# Patient Record
Sex: MALE | Race: WHITE | Employment: OTHER | URBAN - METROPOLITAN AREA
[De-identification: names, ages, dates, MRNs, and addresses within clinical notes are randomized per-mention and may not be internally consistent; named-entity substitution may affect disease eponyms.]

---

## 2021-02-25 ENCOUNTER — TELEPHONE (OUTPATIENT)
Dept: CARDIOLOGY CLINIC | Age: 61
End: 2021-02-25

## 2021-02-25 DIAGNOSIS — I73.9 PAD (PERIPHERAL ARTERY DISEASE) (HCC): Primary | ICD-10-CM

## 2021-02-25 NOTE — TELEPHONE ENCOUNTER
Peripheral angiogram scheduled for 3/8/2021 at 8:00  Arrive at 6:30      The morning of your procedure you will park in the hospital parking lot and report directly to the cath lab to check in.     Pre-Procedure Instructions   1. You will need to fast for at least 8 hours prior to procedure. No caffeine the morning of.   2. Hold your diuretic, lasix the morning of procedure. 3. Hold all diabetic medications including, Metfomin. If you take Lantus/Levemir only take ½ your normal dose the evening before. All other medications can be taken in the morning with sips of water. 4. You will need to take 325 mg aspirin the morning of. If you are currently taking 81 mg please take 4 tablets that morning. 5. Do not use any lotions, creams or perfume the morning of procedure. 6. Pre-procedure lab work will need to be completed 5-7 days prior to procedure. 7. Please have a responsible adult to drive you home after procedure. We advise you have someone to stay with you for 24 hours following procedure for precautionary measures. Depending on procedure you may require an overnight stay. 8. Cath lab will provide you with all post procedure instructions. If you have any questions regarding the procedure itself or medications, please call [phone] and ask to speak with a nurse. Called and spoke to [facility] and she is agreeable to date/time. Went over instructions with Jocelin. Will fax lab orders to [facility].     Published on [employer] and e-mail to Sherry Wallace.

## 2021-03-01 ENCOUNTER — HOSPITAL ENCOUNTER (INPATIENT)
Age: 61
LOS: 10 days | Discharge: NURSING, REHAB OR LONG-TERM CARE | DRG: 474 | End: 2021-03-11
Attending: EMERGENCY MEDICINE
Payer: MEDICARE

## 2021-03-01 ENCOUNTER — APPOINTMENT (OUTPATIENT)
Dept: GENERAL RADIOLOGY | Age: 61
DRG: 474 | End: 2021-03-01
Payer: MEDICARE

## 2021-03-01 DIAGNOSIS — Z86.39 HX OF DIABETIC NEUROPATHY: ICD-10-CM

## 2021-03-01 DIAGNOSIS — T81.31XA DEHISCENCE OF OPERATIVE WOUND, INITIAL ENCOUNTER: Primary | ICD-10-CM

## 2021-03-01 DIAGNOSIS — Z89.432 STATUS POST TRANSMETATARSAL AMPUTATION OF FOOT, LEFT (HCC): ICD-10-CM

## 2021-03-01 DIAGNOSIS — I70.229 CRITICAL LOWER LIMB ISCHEMIA (HCC): ICD-10-CM

## 2021-03-01 PROBLEM — L08.9 WOUND INFECTION: Status: ACTIVE | Noted: 2021-03-01

## 2021-03-01 PROBLEM — T14.8XXA WOUND INFECTION: Status: ACTIVE | Noted: 2021-03-01

## 2021-03-01 LAB
ANION GAP SERPL CALCULATED.3IONS-SCNC: 8 MMOL/L (ref 3–16)
BASOPHILS ABSOLUTE: 0.1 K/UL (ref 0–0.2)
BASOPHILS RELATIVE PERCENT: 0.6 %
BUN BLDV-MCNC: 15 MG/DL (ref 7–20)
C-REACTIVE PROTEIN: 38.6 MG/L (ref 0–5.1)
CALCIUM SERPL-MCNC: 9.1 MG/DL (ref 8.3–10.6)
CHLORIDE BLD-SCNC: 96 MMOL/L (ref 99–110)
CO2: 30 MMOL/L (ref 21–32)
CREAT SERPL-MCNC: 1.1 MG/DL (ref 0.8–1.3)
EOSINOPHILS ABSOLUTE: 0.5 K/UL (ref 0–0.6)
EOSINOPHILS RELATIVE PERCENT: 5.8 %
GFR AFRICAN AMERICAN: >60
GFR NON-AFRICAN AMERICAN: >60
GLUCOSE BLD-MCNC: 236 MG/DL (ref 70–99)
GLUCOSE BLD-MCNC: 307 MG/DL (ref 70–99)
GLUCOSE BLD-MCNC: 358 MG/DL (ref 70–99)
HCT VFR BLD CALC: 38.2 % (ref 40.5–52.5)
HEMOGLOBIN: 12.5 G/DL (ref 13.5–17.5)
LACTIC ACID, SEPSIS: 1.6 MMOL/L (ref 0.4–1.9)
LACTIC ACID, SEPSIS: 1.8 MMOL/L (ref 0.4–1.9)
LYMPHOCYTES ABSOLUTE: 1.5 K/UL (ref 1–5.1)
LYMPHOCYTES RELATIVE PERCENT: 19.1 %
MCH RBC QN AUTO: 27.7 PG (ref 26–34)
MCHC RBC AUTO-ENTMCNC: 32.7 G/DL (ref 31–36)
MCV RBC AUTO: 84.8 FL (ref 80–100)
MONOCYTES ABSOLUTE: 0.7 K/UL (ref 0–1.3)
MONOCYTES RELATIVE PERCENT: 8.3 %
NEUTROPHILS ABSOLUTE: 5.2 K/UL (ref 1.7–7.7)
NEUTROPHILS RELATIVE PERCENT: 66.2 %
PDW BLD-RTO: 14.9 % (ref 12.4–15.4)
PERFORMED ON: ABNORMAL
PERFORMED ON: ABNORMAL
PLATELET # BLD: 261 K/UL (ref 135–450)
PMV BLD AUTO: 7.3 FL (ref 5–10.5)
POTASSIUM REFLEX MAGNESIUM: 4.4 MMOL/L (ref 3.5–5.1)
RBC # BLD: 4.5 M/UL (ref 4.2–5.9)
SEDIMENTATION RATE, ERYTHROCYTE: 99 MM/HR (ref 0–20)
SODIUM BLD-SCNC: 134 MMOL/L (ref 136–145)
WBC # BLD: 7.8 K/UL (ref 4–11)

## 2021-03-01 PROCEDURE — 83605 ASSAY OF LACTIC ACID: CPT

## 2021-03-01 PROCEDURE — 83036 HEMOGLOBIN GLYCOSYLATED A1C: CPT

## 2021-03-01 PROCEDURE — 84134 ASSAY OF PREALBUMIN: CPT

## 2021-03-01 PROCEDURE — 2580000003 HC RX 258: Performed by: INTERNAL MEDICINE

## 2021-03-01 PROCEDURE — 1200000000 HC SEMI PRIVATE

## 2021-03-01 PROCEDURE — 99222 1ST HOSP IP/OBS MODERATE 55: CPT | Performed by: INTERNAL MEDICINE

## 2021-03-01 PROCEDURE — 73620 X-RAY EXAM OF FOOT: CPT

## 2021-03-01 PROCEDURE — 99284 EMERGENCY DEPT VISIT MOD MDM: CPT

## 2021-03-01 PROCEDURE — 85652 RBC SED RATE AUTOMATED: CPT

## 2021-03-01 PROCEDURE — 85025 COMPLETE CBC W/AUTO DIFF WBC: CPT

## 2021-03-01 PROCEDURE — 6370000000 HC RX 637 (ALT 250 FOR IP): Performed by: STUDENT IN AN ORGANIZED HEALTH CARE EDUCATION/TRAINING PROGRAM

## 2021-03-01 PROCEDURE — 80048 BASIC METABOLIC PNL TOTAL CA: CPT

## 2021-03-01 PROCEDURE — 2060000000 HC ICU INTERMEDIATE R&B

## 2021-03-01 PROCEDURE — 86140 C-REACTIVE PROTEIN: CPT

## 2021-03-01 PROCEDURE — 6370000000 HC RX 637 (ALT 250 FOR IP): Performed by: INTERNAL MEDICINE

## 2021-03-01 RX ORDER — ONDANSETRON 2 MG/ML
4 INJECTION INTRAMUSCULAR; INTRAVENOUS EVERY 6 HOURS PRN
Status: DISCONTINUED | OUTPATIENT
Start: 2021-03-01 | End: 2021-03-11 | Stop reason: HOSPADM

## 2021-03-01 RX ORDER — PROMETHAZINE HYDROCHLORIDE 12.5 MG/1
12.5 TABLET ORAL EVERY 6 HOURS PRN
Status: DISCONTINUED | OUTPATIENT
Start: 2021-03-01 | End: 2021-03-11 | Stop reason: HOSPADM

## 2021-03-01 RX ORDER — INSULIN LISPRO 100 [IU]/ML
0-3 INJECTION, SOLUTION INTRAVENOUS; SUBCUTANEOUS NIGHTLY
Status: DISCONTINUED | OUTPATIENT
Start: 2021-03-01 | End: 2021-03-07

## 2021-03-01 RX ORDER — TAMSULOSIN HYDROCHLORIDE 0.4 MG/1
0.8 CAPSULE ORAL DAILY
Status: DISCONTINUED | OUTPATIENT
Start: 2021-03-02 | End: 2021-03-11 | Stop reason: HOSPADM

## 2021-03-01 RX ORDER — ALBUTEROL SULFATE 90 UG/1
2 AEROSOL, METERED RESPIRATORY (INHALATION) EVERY 6 HOURS PRN
COMMUNITY
End: 2021-03-01 | Stop reason: CLARIF

## 2021-03-01 RX ORDER — INSULIN LISPRO 100 [IU]/ML
40 INJECTION, SOLUTION INTRAVENOUS; SUBCUTANEOUS
Status: DISCONTINUED | OUTPATIENT
Start: 2021-03-01 | End: 2021-03-01

## 2021-03-01 RX ORDER — METOPROLOL SUCCINATE 25 MG/1
12.5 TABLET, EXTENDED RELEASE ORAL 2 TIMES DAILY
COMMUNITY

## 2021-03-01 RX ORDER — LOSARTAN POTASSIUM 25 MG/1
25 TABLET ORAL DAILY
COMMUNITY

## 2021-03-01 RX ORDER — MONTELUKAST SODIUM 4 MG/1
1 TABLET, CHEWABLE ORAL 2 TIMES DAILY
COMMUNITY

## 2021-03-01 RX ORDER — INSULIN LISPRO 100 [IU]/ML
40 INJECTION, SOLUTION INTRAVENOUS; SUBCUTANEOUS ONCE
Status: COMPLETED | OUTPATIENT
Start: 2021-03-01 | End: 2021-03-01

## 2021-03-01 RX ORDER — CILOSTAZOL 50 MG/1
100 TABLET ORAL 2 TIMES DAILY
Status: DISCONTINUED | OUTPATIENT
Start: 2021-03-01 | End: 2021-03-05

## 2021-03-01 RX ORDER — DULOXETIN HYDROCHLORIDE 30 MG/1
90 CAPSULE, DELAYED RELEASE ORAL DAILY
COMMUNITY

## 2021-03-01 RX ORDER — ASPIRIN 81 MG/1
81 TABLET ORAL DAILY
Status: DISCONTINUED | OUTPATIENT
Start: 2021-03-02 | End: 2021-03-11 | Stop reason: HOSPADM

## 2021-03-01 RX ORDER — NAPROXEN 500 MG/1
500 TABLET ORAL 2 TIMES DAILY WITH MEALS
Status: ON HOLD | COMMUNITY
End: 2021-03-08 | Stop reason: HOSPADM

## 2021-03-01 RX ORDER — PANTOPRAZOLE SODIUM 40 MG/1
40 TABLET, DELAYED RELEASE ORAL 2 TIMES DAILY
COMMUNITY

## 2021-03-01 RX ORDER — INSULIN LISPRO 100 [IU]/ML
0-6 INJECTION, SOLUTION INTRAVENOUS; SUBCUTANEOUS
Status: DISCONTINUED | OUTPATIENT
Start: 2021-03-02 | End: 2021-03-07

## 2021-03-01 RX ORDER — INSULIN GLARGINE 100 [IU]/ML
70 INJECTION, SOLUTION SUBCUTANEOUS 2 TIMES DAILY
COMMUNITY

## 2021-03-01 RX ORDER — SODIUM CHLORIDE 9 MG/ML
INJECTION, SOLUTION INTRAVENOUS CONTINUOUS
Status: DISCONTINUED | OUTPATIENT
Start: 2021-03-01 | End: 2021-03-02 | Stop reason: SDUPTHER

## 2021-03-01 RX ORDER — PRAMIPEXOLE DIHYDROCHLORIDE 0.25 MG/1
0.5 TABLET ORAL NIGHTLY
Status: DISCONTINUED | OUTPATIENT
Start: 2021-03-01 | End: 2021-03-11 | Stop reason: HOSPADM

## 2021-03-01 RX ORDER — SIMVASTATIN 40 MG
40 TABLET ORAL NIGHTLY
COMMUNITY

## 2021-03-01 RX ORDER — FUROSEMIDE 40 MG/1
40 TABLET ORAL 2 TIMES DAILY
COMMUNITY

## 2021-03-01 RX ORDER — SODIUM CHLORIDE 9 MG/ML
INJECTION, SOLUTION INTRAVENOUS CONTINUOUS
Status: DISCONTINUED | OUTPATIENT
Start: 2021-03-02 | End: 2021-03-01 | Stop reason: SDUPTHER

## 2021-03-01 RX ORDER — PRAMIPEXOLE DIHYDROCHLORIDE 0.5 MG/1
0.5 TABLET ORAL NIGHTLY
COMMUNITY

## 2021-03-01 RX ORDER — GABAPENTIN 300 MG/1
300 CAPSULE ORAL 3 TIMES DAILY
Status: DISCONTINUED | OUTPATIENT
Start: 2021-03-01 | End: 2021-03-11 | Stop reason: HOSPADM

## 2021-03-01 RX ORDER — POLYETHYLENE GLYCOL 3350 17 G/17G
17 POWDER, FOR SOLUTION ORAL DAILY PRN
Status: DISCONTINUED | OUTPATIENT
Start: 2021-03-01 | End: 2021-03-11 | Stop reason: HOSPADM

## 2021-03-01 RX ORDER — SODIUM CHLORIDE 0.9 % (FLUSH) 0.9 %
10 SYRINGE (ML) INJECTION PRN
Status: DISCONTINUED | OUTPATIENT
Start: 2021-03-01 | End: 2021-03-02 | Stop reason: SDUPTHER

## 2021-03-01 RX ORDER — GABAPENTIN 300 MG/1
300 CAPSULE ORAL 3 TIMES DAILY
COMMUNITY

## 2021-03-01 RX ORDER — PANTOPRAZOLE SODIUM 40 MG/1
40 TABLET, DELAYED RELEASE ORAL 2 TIMES DAILY
Status: DISCONTINUED | OUTPATIENT
Start: 2021-03-01 | End: 2021-03-11 | Stop reason: HOSPADM

## 2021-03-01 RX ORDER — SODIUM CHLORIDE 0.9 % (FLUSH) 0.9 %
10 SYRINGE (ML) INJECTION EVERY 12 HOURS SCHEDULED
Status: DISCONTINUED | OUTPATIENT
Start: 2021-03-01 | End: 2021-03-01 | Stop reason: SDUPTHER

## 2021-03-01 RX ORDER — ACETAMINOPHEN 650 MG/1
650 SUPPOSITORY RECTAL EVERY 6 HOURS PRN
Status: DISCONTINUED | OUTPATIENT
Start: 2021-03-01 | End: 2021-03-11 | Stop reason: HOSPADM

## 2021-03-01 RX ORDER — CHOLESTYRAMINE LIGHT 4 G/5.7G
4 POWDER, FOR SUSPENSION ORAL DAILY
Status: DISCONTINUED | OUTPATIENT
Start: 2021-03-02 | End: 2021-03-11 | Stop reason: HOSPADM

## 2021-03-01 RX ORDER — CILOSTAZOL 100 MG/1
100 TABLET ORAL 2 TIMES DAILY
COMMUNITY

## 2021-03-01 RX ORDER — LANOLIN ALCOHOL/MO/W.PET/CERES
3 CREAM (GRAM) TOPICAL DAILY
COMMUNITY

## 2021-03-01 RX ORDER — METOPROLOL SUCCINATE 25 MG/1
12.5 TABLET, EXTENDED RELEASE ORAL 2 TIMES DAILY
Status: DISCONTINUED | OUTPATIENT
Start: 2021-03-01 | End: 2021-03-11 | Stop reason: HOSPADM

## 2021-03-01 RX ORDER — ACETAMINOPHEN 325 MG/1
650 TABLET ORAL EVERY 6 HOURS PRN
Status: DISCONTINUED | OUTPATIENT
Start: 2021-03-01 | End: 2021-03-11 | Stop reason: HOSPADM

## 2021-03-01 RX ORDER — SODIUM CHLORIDE 0.9 % (FLUSH) 0.9 %
10 SYRINGE (ML) INJECTION EVERY 12 HOURS SCHEDULED
Status: DISCONTINUED | OUTPATIENT
Start: 2021-03-01 | End: 2021-03-11 | Stop reason: HOSPADM

## 2021-03-01 RX ORDER — TAMSULOSIN HYDROCHLORIDE 0.4 MG/1
0.8 CAPSULE ORAL DAILY
COMMUNITY

## 2021-03-01 RX ORDER — LOSARTAN POTASSIUM 25 MG/1
25 TABLET ORAL DAILY
Status: DISCONTINUED | OUTPATIENT
Start: 2021-03-02 | End: 2021-03-11 | Stop reason: HOSPADM

## 2021-03-01 RX ORDER — ASPIRIN 81 MG/1
81 TABLET ORAL DAILY
COMMUNITY

## 2021-03-01 RX ORDER — SODIUM CHLORIDE 0.9 % (FLUSH) 0.9 %
10 SYRINGE (ML) INJECTION PRN
Status: DISCONTINUED | OUTPATIENT
Start: 2021-03-01 | End: 2021-03-01 | Stop reason: SDUPTHER

## 2021-03-01 RX ORDER — POTASSIUM CHLORIDE 750 MG/1
10 CAPSULE, EXTENDED RELEASE ORAL DAILY
COMMUNITY

## 2021-03-01 RX ORDER — ATORVASTATIN CALCIUM 20 MG/1
20 TABLET, FILM COATED ORAL NIGHTLY
Status: DISCONTINUED | OUTPATIENT
Start: 2021-03-01 | End: 2021-03-11 | Stop reason: HOSPADM

## 2021-03-01 RX ORDER — INSULIN LISPRO 100 [IU]/ML
40 INJECTION, SOLUTION INTRAVENOUS; SUBCUTANEOUS
Status: DISCONTINUED | OUTPATIENT
Start: 2021-03-02 | End: 2021-03-06

## 2021-03-01 RX ADMIN — ATORVASTATIN CALCIUM 20 MG: 20 TABLET, FILM COATED ORAL at 21:34

## 2021-03-01 RX ADMIN — PRAMIPEXOLE DIHYDROCHLORIDE 0.5 MG: 0.25 TABLET ORAL at 21:34

## 2021-03-01 RX ADMIN — PANTOPRAZOLE SODIUM 40 MG: 40 TABLET, DELAYED RELEASE ORAL at 21:33

## 2021-03-01 RX ADMIN — INSULIN GLARGINE 70 UNITS: 100 INJECTION, SOLUTION SUBCUTANEOUS at 23:14

## 2021-03-01 RX ADMIN — INSULIN LISPRO 40 UNITS: 100 INJECTION, SOLUTION INTRAVENOUS; SUBCUTANEOUS at 20:56

## 2021-03-01 RX ADMIN — GABAPENTIN 300 MG: 300 CAPSULE ORAL at 21:34

## 2021-03-01 RX ADMIN — Medication 10 ML: at 21:33

## 2021-03-01 RX ADMIN — INSULIN LISPRO 1 UNITS: 100 INJECTION, SOLUTION INTRAVENOUS; SUBCUTANEOUS at 23:13

## 2021-03-01 RX ADMIN — METOPROLOL SUCCINATE 12.5 MG: 25 TABLET, EXTENDED RELEASE ORAL at 21:34

## 2021-03-01 RX ADMIN — SODIUM CHLORIDE: 9 INJECTION, SOLUTION INTRAVENOUS at 21:35

## 2021-03-01 RX ADMIN — CILOSTAZOL 100 MG: 50 TABLET ORAL at 21:34

## 2021-03-01 ASSESSMENT — ENCOUNTER SYMPTOMS
EYE PAIN: 0
ABDOMINAL DISTENTION: 0
SHORTNESS OF BREATH: 0
COUGH: 0
ABDOMINAL PAIN: 0
APNEA: 0

## 2021-03-01 NOTE — CONSULTS
Department of Podiatry Consult Note  Resident       Reason for Consult:  Recent amputation of toes, concern for wound dehiscence  Requesting Physician:  Damaris Conway MD    CHIEF COMPLAINT:  Wound dehiscence    HISTORY OF PRESENT ILLNESS:                The patient is a 63 y.o. male with significant past medical history of type 1 diabetes mellitus, history of below-knee amputation of right lower extremity, transmetatarsal amputation of left lower extremity, peripheral arterial disease. Podiatry was consulted for concern of wound dehiscence of left transmetatarsal amputation. Approximately 5 weeks ago he underwent transmetatarsal amputation by Dr. Jesse Zimmer at [facility]. Sutures were removed approximately 3 weeks ago, and since the wound has dehisced. Patient recently started seeing Dr. Phu Beltran who had concerns of left lower extremity chronic limb ischemia. Patient was originally scheduled for a diagnostic angiogram on 3/8, however due to worsening necrosis of his amputation stump, was advised to present to emergency department to be admitted for more urgent intervention. Patient states his dressing to his left lower extremities been changed with Betadine soaked gauze, dry sterile dressing. He denies any purulent drainage or malodor from the wound. Patient denies any pain to the left lower extremity, denies constitutional symptoms including but not limited to nausea, vomiting, fever, chills, shortness breath, chest pain. Past Medical History:    History reviewed. No pertinent past medical history. Past Surgical History:    History reviewed. No pertinent surgical history. Current Medications:    Current Facility-Administered Medications: insulin lispro (1 Unit Dial) 40 Units, 40 Units, Subcutaneous, Once  Allergies:   Patient has no known allergies. Social History:    TOBACCO:   reports that he has never smoked.  He has never used smokeless tobacco.  ETOH:   reports previous alcohol use.  DRUGS:   reports no history of drug use. Family History:   History reviewed. No pertinent family history. REVIEW OF SYSTEMS:    Review of Systems: A 12 point review of systems is unremarkable with the exception of the chief complaint. Patient specifically denies nausea, vomiting, fever, chills, shortness of breath, chest pain, abdominal pain, constipation, or difficulty urinating. PHYSICAL EXAM:      Vitals:    BP (!) 150/58   Pulse 94   Temp 97.5 °F (36.4 °C) (Oral)   Resp 20   SpO2 94%     LABS:   Recent Labs     03/01/21  1655   WBC 7.8   HGB 12.5*   HCT 38.2*        Recent Labs     03/01/21  1655   *   K 4.4   CL 96*   CO2 30   BUN 15   CREATININE 1.1     No results for input(s): PROT, INR, APTT in the last 72 hours. Focused left lower extremity exam:    VASCULAR: DP and PT pulses nonpalpable 0/4. Upon hand-held Doppler examination, DP pulse noted to be monophasic, PT pulse noted to be faintly monophasic. CFT is less than 3 seconds to the distal stump. Skin temperature is warm to cool from proximal to distal with no focal calor noted. No edema noted. No pain with calf compression. NEUROLOGIC: Gross and epicritic sensation is diminished. Protective sensation is diminished at all pedal sites. DERMATOLOGIC: Dehiscence of transmetatarsal amputation incision noted with necrotic tissue to the distal stump plantarly. Wound base is accommodation of granular and fibrotic tissue. Metatarsals 1, 2, 3 are visibly exposed. There is no purulent drainage expressed in the wound. No fluctuance or crepitus noted. Wound is not tunnel, track. There is no surrounding erythema. Dermatological changes noted to the left lower extremity consistent with venous stasis dermatitis. Diffuse xerosis noted. No other open wounds noted. Images from 3/1. MUSCULOSKELETAL: Muscle strength is 5/5 for all pedal groups tested. No pain with palpation of the foot or ankle.   History of BKA to RLE, TMA LLE. IMAGING:  Narrative   XR FOOT LEFT (2 VIEWS)       Indication: assess for underlying gas formation at recent amputation site        COMPARISON: None       Findings: AP and lateral views of the left foot were obtained. Transmetatarsal amputation of all digits is noted. There is overlying packing material at the surgical site.       Vascular calcification is noted. There is no comparison study to immediate postoperative radiographs or presurgical appearance of the foot. No discrete soft tissue gas.       If concerned for ongoing infection, MRI without and with contrast is recommend along with clinical correlation with lab parameters.            Impression   Impression: Limited study, as above. No comparisons are available. IMPRESSION/RECOMMENDATIONS:    1. Dehiscence of wound; LLE  2. Peripheral arterial disease  3. History of BKA; RLE  4. History of TMA; LLE  5. Type 1 DM    -Patient examined and evaluated at bedside   -Hypertensive, tachypneic, otherwise VSS, no leukocytosis noted (WBC 7.8)  -CRP 30.6, ESR 99  -Hemoglobin A1c, prealbumin ordered  -Imaging reviewed, impression noted above  -No wound culture obtained 2/2 no drainage noted.  -Wound dressing applied to left foot consisting of Betadine soaked gauze, dry still dressing, tetranet  -No acute signs of infection from podiatric standpoint  -Nonweightbearing to left lower extremity  -Recommend admission for further evaluation of peripheral arterial disease and possible debridement of wound.  -Diagnostic angiogram of left lower extremity tomorrow AM   -We will closely monitor, will discuss with Dr. Yue Hansen following angiogram tomorrow to determine healing potential.      Dispo: Dehiscence of TMA wound with peripheral arterial disease of left lower extremity. Will undergo diagnostic angiogram of left lower extremity tomorrow AM.  No acute signs of infection from podiatric standpoint.   We will closely monitor following angiogram to determine healing potential.    - The patient will be staffed with Dr. Daniele Angel, CLARITZA Patton DPM  Podiatric Resident, PGY-1  Pager #: [phone] or Johana Bauer

## 2021-03-01 NOTE — CONSULTS
[address]  1960    March 1, 2021      Reason for Referral: CLI    CC: left foot ulcer      Subjective:     History of Present Illness:    Barbara Herrera is a 63 y.o. patient with a PMH significant for CAD, CABG, PAD presented with complaints of left foot ulcer. Patient has open wound left foot. Past Medical History:   has no past medical history on file. Surgical History:   has no past surgical history on file. Social History:   reports that he has never smoked. He has never used smokeless tobacco. He reports previous alcohol use. He reports that he does not use drugs. Family History:  family history is not on file. Home Medications:  Were reviewed and are listed in nursing record and/or below  Prior to Admission medications    Not on File        CURRENT Medications:      insulin lispro (1 Unit Dial) 40 Units, Once        Allergies:  Patient has no known allergies. Review of Systems: SEE HPI   · Constitutional: no unanticipated weight loss. There's been no change in energy level, sleep pattern, or activity level. No fevers, chills. · Eyes: No visual changes or diplopia. No scleral icterus. · ENT: No Headaches, hearing loss or vertigo. No mouth sores or sore throat. · Cardiovascular: No Chest pain, tightness or discomfort.  No Shortness of breath. No Dyspnea on exertion, Orthopnea, Paroxysmal nocturnal dyspnea or breathlessness at rest.   No Palpitations.  No Syncope ('blackouts', 'faints', 'collapse') or dizziness. · Respiratory: No cough or wheezing, no sputum production. No hematemesis. · Gastrointestinal: No abdominal pain, appetite loss, blood in stools. No change in bowel or bladder habits. · Genitourinary: No dysuria, trouble voiding, or hematuria. · Musculoskeletal:  No gait disturbance, no joint complaints. · Integumentary: No rash or pruritis.   · Neurological: No headache, diplopia, change in muscle strength, numbness or tingling. · Psychiatric: No anxiety or depression. · Endocrine: No temperature intolerance. No excessive thirst, fluid intake, or urination. No tremor. · Hematologic/Lymphatic: No abnormal bruising or bleeding, blood clots or swollen lymph nodes. · Allergic/Immunologic: No nasal congestion or hives. Objective:     PHYSICAL EXAM:      Vitals:    03/01/21 1530   BP: (!) 150/58   Pulse:    Resp:    Temp:    SpO2: 94%            General Appearance:  Alert, cooperative, no distress, appears stated age. Head:  Normocephalic, without obvious abnormality, atraumatic. Eyes:  Pupils equal and round. No scleral icterus. Mouth: Moist mucosa, no pharyngeal erythema. Nose: Nares normal. No drainage or sinus tenderness. Neck: Supple, symmetrical, trachea midline. No adenopathy. No tenderness/mass/nodules. No carotid bruit or elevated JVD. Lungs:   Respiratory Effort: Normal   Auscultation: Clear to auscultation bilaterally, respirations unlabored. No wheeze, rales   Chest Wall:  No tenderness or deformity. Cardiovascular:    Pulses  Palpation: normal   Ascultation: Regular rate, S1/ S2 normal. No murmur, rub, or gallop. Left foot in dressing, right BKA   Abdomen and Gastrointestinal:   Soft, non-tender, bowel sounds active. Liver and Spleen  Masses   Musculoskeletal: No muscle wasting  Back  Gait   Extremities: Left foot in dressing, right BKA       Skin: Inspection and palpation performed, no rashes or lesions. Pysch: Normal mood and affect.  Alert and oriented to time place person   Neurologic: Normal gross motor and sensory exam.       Labs     All labs have been reviewed    Lab Results   Component Value Date    WBC 7.8 03/01/2021    RBC 4.50 03/01/2021    HGB 12.5 03/01/2021    HCT 38.2 03/01/2021    MCV 84.8 03/01/2021    RDW 14.9 03/01/2021     03/01/2021     Lab Results   Component Value Date     03/01/2021    K 4.4 03/01/2021    CL 96 03/01/2021    CO2 30 03/01/2021    BUN 15 03/01/2021    CREATININE 1.1 03/01/2021    GFRAA >60 03/01/2021    LABGLOM >60 03/01/2021    GLUCOSE 307 03/01/2021    CALCIUM 9.1 03/01/2021     No results found for: PTINR  No results found for: LABA1C  No results found for: CKTOTAL, CKMB, CKMBINDEX, TROPONINI    Cardiac, Vascular and Imaging Data all Personally Reviewed in Detail by Myself      All imaging labs reviewed. Assessment and Plan     -Critical Limb Ischemia, PAD  Plan diagnostic angiogram of left lower extremity angiogram tomorrow. I had a detail discussion with the patient and the family members regarding the risk and benefit of the procedures. I explained to them the details of the procedure. I explained to them that the procedure will be performed using moderate sedation and local anaesthesia using lidocaine. I explained any risk of bleeding, perforation of the vessel, stroke, myocardial infarction or death. The patient and the family members understood the risk and benefits and the details of procedure and would like to go ahead with the procedure. The patient gave informed consent. Thank you for allowing us to participate in the care of Nory Terrell. Please do not hesitate to contact me if you have any questions.     Briana Parrish MD, MPH    [facility], [facility] [address]  Ph: [phone]  Fax: [phone]    3/1/2021 6:18 PM

## 2021-03-01 NOTE — ED NOTES
Bed: A04-04  Expected date: 3/1/21  Expected time:   Means of arrival:   Comments:  [address] Checo Rice, RN  03/01/21 7109

## 2021-03-01 NOTE — PROGRESS NOTES
Critical limb ischemia  RC6   Previously scheduled for angiogram Monday 3/8 @ [facility]   NPO MN   Peripheral angiogram tomorrow @ 7:30 AM   Discussed with Dr. Anatoliy Kee     Full consult to follow     Alice Velasco MD [facility], Interventional Cardiology, and Peripheral Vascular [address] [facility]   (C): [phone]  (O): [phone]

## 2021-03-01 NOTE — ED PROVIDER NOTES
[facility] RESIDENT NOTE       Date of evaluation: 3/1/2021    Chief Complaint     Post-op Problem (partial foot amputation last Friday. Bone is exposed and wound is open)      History of Present Illness     Benson Ann is a 63 y.o. male with a PMH of who presents nondependent type 1 diabetes, right-sided lower extremity amputation, recent left-sided toe amputations, presents to the ED with concerns of postoperative complications. According to the patient, proximately 5 weeks ago he underwent amputation of all 5 digits on his left foot due to infection secondary to poorly controlled diabetes. Sutures were removed approximately 3 weeks ago. Since then, he has noticed wound dehiscence for the past 2 weeks. Occasional drainage is also noticed from the wound. Patient has pretty significant peripheral neuropathy and denies any pain sensation. He denies any surrounding redness or ascending redness of the foot. He denies any fever or chills. Review of Systems     Review of Systems   Constitutional: Negative for chills and fever. HENT: Negative for congestion and ear pain. Eyes: Negative for pain. Respiratory: Negative for apnea, cough and shortness of breath. Cardiovascular: Negative for chest pain and palpitations. Gastrointestinal: Negative for abdominal distention and abdominal pain. Endocrine: Negative for polydipsia and polyuria. Neurological: Negative for dizziness. Past Medical, Surgical, Family, and Social History     He has no past medical history on file. He has no past surgical history on file. His family history is not on file. He reports that he has never smoked. He has never used smokeless tobacco. He reports previous alcohol use. He reports that he does not use drugs. Medications     Previous Medications    No medications on file       Allergies     He has No Known Allergies.     Physical Exam     INITIAL VITALS: BP: (!) 158/61, Temp: 97.5 °F (36.4 °C), Pulse: 94, Resp: 20, SpO2: 100 %   Physical Exam  Cardiovascular:      Rate and Rhythm: Normal rate. Pulses: Normal pulses. Heart sounds: No murmur. Pulmonary:      Effort: Pulmonary effort is normal. No respiratory distress. Abdominal:      General: There is no distension. Palpations: Abdomen is soft. Tenderness: There is no abdominal tenderness. Musculoskeletal:      Comments: Left Foot: Wound dehiscence noted at left toe amputation site with bone visualized. Necrotic skin at the base of the foot. Underlying erythema. Reduced sensation. Right leg: Right-sided BKA. Skin:     General: Skin is warm. Capillary Refill: Capillary refill takes less than 2 seconds. Neurological:      Mental Status: He is alert and oriented to person, place, and time. DiagnosticResults       RADIOLOGY:  XR FOOT LEFT (2 VIEWS)   Final Result   Impression: Limited study, as above. No comparisons are available. LABS:   Results for orders placed or performed during the hospital encounter of 03/01/21   CBC Auto Differential   Result Value Ref Range    WBC 7.8 4.0 - 11.0 K/uL    RBC 4.50 4. 20 - 5.90 M/uL    Hemoglobin 12.5 (L) 13.5 - 17.5 g/dL    Hematocrit 38.2 (L) 40.5 - 52.5 %    MCV 84.8 80.0 - 100.0 fL    MCH 27.7 26.0 - 34.0 pg    MCHC 32.7 31.0 - 36.0 g/dL    RDW 14.9 12.4 - 15.4 %    Platelets 710 850 - 642 K/uL    MPV 7.3 5.0 - 10.5 fL    Neutrophils % 66.2 %    Lymphocytes % 19.1 %    Monocytes % 8.3 %    Eosinophils % 5.8 %    Basophils % 0.6 %    Neutrophils Absolute 5.2 1.7 - 7.7 K/uL    Lymphocytes Absolute 1.5 1.0 - 5.1 K/uL    Monocytes Absolute 0.7 0.0 - 1.3 K/uL    Eosinophils Absolute 0.5 0.0 - 0.6 K/uL    Basophils Absolute 0.1 0.0 - 0.2 K/uL   Basic Metabolic Panel w/ Reflex to MG   Result Value Ref Range    Sodium 134 (L) 136 - 145 mmol/L    Potassium reflex Magnesium 4.4 3.5 - 5.1 mmol/L    Chloride 96 (L) 99 - 110 mmol/L CO2 30 21 - 32 mmol/L    Anion Gap 8 3 - 16    Glucose 307 (H) 70 - 99 mg/dL    BUN 15 7 - 20 mg/dL    CREATININE 1.1 0.8 - 1.3 mg/dL    GFR Non-African American >60 >60    GFR African American >60 >60    Calcium 9.1 8.3 - 10.6 mg/dL   Lactate, Sepsis   Result Value Ref Range    Lactic Acid, Sepsis 1.8 0.4 - 1.9 mmol/L   CRP   Result Value Ref Range    CRP 38.6 (H) 0.0 - 5.1 mg/L       ED BEDSIDE ULTRASOUND:  None    RECENT VITALS:  BP: (!) 150/58, Temp: 97.5 °F (36.4 °C), Pulse: 94,Resp: 20, SpO2: 94 %     Procedures     None    ED Course     Nursing Notes, Past Medical Hx, Past Surgical Hx, Social Hx, Allergies, and Family Hx were reviewed. The patient was given the followingmedications:  Orders Placed This Encounter   Medications    insulin lispro (1 Unit Dial) 40 Units       CONSULTS:  IP CONSULT TO PODIATRY  IP CONSULT TO HOSPITALIST    MEDICAL DECISION MAKING / ASSESSMENT / Emerita Andres is a 63 y.o. male with a past medical history of insulin-dependent type 1 diabetes and previous i lower extremity amputations, who presents to the ED with concerns of wound dehiscence in the setting of a recent toe amputation of the left foot. Given that the exam revealed significant necrosis of surrounding tissue, as well as erythema, as well as notable visualized bone and dehiscence of the wound. Due to lack of significant discharge from the wound or a sending erythema, there is low concern for secondary infectious process at this time. As result, inpatient podiatry team was consulted due to likely need for surgical intervention at this time. In the meantime basic laboratory work including inflammatory markers was ordered. X-ray imaging was also obtained to assess for underlying gaseous formation concerning for possible necrotizing fasciitis. CBC revealed leukocytosis and H&H near baseline. Metabolic panel did not show any electrolyte abnormalities and serum creatinine was near baseline.   CRP and ESR was

## 2021-03-01 NOTE — ED PROVIDER NOTES
ED Attending Attestation Note     Date of evaluation: 3/1/2021    This patient was seen by the resident. I have seen and examined the patient, agree with the workup, evaluation, management and diagnosis. The care plan has been discussed. I was present for any procedures performed in the resident's  note and have made edits to the note where appropriate. My assessment reveals 63 y.o. male with history of type 1 diabetes, right BKA, left transmetatarsal amputation recently performed by podiatry presenting for wound issues. Has significant dehiscence of the left foot wound with exposed metatarsal.  No drainage, erythema, warmth, or other signs of active infection at this time. Discussed with podiatry who will revise it in the near future. Will admit to the hospitalist team for further care and management in the meantime.           Austin Meckel, MD  03/01/21 4164

## 2021-03-02 ENCOUNTER — HOSPITAL ENCOUNTER (OUTPATIENT)
Dept: INTERVENTIONAL RADIOLOGY/VASCULAR | Age: 61
Discharge: HOME | DRG: 474 | End: 2021-03-02
Payer: MEDICARE

## 2021-03-02 PROBLEM — S91.302A OPEN WOUND OF LEFT FOOT: Status: ACTIVE | Noted: 2021-03-02

## 2021-03-02 PROBLEM — T81.31XA WOUND DEHISCENCE, SURGICAL: Status: ACTIVE | Noted: 2021-03-02

## 2021-03-02 LAB
ANION GAP SERPL CALCULATED.3IONS-SCNC: 12 MMOL/L (ref 3–16)
BUN BLDV-MCNC: 19 MG/DL (ref 7–20)
CALCIUM SERPL-MCNC: 8.8 MG/DL (ref 8.3–10.6)
CHLORIDE BLD-SCNC: 99 MMOL/L (ref 99–110)
CO2: 27 MMOL/L (ref 21–32)
CREAT SERPL-MCNC: 1.1 MG/DL (ref 0.8–1.3)
ESTIMATED AVERAGE GLUCOSE: 214.5 MG/DL
GFR AFRICAN AMERICAN: >60
GFR NON-AFRICAN AMERICAN: >60
GLUCOSE BLD-MCNC: 192 MG/DL (ref 70–99)
GLUCOSE BLD-MCNC: 213 MG/DL (ref 70–99)
GLUCOSE BLD-MCNC: 214 MG/DL (ref 70–99)
GLUCOSE BLD-MCNC: 246 MG/DL (ref 70–99)
GLUCOSE BLD-MCNC: 302 MG/DL (ref 70–99)
HBA1C MFR BLD: 9.1 %
HCT VFR BLD CALC: 37.1 % (ref 40.5–52.5)
HEMOGLOBIN: 12.1 G/DL (ref 13.5–17.5)
MCH RBC QN AUTO: 27.8 PG (ref 26–34)
MCHC RBC AUTO-ENTMCNC: 32.6 G/DL (ref 31–36)
MCV RBC AUTO: 85.4 FL (ref 80–100)
PDW BLD-RTO: 15.2 % (ref 12.4–15.4)
PERFORMED ON: ABNORMAL
PLATELET # BLD: 248 K/UL (ref 135–450)
PMV BLD AUTO: 7.1 FL (ref 5–10.5)
POTASSIUM REFLEX MAGNESIUM: 3.9 MMOL/L (ref 3.5–5.1)
PREALBUMIN: 16.3 MG/DL (ref 20–40)
RBC # BLD: 4.34 M/UL (ref 4.2–5.9)
SODIUM BLD-SCNC: 138 MMOL/L (ref 136–145)
WBC # BLD: 7.4 K/UL (ref 4–11)

## 2021-03-02 PROCEDURE — 99153 MOD SED SAME PHYS/QHP EA: CPT

## 2021-03-02 PROCEDURE — 85027 COMPLETE CBC AUTOMATED: CPT

## 2021-03-02 PROCEDURE — 36247 INS CATH ABD/L-EXT ART 3RD: CPT

## 2021-03-02 PROCEDURE — 2709999900 HC NON-CHARGEABLE SUPPLY

## 2021-03-02 PROCEDURE — 2580000003 HC RX 258: Performed by: INTERNAL MEDICINE

## 2021-03-02 PROCEDURE — 80048 BASIC METABOLIC PNL TOTAL CA: CPT

## 2021-03-02 PROCEDURE — 36247 INS CATH ABD/L-EXT ART 3RD: CPT | Performed by: INTERNAL MEDICINE

## 2021-03-02 PROCEDURE — C1769 GUIDE WIRE: HCPCS

## 2021-03-02 PROCEDURE — 2060000000 HC ICU INTERMEDIATE R&B

## 2021-03-02 PROCEDURE — 2500000003 HC RX 250 WO HCPCS

## 2021-03-02 PROCEDURE — 2580000003 HC RX 258: Performed by: PODIATRIST

## 2021-03-02 PROCEDURE — 99152 MOD SED SAME PHYS/QHP 5/>YRS: CPT

## 2021-03-02 PROCEDURE — 75710 ARTERY X-RAYS ARM/LEG: CPT | Performed by: INTERNAL MEDICINE

## 2021-03-02 PROCEDURE — 99223 1ST HOSP IP/OBS HIGH 75: CPT | Performed by: INTERNAL MEDICINE

## 2021-03-02 PROCEDURE — 6370000000 HC RX 637 (ALT 250 FOR IP): Performed by: INTERNAL MEDICINE

## 2021-03-02 PROCEDURE — 6360000002 HC RX W HCPCS: Performed by: PODIATRIST

## 2021-03-02 PROCEDURE — 75774 ARTERY X-RAY EACH VESSEL: CPT

## 2021-03-02 PROCEDURE — 36415 COLL VENOUS BLD VENIPUNCTURE: CPT

## 2021-03-02 PROCEDURE — 75625 CONTRAST EXAM ABDOMINL AORTA: CPT | Performed by: INTERNAL MEDICINE

## 2021-03-02 PROCEDURE — 6360000004 HC RX CONTRAST MEDICATION: Performed by: INTERNAL MEDICINE

## 2021-03-02 PROCEDURE — C1894 INTRO/SHEATH, NON-LASER: HCPCS

## 2021-03-02 PROCEDURE — 1200000000 HC SEMI PRIVATE

## 2021-03-02 PROCEDURE — C1725 CATH, TRANSLUMIN NON-LASER: HCPCS

## 2021-03-02 PROCEDURE — 75710 ARTERY X-RAYS ARM/LEG: CPT

## 2021-03-02 PROCEDURE — 6360000002 HC RX W HCPCS: Performed by: INTERNAL MEDICINE

## 2021-03-02 RX ORDER — SODIUM CHLORIDE 0.9 % (FLUSH) 0.9 %
10 SYRINGE (ML) INJECTION EVERY 12 HOURS SCHEDULED
Status: DISCONTINUED | OUTPATIENT
Start: 2021-03-02 | End: 2021-03-11 | Stop reason: HOSPADM

## 2021-03-02 RX ORDER — SODIUM CHLORIDE 0.9 % (FLUSH) 0.9 %
10 SYRINGE (ML) INJECTION PRN
Status: DISCONTINUED | OUTPATIENT
Start: 2021-03-02 | End: 2021-03-11 | Stop reason: HOSPADM

## 2021-03-02 RX ORDER — ACETAMINOPHEN 325 MG/1
650 TABLET ORAL EVERY 4 HOURS PRN
Status: DISCONTINUED | OUTPATIENT
Start: 2021-03-02 | End: 2021-03-02 | Stop reason: SDUPTHER

## 2021-03-02 RX ORDER — ACETAMINOPHEN 325 MG/1
650 TABLET ORAL EVERY 4 HOURS PRN
Status: CANCELLED | OUTPATIENT
Start: 2021-03-02

## 2021-03-02 RX ORDER — MECOBALAMIN 5000 MCG
5 TABLET,DISINTEGRATING ORAL NIGHTLY
Status: DISCONTINUED | OUTPATIENT
Start: 2021-03-02 | End: 2021-03-11 | Stop reason: HOSPADM

## 2021-03-02 RX ORDER — SODIUM CHLORIDE 9 MG/ML
INJECTION, SOLUTION INTRAVENOUS CONTINUOUS
Status: DISCONTINUED | OUTPATIENT
Start: 2021-03-02 | End: 2021-03-04

## 2021-03-02 RX ORDER — SODIUM CHLORIDE 0.9 % (FLUSH) 0.9 %
10 SYRINGE (ML) INJECTION PRN
Status: CANCELLED | OUTPATIENT
Start: 2021-03-02

## 2021-03-02 RX ORDER — ONDANSETRON 2 MG/ML
4 INJECTION INTRAMUSCULAR; INTRAVENOUS EVERY 6 HOURS PRN
Status: DISCONTINUED | OUTPATIENT
Start: 2021-03-02 | End: 2021-03-02 | Stop reason: SDUPTHER

## 2021-03-02 RX ORDER — ONDANSETRON 2 MG/ML
4 INJECTION INTRAMUSCULAR; INTRAVENOUS EVERY 6 HOURS PRN
Status: CANCELLED | OUTPATIENT
Start: 2021-03-02

## 2021-03-02 RX ORDER — SODIUM CHLORIDE 0.9 % (FLUSH) 0.9 %
10 SYRINGE (ML) INJECTION EVERY 12 HOURS SCHEDULED
Status: CANCELLED | OUTPATIENT
Start: 2021-03-02

## 2021-03-02 RX ORDER — IODIXANOL 270 MG/ML
250 INJECTION, SOLUTION INTRAVASCULAR
Status: COMPLETED | OUTPATIENT
Start: 2021-03-02 | End: 2021-03-02

## 2021-03-02 RX ORDER — SODIUM CHLORIDE 9 MG/ML
INJECTION, SOLUTION INTRAVENOUS CONTINUOUS
Status: CANCELLED | OUTPATIENT
Start: 2021-03-02

## 2021-03-02 RX ADMIN — INSULIN LISPRO 2 UNITS: 100 INJECTION, SOLUTION INTRAVENOUS; SUBCUTANEOUS at 12:23

## 2021-03-02 RX ADMIN — Medication 10 ML: at 20:14

## 2021-03-02 RX ADMIN — Medication 5 MG: at 20:13

## 2021-03-02 RX ADMIN — ASPIRIN 81 MG: 81 TABLET, FILM COATED ORAL at 12:19

## 2021-03-02 RX ADMIN — SODIUM CHLORIDE: 9 INJECTION, SOLUTION INTRAVENOUS at 20:12

## 2021-03-02 RX ADMIN — ATORVASTATIN CALCIUM 20 MG: 20 TABLET, FILM COATED ORAL at 20:11

## 2021-03-02 RX ADMIN — GABAPENTIN 300 MG: 300 CAPSULE ORAL at 12:19

## 2021-03-02 RX ADMIN — ENOXAPARIN SODIUM 40 MG: 40 INJECTION SUBCUTANEOUS at 12:20

## 2021-03-02 RX ADMIN — METOPROLOL SUCCINATE 12.5 MG: 25 TABLET, EXTENDED RELEASE ORAL at 12:19

## 2021-03-02 RX ADMIN — GABAPENTIN 300 MG: 300 CAPSULE ORAL at 20:11

## 2021-03-02 RX ADMIN — LOSARTAN POTASSIUM 25 MG: 25 TABLET, FILM COATED ORAL at 12:19

## 2021-03-02 RX ADMIN — IODIXANOL 250 ML: 270 INJECTION, SOLUTION INTRAVASCULAR at 08:21

## 2021-03-02 RX ADMIN — Medication 10 ML: at 20:11

## 2021-03-02 RX ADMIN — CEFEPIME HYDROCHLORIDE 2000 MG: 2 INJECTION, POWDER, FOR SOLUTION INTRAVENOUS at 12:25

## 2021-03-02 RX ADMIN — INSULIN LISPRO 2 UNITS: 100 INJECTION, SOLUTION INTRAVENOUS; SUBCUTANEOUS at 17:59

## 2021-03-02 RX ADMIN — DULOXETINE HYDROCHLORIDE 90 MG: 60 CAPSULE, DELAYED RELEASE ORAL at 12:20

## 2021-03-02 RX ADMIN — TAMSULOSIN HYDROCHLORIDE 0.8 MG: 0.4 CAPSULE ORAL at 12:20

## 2021-03-02 RX ADMIN — CILOSTAZOL 100 MG: 50 TABLET ORAL at 12:20

## 2021-03-02 RX ADMIN — CILOSTAZOL 100 MG: 50 TABLET ORAL at 20:10

## 2021-03-02 RX ADMIN — METOPROLOL SUCCINATE 12.5 MG: 25 TABLET, EXTENDED RELEASE ORAL at 20:10

## 2021-03-02 RX ADMIN — CEFEPIME HYDROCHLORIDE 2000 MG: 2 INJECTION, POWDER, FOR SOLUTION INTRAVENOUS at 23:22

## 2021-03-02 RX ADMIN — INSULIN GLARGINE 70 UNITS: 100 INJECTION, SOLUTION SUBCUTANEOUS at 12:21

## 2021-03-02 RX ADMIN — PRAMIPEXOLE DIHYDROCHLORIDE 0.5 MG: 0.25 TABLET ORAL at 20:11

## 2021-03-02 RX ADMIN — PANTOPRAZOLE SODIUM 40 MG: 40 TABLET, DELAYED RELEASE ORAL at 20:11

## 2021-03-02 RX ADMIN — CHOLESTYRAMINE 4 G: 4 POWDER, FOR SUSPENSION ORAL at 12:20

## 2021-03-02 RX ADMIN — VANCOMYCIN HYDROCHLORIDE 1500 MG: 10 INJECTION, POWDER, LYOPHILIZED, FOR SOLUTION INTRAVENOUS at 13:05

## 2021-03-02 RX ADMIN — INSULIN LISPRO 40 UNITS: 100 INJECTION, SOLUTION INTRAVENOUS; SUBCUTANEOUS at 12:22

## 2021-03-02 RX ADMIN — INSULIN LISPRO 40 UNITS: 100 INJECTION, SOLUTION INTRAVENOUS; SUBCUTANEOUS at 18:02

## 2021-03-02 RX ADMIN — INSULIN LISPRO 2 UNITS: 100 INJECTION, SOLUTION INTRAVENOUS; SUBCUTANEOUS at 20:21

## 2021-03-02 RX ADMIN — INSULIN GLARGINE 70 UNITS: 100 INJECTION, SOLUTION SUBCUTANEOUS at 20:23

## 2021-03-02 RX ADMIN — SODIUM CHLORIDE: 9 INJECTION, SOLUTION INTRAVENOUS at 09:13

## 2021-03-02 ASSESSMENT — PAIN SCALES - GENERAL
PAINLEVEL_OUTOF10: 0

## 2021-03-02 NOTE — PROGRESS NOTES
Angiogram reviewed with my partner Dr. Emmanuel Toney. Plan for peripheral intervention tomorrow at 3PM of the AT and pedal arch.   NPO after breakfast     Norma Ellis MD [address] [facility], Interventional Cardiology, and Peripheral Vascular Disease   [facility]   (C): [phone]  (O): [phone]

## 2021-03-02 NOTE — PROGRESS NOTES
4 Eyes Admission Assessment     I agree as the admission nurse that 2 RN's have performed a thorough Head to Toe Skin Assessment on the patient. ALL assessment sites listed below have been assessed on admission. Areas assessed by both nurses:  [x]   Head, Face, and Ears   [x]   Shoulders, Back, and Chest  [x]   Arms, Elbows, and Hands   [x]   Coccyx, Sacrum, and Ischum  [x]   Legs, Feet, and Heels        Does the Patient have Skin Breakdown? No. Redness and flaky skin to LLE. No open wounds.          Brennen Prevention initiated:  N/A  Wound Care Orders initiated:  No      WOC nurse consulted for Pressure Injury (Stage 3,4, Unstageable, DTI, NWPT, and Complex wounds):  No      Nurse 1 eSignature: Electronically signed by Marissa Byrne RN on 3/2/21 at 5:30 AM EST    **SHARE this note so that the co-signing nurse is able to place an eSignature**    Nurse 2 eSignature: {Esignature:[id number]}

## 2021-03-02 NOTE — H&P
Hospital Medicine History & Physical      PCP: Rosemary Barboza DO    Date of Admission: 3/1/2021    Date of Service: Pt seen/examined on 3/1/2021 and Admitted to Inpatient with expected LOS greater than two midnights due to medical therapy. Chief Complaint: Wound dehiscence      History Of Present Illness:  69-year-old gentleman with a significant history of diabetes mellitus type 1, PAD, CAD, right BKA, left foot transmetatarsal amputation 5 weeks ago at  sutures were removed 3 weeks ago came into ER with a concern for wound dehiscence Patient states his dressing to his left lower extremities been changed with Betadine soaked gauze, dry sterile dressing. He denies any purulent drainage or malodor from the wound. Patient denies any pain to the left lower extremity, denies constitutional symptoms including but not limited to nausea, vomiting, fever, chills, shortness breath, chest pain. Past Medical History:      History reviewed. No pertinent past medical history. Diabetes mellitus type 1, hypertension, CAD    Past Surgical History:      History reviewed. No pertinent surgical history. S/p CABG, right BKA, left transmetatarsal amputation    Medications Prior to Admission:      Prior to Admission medications    Medication Sig Start Date End Date Taking? Authorizing Provider   cilostazol (PLETAL) 100 MG tablet Take 100 mg by mouth 2 times daily   Yes Historical Provider, MD   colestipol (COLESTID) 1 g tablet Take 1 g by mouth 2 times daily   Yes Historical Provider, MD   DULoxetine (CYMBALTA) 30 MG extended release capsule Take 90 mg by mouth daily   Yes Historical Provider, MD   furosemide (LASIX) 40 MG tablet Take 40 mg by mouth 2 times daily   Yes Historical Provider, MD   gabapentin (NEURONTIN) 300 MG capsule Take 300 mg by mouth 3 times daily.     Yes Historical Provider, MD   losartan (COZAAR) 25 MG tablet Take 25 mg by mouth daily   Yes Historical Provider, MD   metoprolol succinate (TOPROL XL) 25 MG extended release tablet Take 12.5 mg by mouth 2 times daily    Yes Historical Provider, MD   naproxen (NAPROSYN) 500 MG tablet Take 500 mg by mouth 2 times daily (with meals)   Yes Historical Provider, MD   pantoprazole (PROTONIX) 40 MG tablet Take 40 mg by mouth 2 times daily   Yes Historical Provider, MD   potassium chloride (MICRO-K) 10 MEQ extended release capsule Take 10 mEq by mouth daily   Yes Historical Provider, MD   pramipexole (MIRAPEX) 0.5 MG tablet Take 0.5 mg by mouth nightly    Yes Historical Provider, MD   simvastatin (ZOCOR) 40 MG tablet Take 40 mg by mouth nightly   Yes Historical Provider, MD   tamsulosin (FLOMAX) 0.4 MG capsule Take 0.8 mg by mouth daily   Yes Historical Provider, MD   aspirin 81 MG EC tablet Take 81 mg by mouth daily   Yes Historical Provider, MD   Misc Natural Products (GLUCOSAMINE CHOND CMP ADVANCED PO) Take 1 tablet by mouth daily   Yes Historical Provider, MD   insulin glargine (BASAGLAR KWIKPEN) 100 UNIT/ML injection pen Inject 70 Units into the skin 2 times daily    Yes Historical Provider, MD   insulin lispro (HUMALOG) 100 UNIT/ML injection vial Inject 40 Units into the skin 3 times daily (before meals) + sliding scale   Yes Historical Provider, MD       Allergies:  Patient has no known allergies. Social History:      The patient currently lives home    TOBACCO:   reports that he has never smoked. He has never used smokeless tobacco.  ETOH:   reports previous alcohol use. Family History:       Reviewed in detail and negative for DM, CAD, Cancer, CVA. History reviewed. No pertinent family history. REVIEW OF SYSTEMS:   Pertinent positives as noted in the HPI. All other systems reviewed and negative. PHYSICAL EXAM PERFORMED:    BP (!) 150/58   Pulse 94   Temp 97.5 °F (36.4 °C) (Oral)   Resp 20   SpO2 94%     General appearance:  No apparent distress, appears stated age and cooperative.   HEENT:  Normal cephalic, atraumatic without obvious deformity. Pupils equal, round, and reactive to light. Extra ocular muscles intact. Conjunctivae/corneas clear. Neck: Supple, with full range of motion. No jugular venous distention. Trachea midline. Respiratory:  Normal respiratory effort. Clear to auscultation, bilaterally without Rales/Wheezes/Rhonchi. Cardiovascular:  Regular rate and rhythm with normal S1/S2 without murmurs, rubs or gallops. Abdomen: Soft, non-tender, non-distended with normal bowel sounds. Musculoskeletal: Right AKA, stump covered with dressing, left foot transmetatarsal amputation covered with dressing no obvious drainage. Pictures from podiatry notes reviewed. Neurologic:  Neurovascularly intact without any focal sensory/motor deficits. Cranial nerves: II-XII intact, grossly non-focal.  Psychiatric:  Alert and oriented, thought content appropriate, normal insight  Capillary Refill: Brisk,< 3 seconds   Peripheral Pulses: +2 palpable, equal bilaterally       Labs:     Recent Labs     03/01/21  1655   WBC 7.8   HGB 12.5*   HCT 38.2*        Recent Labs     03/01/21  1655   *   K 4.4   CL 96*   CO2 30   BUN 15   CREATININE 1.1   CALCIUM 9.1     No results for input(s): AST, ALT, BILIDIR, BILITOT, ALKPHOS in the last 72 hours. No results for input(s): INR in the last 72 hours. No results for input(s): Hieu Lager in the last 72 hours. Urinalysis:    No results found for: Elisa Lute, BACTERIA, [address] [facility], [facility], [address], [address] [facility] [address]    Radiology:     CXR: I have reviewed the CXR with the following interpretation: N/A  EKG:  I have reviewed the EKG with the following interpretation: not available to review. XR FOOT LEFT (2 VIEWS)   Final Result   Impression: Limited study, as above. No comparisons are available. ASSESSMENT:    Active Hospital Problems    Diagnosis Date Noted    Wound infection [T14. 8XXA, L08.9] 03/01/2021       Assessment and plan  #Left lower extremity wound dehiscence  #Critical limb ischemia   S/p left transmetatarsal amputation 5 weeks sutures were removed  3 weeks ago. Per pathology report clean margins were obtained. Evaluator of podiatry no concern for infection. Will hold on antibiotic at this point. No leukocytosis. Discussed with Dr. Montserrat Hobson for diagnostic angiogram today. Start normal saline 50 cc/h in order to avoid MARIAH  Continue aspirin, Pletal    #Diabetes mellitus type 1  Resume Lantus 70 units twice daily along with Humalog 40 units 3 times daily with meals we will do additional insulin sliding scale. #CAD hx of CABG  Continue losartan, metoprolol hold Lasix continue statin    DVT Prophylaxis: lovenox  Diet: No diet orders on file Carb control. NPO after midnighte  Code Status: No Order Full Code    PT/OT Eval Status: after procedure    Dispo - inpatient. Pending clinical course. This chart was likely completed using voice recognition technology and may contain unintended grammatical , phraseology,and/or punctuation errors         Odette Duran MD    Thank you Adama Hardy DO for the opportunity to be involved in this patient's care. If you have any questions or concerns please feel free to contact me at [phone].

## 2021-03-02 NOTE — CARE COORDINATION
Case Management Assessment           Initial Evaluation                Date / Time of Evaluation: 3/2/2021 5:44 PM                 Assessment Completed by: Elidia Cutler    Patient Name: Azael Mc     YOB: 1960  Diagnosis: Wound infection [T14. 8XXA, L08.9]     Date / Time: 3/1/2021  3:05 PM    Patient Admission Status: Inpatient    If patient is discharged prior to next notation, then this note serves as note for discharge by case management. Current PCP: Raine Brennan DO  Clinic Patient: No    Chart Reviewed: Yes  Patient/ Family Interviewed: Yes    Initial assessment completed at bedside with: patient-phone    Hospitalization in the last 30 days: No    Emergency Contacts:  Extended Emergency Contact Information  Primary Emergency Contact: JAMIE Nguyen [address] Phone: [phone]  Relation: Other  Secondary Emergency Contact: annika crystal  Home Phone: [phone]  Relation: Child   needed? No    Advance Directives:   Code Status: Full Code    Financial  Payor: Debi Mike / Plan: Magalys Edward COMPLETE / Product Type: *No Product type* /     Pre-cert required for SNF: Yes    Pharmacy  No Pharmacies Listed    Potential assistance Purchasing Medications: Potential Assistance Purchasing Medications: Yes  Does Patient want to participate in local refill/ meds to beds program?: Yes    Meds To Beds General Rules:  1. Can ONLY be done Monday- Friday between 8:30am-5pm  2. Prescription(s) must be in pharmacy by 3pm to be filled same day  3. Copy of patient's insurance/ prescription drug card and patient face sheet must be sent along with the prescription(s)  4. Cost of Rx cannot be added to hospital bill. If financial assistance is needed, please contact unit  or ;  or  CANNOT provide pharmacy voucher for patients co-pays  5.  Patients can then  the prescription on their way out of the hospital at discharge, or pharmacy can deliver to the bedside if staff is available. (payment due at time of pick-up or delivery - cash, check, or card accepted)     Able to afford home medications/ co-pay costs: Yes    ADLS  Support Systems: Spouse/Significant Other    PT AM-PAC:   /24  OT AM-PAC:   /24    [place]: trailer -shares with roommate at this point  Steps: ramp    Plans to RETURN to current housing: Yes  Barriers to RETURNING to current housing: medical complications    Home Care Information  Currently ACTIVE with [address]: Yes  [address]: [facility]  Phone: [phone]  Fax: [phone]    Currently ACTIVE with [facility]: No-lives in [address]  [facility] [place]: Guilherme Mcnamara  Equipment: CPAP    Home Oxygen and Respiratory Equipment  Has HOME OXYGEN prior to admission: No  DISCHARGE PLAN:  Disposition: Home with [address]: [facility]     Transportation PLAN for discharge: friend     Factors facilitating achievement of predicted outcomes: Motivated, Cooperative and Pleasant    Barriers to discharge: Medical complications    Additional Case Management Notes:Patient is having difficulty living at home with the person he lives with. Person is mentally unstable a, but splits the bills 50/50 supposedly. Patient wants some ideas to relocate. Patient cannot live with his children. Patient cannot afford AL. Patient has not looked int any senior services and would be in [address]  The Plan for Transition of Care is related to the following treatment goals of Wound infection [T14. 8XXA, L08.9]    The Patient and/or patient representative Stephen and his family were provided with a choice of provider and agrees with the discharge plan Yes    Freedom of choice list was provided with basic dialogue that supports the patient's individualized plan of care/goals and shares the quality data associated with the providers.  Yes    Care Transition

## 2021-03-02 NOTE — PLAN OF CARE
Problem: Falls - Risk of:  Goal: Will remain free from falls  Description: Will remain free from falls  Outcome: Ongoing  Note: Pt at risk for falls. BKA on RLE and partial toe amputation on LLE. Pt NWB to LLE. Bed locked in lowest position with bed alarm on. Personal belongings and call light within reach. Will continue to monitor. Problem: Skin Integrity:  Goal: Will show no infection signs and symptoms  Description: Will show no infection signs and symptoms  Outcome: Ongoing  Note: Pt afebrile with a WBC WDL. No new signs/symptoms of infection at this time. Will continue to monitor. Problem: Skin Integrity:  Goal: Absence of new skin breakdown  Description: Absence of new skin breakdown  Outcome: Ongoing  Note: Skin assessment performed per chart. No new areas of skin breakdown at this time. Will continue to monitor.

## 2021-03-02 NOTE — PROGRESS NOTES
Hospitalist Progress Note      PCP: Stephen Ulloa DO    Date of Admission: 3/1/2021    Chief Complaint: Wound dehiscence    Hospital Course: 34-year-old gentleman with a significant history of diabetes mellitus type 1, PAD, CAD, right BKA, left foot transmetatarsal amputation 5 weeks ago at  sutures were removed 3 weeks ago came into ER with a concern for wound dehiscence     S/p peripheral diagnostic angiogram on 3/2 showed 99% stenosis of left anterior tibial artery with subtotal occlusion of the left distal dorsal pedis artery. Subjective: Patient was seen after the peripheral angiogram today. Wanted to sit up. Denies any pain in his legs. Denies chest pain, shortness of breath. No acute event reported overnight.       Medications:  Reviewed    Infusion Medications    sodium chloride 50 mL/hr at 03/02/21 0913     Scheduled Medications    sodium chloride flush  10 mL Intravenous 2 times per day    cefepime  2,000 mg Intravenous Q12H    vancomycin  1,500 mg Intravenous Q12H    aspirin  81 mg Oral Daily    cilostazol  100 mg Oral BID    cholestyramine light  4 g Oral Daily    DULoxetine  90 mg Oral Daily    gabapentin  300 mg Oral TID    insulin glargine  70 Units Subcutaneous BID    metoprolol succinate  12.5 mg Oral BID    losartan  25 mg Oral Daily    pantoprazole  40 mg Oral BID    pramipexole  0.5 mg Oral Nightly    atorvastatin  20 mg Oral Nightly    tamsulosin  0.8 mg Oral Daily    insulin lispro  0-6 Units Subcutaneous TID WC    insulin lispro  0-3 Units Subcutaneous Nightly    sodium chloride flush  10 mL Intravenous 2 times per day    enoxaparin  40 mg Subcutaneous Daily    insulin lispro (1 Unit Dial)  40 Units Subcutaneous TID AC     PRN Meds: sodium chloride flush, promethazine **OR** ondansetron, polyethylene glycol, acetaminophen **OR** acetaminophen      Intake/Output Summary (Last 24 hours) at 3/2/2021 1328  Last data filed at 3/2/2021 0646  Gross per 24 hour Intake 700.7 ml   Output 1100 ml   Net -399.3 ml       Physical Exam Performed:    BP (!) 162/64   Pulse 101   Temp 97.8 °F (36.6 °C) (Oral)   Resp 20   Ht 5' 6\" (1.676 m)   Wt 273 lb 9.5 oz (124.1 kg)   SpO2 96%   BMI 44.16 kg/m²     General appearance: No apparent distress, appears stated age and cooperative. HEENT: Pupils equal, round, and reactive to light. Conjunctivae/corneas clear. Neck: Supple, with full range of motion. No jugular venous distention. Trachea midline. Respiratory:  Normal respiratory effort. Clear to auscultation, bilaterally without Rales/Wheezes/Rhonchi. Cardiovascular: Regular rate and rhythm with normal S1/S2 without murmurs, rubs or gallops. Abdomen: Soft, non-tender, non-distended with normal bowel sounds. Musculoskeletal: Right AKA covered with dressing, left foot metatarsal amputation covered with dressing no obvious sign of discharge. Neurologic:  Neurovascularly intact without any focal sensory/motor deficits. Cranial nerves: II-XII intact, grossly non-focal.  Psychiatric: Alert and oriented, thought content appropriate, normal insight  Capillary Refill: Brisk,< 3 seconds   Peripheral Pulses: +2 palpable, equal bilaterally       Labs:   Recent Labs     03/01/21  1655 03/02/21  0637   WBC 7.8 7.4   HGB 12.5* 12.1*   HCT 38.2* 37.1*    248     Recent Labs     03/01/21  1655 03/02/21  0637   * 138   K 4.4 3.9   CL 96* 99   CO2 30 27   BUN 15 19   CREATININE 1.1 1.1   CALCIUM 9.1 8.8     No results for input(s): AST, ALT, BILIDIR, BILITOT, ALKPHOS in the last 72 hours. No results for input(s): INR in the last 72 hours. No results for input(s): Hieu Lager in the last 72 hours. Urinalysis:    No results found for: Georgianne Lute, BACTERIA, RBCUA, BLOODU, SPECGRAV, [address]    Radiology:  XR FOOT LEFT (2 VIEWS)   Final Result   Impression: Limited study, as above. No comparisons are available.       IR ANGIOGRAM EXTREMITY LEFT    (Results Pending) Assessment/Plan:    Active Hospital Problems    Diagnosis Date Noted    Wound infection [T14. 8XXA, L08.9] 03/01/2021     #Left lower extremity wound dehiscence  #Critical limb ischemia   S/p left transmetatarsal amputation 5 weeks sutures were removed  3 weeks ago. Per pathology report clean margins were obtained. Evaluator of podiatry no concern for infection. Will hold on antibiotic at this point. No leukocytosis. S/p peripheral diagnostic angiogram on 3/2 showed 99% stenosis of left anterior tibial artery with subtotal occlusion of the left distal dorsal pedis artery. .  normal saline 50 cc/h in order to avoid MARIAH cardio planning for peripheral intervention tomorrow at 3 PM.    Continue aspirin, Pletal     #Diabetes mellitus type 1  con Lantus 70 units twice daily along with Humalog 40 units 3 times daily with meals we will do additional insulin sliding scale.     #CAD hx of CABG  Continue losartan, metoprolol hold Lasix continue statin    DVT Prophylaxis: lovenox  Diet: DIET CARDIAC;  Code Status: Full Code    PT/OT Eval Status: once more stable    Dispo - inpatient likely d/c in 2-3 days.     This chart was likely completed using voice recognition technology and may contain unintended grammatical , phraseology,and/or punctuation errors      Priti Zaldivar MD

## 2021-03-02 NOTE — OP NOTE
Operative Note      Patient: Darlene Callaway  YOB: 1960  MRN: [id number]    Date of Procedure:     Pre-Op Diagnosis: * Critical Limb ischemia *    Post-Op Diagnosis: Same       Anesthesia: *ASA 2, Mallampati score 2*    Estimated Blood Loss (mL): less than 50     Complications: None      Detailed Description of Procedure:     Consent obtained. Right groin prepped in sterile fashion give lidocaine access right common femoral artery. Pigtail inserted and aorta perform abdominal aortogram.  I made certain left common iliac artery perform selective left lower extremity angiogram.  I am advanced to a Glidewire advantage to distal left superficial femoral artery perform further left lower extremity angiography. Next all catheter wires were removed manual pressure applied to achieve hemostasis no complication. Angiographic findings    #1 abdominal aorta is patent with patent mesenteric's and renal vessels. #2 left common femoral artery profunda femoris is patent. 3.  Left superficial femoral arteries patent with patent left popliteal artery. 4.  Left anterior tibial artery distal 99% stenosis with subtotal occlusion of the distal left dorsalis pedis artery. 5.  Left peroneal artery occludes distally. 6.  Left posterior tibial artery is occluded at the ostium. 7.  Plantar artery is occluded with incomplete plantar loop. Recommendation    #1 consider revascularization of distal left anterior tibial artery dorsalis pedis artery this will be planned for in the next few days.     Antonio Holley MD, MPH    Electronically signed by Antonio Holley MD on 3/2/2021 at 9:37 AM

## 2021-03-02 NOTE — PROGRESS NOTES
Patient received to room 6321 from the ED. Patient A&Ox 4 upon arrival.   Tele monitor applied, rate and rhythm verified with monitor reader. VSS. Patient oriented to room, staff, and call system. Educated on fall protocol and hourly rounding. Assessment as documented. Admission navigator complete. PIV in left hand. Bed locked in low position. Patient informed to utilize call light with any needs. No further needs at this time. Pt verbalized understanding. Call light within reach. Hourly rounding in place. Will continue to monitor.

## 2021-03-02 NOTE — CONSULTS
Clinical Pharmacy Consult Note    Admit date: 3/1/2021    Subjective/Objective:  63 yom with PMHx of DM1, PAD, CAD, R BKA, s/p L foot transmetatarsal amputation earlier this year presented to [facility] ED with concern for wound dehiscence. Angiogram performed 3/2 for critical limb ischemia, recommended to consider revascularization, now scheduled for 3/3. Pharmacy is consulted to dose Vancomycin per Dr. Nicole Redmond    Pertinent Medications:  Cefepime 2g IV q12h -- day # 1  Vancomycin, pharmacy to dose -- day # 1   1.5g IV q12h (3/2-current)    Recent Labs     03/01/21  1655 03/02/21  0637   * 138   K 4.4 3.9   CL 96* 99   CO2 30 27   BUN 15 19   CREATININE 1.1 1.1       Estimated Creatinine Clearance: 88 mL/min (based on SCr of 1.1 mg/dL). Recent Labs     03/01/21  1655 03/02/21  0637   WBC 7.8 7.4   HGB 12.5* 12.1*   HCT 38.2* 37.1*   MCV 84.8 85.4    248       Height:  5' 6\" (167.6 cm)  Weight: 273 lb 9.5 oz (124.1 kg)    Micro:  Blood (3/1): ordered    Assessment/Plan:  1. BL SSTI s/p amputations:  Vancomycin + cefepime - day #1  Vancomycin - pharmacy to dose  · Will start vancomycin 1.5g IV q12h. Provides ~12 mg/kg and is based on an elimination half-life of 9 hours. · Trough will be ordered when appropriate. Target is 10-15 mcg/mL if no concern for OM. · Renal function will be monitored closely and dosing will be adjusted as appropriate. Please call with any questions.   Ayla CesarD, BCPS  Wireless: [id number]  Main pharmacy: [id number]  3/2/2021 11:12 AM

## 2021-03-02 NOTE — FLOWSHEET NOTE
03/02/21 1008   Encounter Summary   Services provided to: Patient   Referral/Consult From:   (Delivered advance directives. )   Continue Visiting   (3/2/2021, SHILA. )   Complexity of Encounter Moderate   Length of Encounter 15 minutes   Routine   Type Initial   Assessment Approachable   Intervention Nurtured hope   Outcome Expressed gratitude   Advance Directives (For Healthcare)   Healthcare Directive   (Delivered HCPOA and Living Will paperwork today. )

## 2021-03-02 NOTE — CONSULTS
Clinical Pharmacy Progress Note  Medication History     Admit Date: 3/1/2021    Asked by Dr. Patel Fortune to clarify home medications. List of current medications patient is taking is complete. Home medication list in EPIC updated to reflect changes noted below. Source of information: patient, outpatient fill records    Nothing was on med rec initially. All medications have been updated and list is now complete. PS sent to Dr. Patel Fortune to order admission medications. Please call with questions.   Carmencita Bryant, PharmD, [address] [place] [address] Pharmacy: [phone]  [address] [place] [address]: [phone]  3/1/2021 7:07 PM

## 2021-03-02 NOTE — CONSULTS
Infectious Diseases Inpatient Consult Note    Medical Student note - reviewed and modified, see Attending addendum at bottom    Reason for Consult:   Outpatient antibiotics for wound dehiscence with exposed bone  Requesting Physician:   Rosalind Reza MD  Primary Care Physician:  Azul Cooper DO  History Obtained From:   Pt, EPIC    Admit Date: 3/1/2021  Hospital Day: 2    CHIEF COMPLAINT:       Chief Complaint   Patient presents with    Post-op Problem     partial foot amputation last Friday. Bone is exposed and wound is open       HISTORY OF PRESENT ILLNESS:      Benson Ann is a 63 y.o. male with a PMHx of T1DM, CABG, PAD, right-sided lower extremity amputation, recent left-sided 5 toe amputations, presents to the ED with concerns of postoperative complications.     On 7/5/99 he underwent transmetatarsal amputation by Dr. Melani Sanchez at [facility] on his left foot due to infection secondary to poorly controlled diabetes. Sutures were removed approximately 3 weeks ago. Since then, he has noticed wound dehiscence for the past 2 weeks. Occasional drainage is also noticed from the wound. Patient has pretty significant peripheral neuropathy and denies any pain sensation. He denies any surrounding redness or ascending redness of the foot. He denies any fever chills, n/v, SOB, or chest pain. Patient recently started seeing Dr. Fredy Juárez who had concerns of left lower extremity chronic limb ischemia. Patient was originally scheduled for a diagnostic angiogram on 3/8, however due to worsening necrosis of his amputation stump, was advised to present to emergency department to be admitted for more urgent intervention. Pt was started on vancomycin and cefepime. Patient went for peripheral angiogram this morning (3/2). Today patient was seen sitting upright in bed. No acute overnight events. Patient denies pain of lower extremity at this time. He states his left foot is \"tender\" when touched.  He denies any other acute complaints. Past Medical History:    Past Medical History:   Diagnosis Date    CAD (coronary artery disease)     [facility]erebral [facility] occlusion with [facility] infarction [facility] [address] [facility]     Diabetes mellitus ([facility] [address]        Past Surgical History:    Past Surgical History:   Procedure Laterality Date    CORONARY ARTERY BYPASS GRAFT      LEG AMPUTATION BELOW KNEE Right        Current Medications:     sodium chloride flush  10 mL Intravenous 2 times per day    cefepime  2,000 mg Intravenous Q12H    vancomycin  1,500 mg Intravenous Q12H    aspirin  81 mg Oral Daily    cilostazol  100 mg Oral BID    cholestyramine light  4 g Oral Daily    DULoxetine  90 mg Oral Daily    gabapentin  300 mg Oral TID    insulin glargine  70 Units Subcutaneous BID    metoprolol succinate  12.5 mg Oral BID    losartan  25 mg Oral Daily    pantoprazole  40 mg Oral BID    pramipexole  0.5 mg Oral Nightly    atorvastatin  20 mg Oral Nightly    tamsulosin  0.8 mg Oral Daily    insulin lispro  0-6 Units Subcutaneous TID WC    insulin lispro  0-3 Units Subcutaneous Nightly    sodium chloride flush  10 mL Intravenous 2 times per day    enoxaparin  40 mg Subcutaneous Daily    insulin lispro (1 Unit Dial)  40 Units Subcutaneous TID AC       Allergies:  Patient has no known allergies. Social History:    TOBACCO:    Never  ETOH:    Not currently  DRUGS:   Never  MARITAL STATUS:   Single  OCCUPATION:   Retired    Family History:   No immunodeficiency    REVIEW OF SYSTEMS:    No fever / chills / sweats. No weight loss. No visual change, eye pain, eye discharge. No oral lesion, sore throat, dysphagia. Denies cough / sputum. Denies chest pain, palpitations. Denies n / v / abd pain. No diarrhea. Denies dysuria or change in urinary function. Denies joint swelling or pain. No myalgia, arthralgia. Denies focal weakness, or other neurologic symptom; Confirms sensory changes that are chronic, no acute changes.    Denies new / worse depression, psychiatric symptoms    PHYSICAL EXAM:      Vitals:    BP (!) 170/69   Pulse 104   Temp 97.5 °F (36.4 °C) (Oral)   Resp 21   Ht 5' 6\" (1.676 m)   Wt 273 lb 9.5 oz (124.1 kg)   SpO2 97%   BMI 44.16 kg/m²     GENERAL: No apparent distress. HEENT: Membranes moist, no oral lesion, PERRL  NECK:  Supple, no lymphadenopathy  LUNGS: Clear b/l, no rales, no dullness  CARDIAC: RRR, no murmur appreciated  ABD:  + BS, soft / NT  EXT:  Right BKA well healed, left transmetatarsal amputations dehisced with exposed bone and purplish discoloration and skin findings of venous stasis. Dressed. NEURO: No focal neurologic findings  PSYCH: Orientation, sensorium, mood normal  LINES:  Peripheral iv    DATA:    Lab Results   Component Value Date    WBC 7.4 03/02/2021    HGB 12.1 (L) 03/02/2021    HCT 37.1 (L) 03/02/2021    MCV 85.4 03/02/2021     03/02/2021     Lab Results   Component Value Date    CREATININE 1.1 03/02/2021    BUN 19 03/02/2021     03/02/2021    K 3.9 03/02/2021    CL 99 03/02/2021    CO2 27 03/02/2021       Hepatic Function Panel: No results found for: ALKPHOS, ALT, AST, PROT, BILITOT, BILIDIR, IBILI, LABALBU    Micro:  NA      Imaging:   XR L Foot -   Vascular calcification is noted. There is no comparison study to immediate postoperative radiographs or presurgical appearance of the foot. No discrete soft tissue gas.       If concerned for ongoing infection, MRI without and with contrast is recommend. IR Angiogram L extremity -   1. abdominal aorta is patent with patent mesenteric's and renal vessels. 2. left common femoral artery profunda femoris is patent. 3.  Left superficial femoral arteries patent with patent left popliteal artery. 4.  Left anterior tibial artery distal 99% stenosis with subtotal occlusion of the distal left dorsalis pedis artery. 5.  Left peroneal artery occludes distally. 6.  Left posterior tibial artery is occluded at the ostium.   7.  Plantar artery is occluded with incomplete plantar loop. IMPRESSION:      Patient Active Problem List   Diagnosis    Wound infection    Critical lower limb ischemia       Pat Shah is a 63 y.o. male with a PMHx of T1DM, CABG, PAD, right-sided lower extremity amputation, recent left-sided 5 toe amputations, presents to the ED with concerns of postoperative complications. Left anterior tibial artery 99% stenosed; L peroneal and posterior tibial artery occluded; subtotal occlusion of the left distal dorsal pedis. No fever, no leukocytosis  CRP (38.6) and ESR (99) elevated  No drainage noted - no wound cx obtained  No acute signs of infection    Left foot post-surgical wound dehiscence w/o signs of infection. RECOMMENDATIONS:    See below  Plan for revascularization of distal left anterior tibial, dorsalis pedis tomorrow at 3pm per cardio    Discussed with MD Preston Choudhary, MS4      Addendum to Medical Student Consult note:  Pt seen,examined and evaluated. I have independently performed history, physical exam, lab and data review. I have determined assessment and plan as documented by student Barak Chau). 64 yo DM, neuropathy, PVD, CAD / CABG  Hx L LE PVD  Recent L TMA - 1/7/21     Pt had dehiscence of L TMA wound weeks / months ago (pt is vague historian)  No drainage, no pain. High BS    Admit 3/1 -   Seen by Podiatry, Cardiology  Reviewed images of stump wound.   Exposed MT 1/2/3 per Podiatry note  Started on vancomycin + cefepime  L LE cath today with L ant tib a stenosis, L peroneal / post tib a occlusion    IMP/  Mult co-morbidities including DM, PVD  L TMA dehiscence with exposed MT  PVD    REC/  Start doxycycline for gram positive coverage   D/c vanco / cefepime  Agree with plan for revascularization  Revision and closure TMA wound per Podiatry    Further antibiotics / antibiotics at discharge depends on whether TMA is revised (remove exposed MTs)  Discussed with pt  Hernán Genao Sonu Longoria MD

## 2021-03-02 NOTE — PROGRESS NOTES
Podiatric Surgery Daily Progress Note  Stephen Fish      Subjective :   Patient seen and examined this am at the bedside. Patient denies any acute overnight events. Patient denies N/V/F/C/SOB. Patient denies calf pain, thigh pain, chest pain. Review of Systems: A 12 point review of symptoms is unremarkable with the exception of the chief complaint. Patient specifically denies nausea, fever, vomiting, chills, shortness of breath, chest pain, abdominal pain, constipation or difficulty urinating. Objective     /60   Pulse 97   Temp 98 °F (36.7 °C) (Oral)   Resp 16   Ht 5' 6\" (1.676 m)   Wt 273 lb 9.5 oz (124.1 kg)   SpO2 98%   BMI 44.16 kg/m²     I/O:    Intake/Output Summary (Last 24 hours) at 3/2/2021 0618  Last data filed at 3/1/2021 2302  Gross per 24 hour   Intake 317.6 ml   Output 1100 ml   Net -782.4 ml              Wt Readings from Last 3 Encounters:   03/01/21 273 lb 9.5 oz (124.1 kg)       LABS:    Recent Labs     03/01/21  1655   WBC 7.8   HGB 12.5*   HCT 38.2*           Recent Labs     03/01/21  1655   *   K 4.4   CL 96*   CO2 30   BUN 15   CREATININE 1.1      No results for input(s): PROT, INR, APTT in the last 72 hours. LOWER EXTREMITY EXAMINATION    Dressing to the left lower extremity left clean, dry, intact at this time. No strikethrough drainage noted to the external layers of the dressing. Patient is able to flex/extend his foot at the ankle. No pain with calf compression of the left lower extremity. BKA to the right lower extremity. IMAGING:  Narrative   XR FOOT LEFT (2 VIEWS)       Indication: assess for underlying gas formation at recent amputation site        COMPARISON: None       Findings: AP and lateral views of the left foot were obtained. Transmetatarsal amputation of all digits is noted. There is overlying packing material at the surgical site.       Vascular calcification is noted.  There is no comparison study to immediate postoperative

## 2021-03-02 NOTE — PROGRESS NOTES
Physical Therapy/Occupational Therapy   Hold  Orders received and chart reviewed. Per RN pt just returned from angio of LE and is on hold x4 hrs. PT/OT will attempt to evaluate pt at later time vs later date. RN aware.      Kiya Fofana, PT, DPT   Aisha Ramirez, MOT, OTR/L

## 2021-03-03 ENCOUNTER — APPOINTMENT (OUTPATIENT)
Dept: GENERAL RADIOLOGY | Age: 61
DRG: 474 | End: 2021-03-03
Payer: MEDICARE

## 2021-03-03 ENCOUNTER — APPOINTMENT (OUTPATIENT)
Dept: INTERVENTIONAL RADIOLOGY/VASCULAR | Age: 61
DRG: 474 | End: 2021-03-03
Payer: MEDICARE

## 2021-03-03 LAB
ANION GAP SERPL CALCULATED.3IONS-SCNC: 5 MMOL/L (ref 3–16)
BUN BLDV-MCNC: 21 MG/DL (ref 7–20)
CALCIUM SERPL-MCNC: 8.8 MG/DL (ref 8.3–10.6)
CHLORIDE BLD-SCNC: 103 MMOL/L (ref 99–110)
CO2: 29 MMOL/L (ref 21–32)
CREAT SERPL-MCNC: 1.2 MG/DL (ref 0.8–1.3)
EKG ATRIAL RATE: 87 BPM
EKG DIAGNOSIS: NORMAL
EKG P AXIS: 50 DEGREES
EKG P-R INTERVAL: 118 MS
EKG Q-T INTERVAL: 362 MS
EKG QRS DURATION: 72 MS
EKG QTC CALCULATION (BAZETT): 435 MS
EKG R AXIS: -2 DEGREES
EKG T AXIS: 61 DEGREES
EKG VENTRICULAR RATE: 87 BPM
GFR AFRICAN AMERICAN: >60
GFR NON-AFRICAN AMERICAN: >60
GLUCOSE BLD-MCNC: 139 MG/DL (ref 70–99)
GLUCOSE BLD-MCNC: 158 MG/DL (ref 70–99)
GLUCOSE BLD-MCNC: 192 MG/DL (ref 70–99)
GLUCOSE BLD-MCNC: 204 MG/DL (ref 70–99)
HCT VFR BLD CALC: 35.1 % (ref 40.5–52.5)
HEMOGLOBIN: 11.5 G/DL (ref 13.5–17.5)
INR BLD: 1.01 (ref 0.86–1.14)
MCH RBC QN AUTO: 27.9 PG (ref 26–34)
MCHC RBC AUTO-ENTMCNC: 32.9 G/DL (ref 31–36)
MCV RBC AUTO: 84.7 FL (ref 80–100)
PDW BLD-RTO: 15.2 % (ref 12.4–15.4)
PERFORMED ON: ABNORMAL
PLATELET # BLD: 229 K/UL (ref 135–450)
PMV BLD AUTO: 7.3 FL (ref 5–10.5)
POC ACT LR: 257 SEC
POTASSIUM REFLEX MAGNESIUM: 3.8 MMOL/L (ref 3.5–5.1)
PROTHROMBIN TIME: 11.7 SEC (ref 10–13.2)
RBC # BLD: 4.14 M/UL (ref 4.2–5.9)
SARS-COV-2, NAAT: NOT DETECTED
SODIUM BLD-SCNC: 137 MMOL/L (ref 136–145)
WBC # BLD: 6.1 K/UL (ref 4–11)

## 2021-03-03 PROCEDURE — C1769 GUIDE WIRE: HCPCS

## 2021-03-03 PROCEDURE — 6360000004 HC RX CONTRAST MEDICATION: Performed by: INTERNAL MEDICINE

## 2021-03-03 PROCEDURE — 87077 CULTURE AEROBIC IDENTIFY: CPT

## 2021-03-03 PROCEDURE — B4101ZZ FLUOROSCOPY OF ABDOMINAL AORTA USING LOW OSMOLAR CONTRAST: ICD-10-PCS | Performed by: INTERNAL MEDICINE

## 2021-03-03 PROCEDURE — 87205 SMEAR GRAM STAIN: CPT

## 2021-03-03 PROCEDURE — 99152 MOD SED SAME PHYS/QHP 5/>YRS: CPT

## 2021-03-03 PROCEDURE — 047Q3ZZ DILATION OF LEFT ANTERIOR TIBIAL ARTERY, PERCUTANEOUS APPROACH: ICD-10-PCS | Performed by: INTERNAL MEDICINE

## 2021-03-03 PROCEDURE — 87070 CULTURE OTHR SPECIMN AEROBIC: CPT

## 2021-03-03 PROCEDURE — 75774 ARTERY X-RAY EACH VESSEL: CPT

## 2021-03-03 PROCEDURE — 97530 THERAPEUTIC ACTIVITIES: CPT

## 2021-03-03 PROCEDURE — 2580000003 HC RX 258: Performed by: PODIATRIST

## 2021-03-03 PROCEDURE — 85027 COMPLETE CBC AUTOMATED: CPT

## 2021-03-03 PROCEDURE — 94150 VITAL CAPACITY TEST: CPT

## 2021-03-03 PROCEDURE — 94664 DEMO&/EVAL PT USE INHALER: CPT

## 2021-03-03 PROCEDURE — C1725 CATH, TRANSLUMIN NON-LASER: HCPCS

## 2021-03-03 PROCEDURE — 93005 ELECTROCARDIOGRAM TRACING: CPT | Performed by: PODIATRIST

## 2021-03-03 PROCEDURE — 36415 COLL VENOUS BLD VENIPUNCTURE: CPT

## 2021-03-03 PROCEDURE — C1894 INTRO/SHEATH, NON-LASER: HCPCS

## 2021-03-03 PROCEDURE — 1200000000 HC SEMI PRIVATE

## 2021-03-03 PROCEDURE — 2580000003 HC RX 258: Performed by: INTERNAL MEDICINE

## 2021-03-03 PROCEDURE — 99232 SBSQ HOSP IP/OBS MODERATE 35: CPT | Performed by: INTERNAL MEDICINE

## 2021-03-03 PROCEDURE — B41G1ZZ FLUOROSCOPY OF LEFT LOWER EXTREMITY ARTERIES USING LOW OSMOLAR CONTRAST: ICD-10-PCS | Performed by: INTERNAL MEDICINE

## 2021-03-03 PROCEDURE — 75710 ARTERY X-RAYS ARM/LEG: CPT

## 2021-03-03 PROCEDURE — 37228 HC TIB PER TERRITORY PLASTY: CPT

## 2021-03-03 PROCEDURE — 71046 X-RAY EXAM CHEST 2 VIEWS: CPT

## 2021-03-03 PROCEDURE — 85610 PROTHROMBIN TIME: CPT

## 2021-03-03 PROCEDURE — 93010 ELECTROCARDIOGRAM REPORT: CPT | Performed by: INTERNAL MEDICINE

## 2021-03-03 PROCEDURE — 87184 SC STD DISK METHOD PER PLATE: CPT

## 2021-03-03 PROCEDURE — 97166 OT EVAL MOD COMPLEX 45 MIN: CPT

## 2021-03-03 PROCEDURE — 047W3ZZ DILATION OF LEFT FOOT ARTERY, PERCUTANEOUS APPROACH: ICD-10-PCS | Performed by: INTERNAL MEDICINE

## 2021-03-03 PROCEDURE — 37228 PR REVSC OPN/PRQ TIB/PERO W/ANGIOPLASTY UNI: CPT | Performed by: INTERNAL MEDICINE

## 2021-03-03 PROCEDURE — 99153 MOD SED SAME PHYS/QHP EA: CPT

## 2021-03-03 PROCEDURE — 2709999900 HC NON-CHARGEABLE SUPPLY

## 2021-03-03 PROCEDURE — 75710 ARTERY X-RAYS ARM/LEG: CPT | Performed by: INTERNAL MEDICINE

## 2021-03-03 PROCEDURE — 85347 COAGULATION TIME ACTIVATED: CPT

## 2021-03-03 PROCEDURE — 2060000000 HC ICU INTERMEDIATE R&B

## 2021-03-03 PROCEDURE — 37232 PR REVSC OPN/PRQ TIB/PERO W/ANGIOPLASTY UNI EA VSL: CPT | Performed by: INTERNAL MEDICINE

## 2021-03-03 PROCEDURE — C1887 CATHETER, GUIDING: HCPCS

## 2021-03-03 PROCEDURE — 6370000000 HC RX 637 (ALT 250 FOR IP): Performed by: INTERNAL MEDICINE

## 2021-03-03 PROCEDURE — 6360000002 HC RX W HCPCS: Performed by: PODIATRIST

## 2021-03-03 PROCEDURE — 97162 PT EVAL MOD COMPLEX 30 MIN: CPT

## 2021-03-03 PROCEDURE — 87186 SC STD MICRODIL/AGAR DIL: CPT

## 2021-03-03 PROCEDURE — 80048 BASIC METABOLIC PNL TOTAL CA: CPT

## 2021-03-03 PROCEDURE — 87635 SARS-COV-2 COVID-19 AMP PRB: CPT

## 2021-03-03 RX ORDER — IODIXANOL 320 MG/ML
200 INJECTION, SOLUTION INTRAVASCULAR
Status: COMPLETED | OUTPATIENT
Start: 2021-03-03 | End: 2021-03-03

## 2021-03-03 RX ORDER — IPRATROPIUM BROMIDE AND ALBUTEROL SULFATE 2.5; .5 MG/3ML; MG/3ML
1 SOLUTION RESPIRATORY (INHALATION) EVERY 4 HOURS PRN
Status: DISCONTINUED | OUTPATIENT
Start: 2021-03-03 | End: 2021-03-11 | Stop reason: HOSPADM

## 2021-03-03 RX ORDER — SODIUM CHLORIDE 0.9 % (FLUSH) 0.9 %
10 SYRINGE (ML) INJECTION EVERY 12 HOURS SCHEDULED
Status: DISCONTINUED | OUTPATIENT
Start: 2021-03-03 | End: 2021-03-03 | Stop reason: SDUPTHER

## 2021-03-03 RX ORDER — SODIUM CHLORIDE 9 MG/ML
INJECTION, SOLUTION INTRAVENOUS CONTINUOUS
Status: DISCONTINUED | OUTPATIENT
Start: 2021-03-03 | End: 2021-03-04

## 2021-03-03 RX ORDER — SODIUM CHLORIDE 0.9 % (FLUSH) 0.9 %
10 SYRINGE (ML) INJECTION PRN
Status: DISCONTINUED | OUTPATIENT
Start: 2021-03-03 | End: 2021-03-03 | Stop reason: SDUPTHER

## 2021-03-03 RX ORDER — DOXYCYCLINE 50 MG/1
100 CAPSULE ORAL EVERY 12 HOURS SCHEDULED
Status: DISCONTINUED | OUTPATIENT
Start: 2021-03-03 | End: 2021-03-05

## 2021-03-03 RX ORDER — IPRATROPIUM BROMIDE AND ALBUTEROL SULFATE 2.5; .5 MG/3ML; MG/3ML
1 SOLUTION RESPIRATORY (INHALATION)
Status: DISCONTINUED | OUTPATIENT
Start: 2021-03-03 | End: 2021-03-03

## 2021-03-03 RX ORDER — ACETAMINOPHEN 325 MG/1
650 TABLET ORAL EVERY 4 HOURS PRN
Status: DISCONTINUED | OUTPATIENT
Start: 2021-03-03 | End: 2021-03-11 | Stop reason: HOSPADM

## 2021-03-03 RX ADMIN — IODIXANOL 200 ML: 320 INJECTION, SOLUTION INTRAVASCULAR at 16:48

## 2021-03-03 RX ADMIN — Medication 5 MG: at 20:41

## 2021-03-03 RX ADMIN — METOPROLOL SUCCINATE 12.5 MG: 25 TABLET, EXTENDED RELEASE ORAL at 20:41

## 2021-03-03 RX ADMIN — INSULIN LISPRO 1 UNITS: 100 INJECTION, SOLUTION INTRAVENOUS; SUBCUTANEOUS at 20:40

## 2021-03-03 RX ADMIN — PRAMIPEXOLE DIHYDROCHLORIDE 0.5 MG: 0.25 TABLET ORAL at 20:41

## 2021-03-03 RX ADMIN — GABAPENTIN 300 MG: 300 CAPSULE ORAL at 10:16

## 2021-03-03 RX ADMIN — ATORVASTATIN CALCIUM 20 MG: 20 TABLET, FILM COATED ORAL at 20:39

## 2021-03-03 RX ADMIN — CILOSTAZOL 100 MG: 50 TABLET ORAL at 10:15

## 2021-03-03 RX ADMIN — CHOLESTYRAMINE 4 G: 4 POWDER, FOR SUSPENSION ORAL at 10:17

## 2021-03-03 RX ADMIN — LOSARTAN POTASSIUM 25 MG: 25 TABLET, FILM COATED ORAL at 10:16

## 2021-03-03 RX ADMIN — DULOXETINE HYDROCHLORIDE 90 MG: 60 CAPSULE, DELAYED RELEASE ORAL at 10:16

## 2021-03-03 RX ADMIN — SODIUM CHLORIDE: 9 INJECTION, SOLUTION INTRAVENOUS at 17:02

## 2021-03-03 RX ADMIN — DOXYCYCLINE 100 MG: 50 CAPSULE ORAL at 20:40

## 2021-03-03 RX ADMIN — ASPIRIN 81 MG: 81 TABLET, FILM COATED ORAL at 10:15

## 2021-03-03 RX ADMIN — Medication 10 ML: at 20:41

## 2021-03-03 RX ADMIN — INSULIN GLARGINE 70 UNITS: 100 INJECTION, SOLUTION SUBCUTANEOUS at 20:40

## 2021-03-03 RX ADMIN — GABAPENTIN 300 MG: 300 CAPSULE ORAL at 20:40

## 2021-03-03 RX ADMIN — CILOSTAZOL 100 MG: 50 TABLET ORAL at 20:39

## 2021-03-03 RX ADMIN — METOPROLOL SUCCINATE 12.5 MG: 25 TABLET, EXTENDED RELEASE ORAL at 10:16

## 2021-03-03 RX ADMIN — Medication 10 ML: at 09:13

## 2021-03-03 RX ADMIN — SODIUM CHLORIDE: 9 INJECTION, SOLUTION INTRAVENOUS at 20:48

## 2021-03-03 RX ADMIN — PANTOPRAZOLE SODIUM 40 MG: 40 TABLET, DELAYED RELEASE ORAL at 10:16

## 2021-03-03 RX ADMIN — INSULIN LISPRO 2 UNITS: 100 INJECTION, SOLUTION INTRAVENOUS; SUBCUTANEOUS at 17:19

## 2021-03-03 RX ADMIN — TAMSULOSIN HYDROCHLORIDE 0.8 MG: 0.4 CAPSULE ORAL at 10:15

## 2021-03-03 RX ADMIN — PANTOPRAZOLE SODIUM 40 MG: 40 TABLET, DELAYED RELEASE ORAL at 20:41

## 2021-03-03 RX ADMIN — VANCOMYCIN HYDROCHLORIDE 1500 MG: 10 INJECTION, POWDER, LYOPHILIZED, FOR SOLUTION INTRAVENOUS at 01:20

## 2021-03-03 ASSESSMENT — PAIN SCALES - GENERAL
PAINLEVEL_OUTOF10: 0
PAINLEVEL_OUTOF10: 0
PAINLEVEL_OUTOF10: 1

## 2021-03-03 NOTE — PROGRESS NOTES
Hospitalist Progress Note      PCP: Amelia Anaya DO    Date of Admission: 3/1/2021    Chief Complaint: Wound dehiscence    Hospital Course: 58-year-old gentleman with a significant history of diabetes mellitus type 1, PAD, CAD, right BKA, left foot transmetatarsal amputation 5 weeks ago at  sutures were removed 3 weeks ago came into ER with a concern for wound dehiscence     S/p peripheral diagnostic angiogram on 3/2 showed 99% stenosis of left anterior tibial artery with subtotal occlusion of the left distal dorsal pedis artery. Subjective: . Denies any pain in his legs. Denies chest pain, shortness of breath. No acute event reported overnight. Scheduled for peripheral intervention tomorrow at 3PM of the AT and pedal arch with cardiology today.       Medications:  Reviewed    Infusion Medications    sodium chloride 50 mL/hr at 03/02/21 2012     Scheduled Medications    ipratropium-albuterol  1 ampule Inhalation Q4H WA    sodium chloride flush  10 mL Intravenous 2 times per day    cefepime  2,000 mg Intravenous Q12H    vancomycin  1,500 mg Intravenous Q12H    melatonin  5 mg Oral Nightly    aspirin  81 mg Oral Daily    cilostazol  100 mg Oral BID    cholestyramine light  4 g Oral Daily    DULoxetine  90 mg Oral Daily    gabapentin  300 mg Oral TID    insulin glargine  70 Units Subcutaneous BID    metoprolol succinate  12.5 mg Oral BID    losartan  25 mg Oral Daily    pantoprazole  40 mg Oral BID    pramipexole  0.5 mg Oral Nightly    atorvastatin  20 mg Oral Nightly    tamsulosin  0.8 mg Oral Daily    insulin lispro  0-6 Units Subcutaneous TID WC    insulin lispro  0-3 Units Subcutaneous Nightly    sodium chloride flush  10 mL Intravenous 2 times per day    enoxaparin  40 mg Subcutaneous Daily    insulin lispro (1 Unit Dial)  40 Units Subcutaneous TID AC     PRN Meds: sodium chloride flush, promethazine **OR** ondansetron, polyethylene glycol, acetaminophen **OR** acetaminophen      Intake/Output Summary (Last 24 hours) at 3/3/2021 1248  Last data filed at 3/3/2021 9856  Gross per 24 hour   Intake 2092.61 ml   Output 450 ml   Net 1642.61 ml       Physical Exam Performed:    BP (!) 151/71   Pulse 101   Temp 98.2 °F (36.8 °C) (Oral)   Resp 18   Ht 5' 6\" (1.676 m)   Wt 273 lb 9.5 oz (124.1 kg)   SpO2 93%   BMI 44.16 kg/m²     General appearance: No apparent distress, sleeping  HEENT: Pupils equal, round, and reactive to light. Conjunctivae/corneas clear. Neck: Supple, with full range of motion. No jugular venous distention. Trachea midline. Respiratory:  Normal respiratory effort. Clear to auscultation, bilaterally without Rales/Wheezes/Rhonchi. Cardiovascular: Regular rate and rhythm with normal S1/S2 without murmurs, rubs or gallops. Abdomen: Soft, non-tender, non-distended with normal bowel sounds. Musculoskeletal: Right AKA covered with dressing, left foot metatarsal amputation covered with dressing no obvious sign of discharge. Neurologic:  Neurovascularly intact without any focal sensory/motor deficits. Cranial nerves: II-XII intact, grossly non-focal.  Psychiatric: Alert and oriented, thought content appropriate, normal insight  Capillary Refill: Brisk,< 3 seconds   Peripheral Pulses: +2 palpable, equal bilaterally       Labs:   Recent Labs     03/01/21  1655 03/02/21  0637 03/03/21  0642   WBC 7.8 7.4 6.1   HGB 12.5* 12.1* 11.5*   HCT 38.2* 37.1* 35.1*    248 229     Recent Labs     03/01/21  1655 03/02/21  0637 03/03/21  0642   * 138 137   K 4.4 3.9 3.8   CL 96* 99 103   CO2 30 27 29   BUN 15 19 21*   CREATININE 1.1 1.1 1.2   CALCIUM 9.1 8.8 8.8     No results for input(s): AST, ALT, BILIDIR, BILITOT, ALKPHOS in the last 72 hours. Recent Labs     03/03/21  0938   INR 1.01     No results for input(s): Breezy Oiler in the last 72 hours.     Urinalysis:    No results found for: eDe Andrade, [address], BACTERIA, RBCUA, BLOODU, [address], [place] [facility] [place] [facility]    Radiology:  XR CHEST (2 VW)   Final Result      Cardiomegaly. Sternotomy wires are noted. Clear lungs. XR FOOT LEFT (2 VIEWS)   Final Result   Impression: Limited study, as above. No comparisons are available. IR ANGIOGRAM EXTREMITY LEFT    (Results Pending)           Assessment/Plan:    Active Hospital Problems    Diagnosis Date Noted    Open wound of left foot [S91.302A] 03/02/2021    Wound dehiscence, surgical Kristy Hines 03/02/2021    Rupture of operation wound [T81.31XA]     Wound infection [T14. 8XXA, L08.9] 03/01/2021     #Pre Op clearance  RCRI 2, 10% risk of 30-days risk of death, MI or cardiac arrest. He can undergo foot wound closure surgery at acceptable risk with out any further work up  Discussed with patient due to hx of Sleep apnea he may be difficult to extubate or may need oxygen after procedure. He is advised to use incentive spirometer pre and post procedure. He verbalizes the risks. #Left lower extremity wound dehiscence  #Critical limb ischemia   S/p left transmetatarsal amputation 5 weeks sutures were removed  3 weeks ago. Per pathology report clean margins were obtained. ID on board recs [email or web address]. S/p peripheral diagnostic angiogram on 3/2 showed 99% stenosis of left anterior tibial artery with subtotal occlusion of the left distal dorsal pedis artery. .  normal saline 50 cc/h in order to avoid MARIAH cardio planning for peripheral intervention today at 3 PM.  Continue aspirin, Pletal     #Diabetes mellitus type 1  con Lantus 70 units twice daily along with Humalog 40 units 3 times daily with meals and additional insulin sliding scale.     #CAD hx of CABG  Continue losartan, metoprolol hold Lasix continue statin    DVT Prophylaxis: lovenox  Diet: DIET CARDIAC; Dietary Nutrition Supplements: Low Calorie High Protein Supplement  Code Status: Full Code    PT/OT Eval Status: once more stable    Dispo - inpatient likely d/c in 2-3 days.     This chart was

## 2021-03-03 NOTE — OP NOTE
[address]   Procedure Note  Patient Name: Gemini Godwin  YOB: 1960  Date of Operation: 03/03/21    Pre-operative Diagnosis:   1. Peripheral arterial disease with critical limb ischemia, RC6    Post-operative Diagnosis:   1. Same     Procedures Performed:  - Right femoral artery access under fluroscopic and ultrasound guidance  - Right iliofemoral arteriogram.  - Left lower extremity arteriogram  - Selective catheterization of left common femoral artery. - Percutaneous balloon angioplasty of left anterior tibial artery using a 2.0 mm x 100 mm balloon   - Percutaneous balloon angioplasty of left anterior tibial artery using a 3.0 mm x 150 mm balloon   - Percutaneous balloon angioplasty of left anterior plantar artery using a 1.5 mm x 40mm balloon   - Percutaneous balloon angioplasty of left anterior plantar artery using a 2.0 mm x 100 mm balloon   - Completion arteriogram.      Surgeon:  Lico North MD.    Assistant:  None     Anesthesia:  IV sedation and local anesthesia. Estimated Blood Loss:  10 mL. Indications for Procedure:  Gemini Godwin is a 63 y.o. male who was evaluated as an inpatient with chronic arterial insufficiency with ulceration of the left foot and was deemed an appropriate candidate for an angiogram and possible intervention. Informed consent was obtained after discussion of potential benefits, alternatives and risks of the procedure, including but not limited to bleeding, infection, worsening of arterial insufficiency, and kidney injury due to contrast administration. Details of Procedure: The patient was taken to the cardiac cath lab and placed in supine position. IV sedation anesthesia was administered by the anesthesia team. The operative area was clipped, prepared and draped in sterile fashion. A brief time out was performed per hospital protocol.     The right femoral artery was accessed under fluroscopic and ultrasound guidance with a micropuncture needle, wire, then sheath. A 4-Romanian sheath was inserted over a wire. An iliofemoral angiogram was performed. Using an omniflush catheter an an 0.035 advantage wire, the catheter was positioned up and over into the contralateral SFA. A selective left lower extremity angiogram was performed. Left Leg  Common Femoral:   Normal without disease   Profunda: patent without significant disease  Superficial femoral: mild disease. Popliteal: mild disease. Anterior Tibial: is  patent with severe disease  Posterior Tibial:  is 100% occluded   Peroneal: is  patent with moderate disease    A 7 Romanian sheath was inserted over the wire and up-and-over  into the left common femoral artery   and after 60030 units of intravenous heparin was administered and three minutes allowed to elapse. The left anterior tibial artery was recanalized using a 0.014 Fielder XT  wire and a 0.014 micro - catheter. - Percutaneous balloon angioplasty of left anterior tibial artery using a 2.0 mm x 100 mm balloon   - Percutaneous balloon angioplasty of left anterior tibial artery using a 3.0 mm x 150 mm balloon   - Percutaneous balloon angioplasty of left anterior plantar artery using a 1.5 mm x 40mm balloon   - Percutaneous balloon angioplasty of left anterior plantar artery using a 2.0 mm x 100 mm balloon     A completion arteriogram was performed. The sheath was withdrawn over a wire then exchanged for a short sheath. A Proglide closure device was used to close the arteriotomy. The patient tolerated the procedure well, was awakened and was taken to the post-operative care unit in stable condition.      Pre:      Post:       Impression/Plan:   S/p successful AT/anterior plantar artery angioplasty   Recommend asa 81 /plavix 75 mg /Xarelto 2.5mg po bid ; okay to start plavix/xarelto post op tomorrow   Laser doppler in 2 weeks to evaluate for wound healing   May need PT  revascularization in the very near future   Discussed with Dr. Ana De MD [facility], Interventional Cardiology, and Peripheral Vascular Disease   [facility]   (C): [phone]  Yesi Bird): [phone]

## 2021-03-03 NOTE — PROGRESS NOTES
ID Follow-up NOTE  Medical Student note - reviewed and modified, see Attending addendum at bottom    CC:   Dehiscence of TMA amputation site  Antibiotics: Doxycycline    Admit Date: 3/1/2021  Hospital Day: 3    Subjective:     Patient was seen this afternoon, after successful revascularization procedure of left lower extremity. Patient was lethargic, still under effects of anesthesia but was able to answer questions. Patient complaining of bilateral shoulder pain, stating \"it feels like they're being ripped off. \"        Objective:     Patient Vitals for the past 8 hrs:   BP Temp Temp src Pulse Resp SpO2   03/03/21 1218 (!) 151/71 98.2 °F (36.8 °C) Oral 101 18 93 %   03/03/21 0757 138/70 98 °F (36.7 °C) Oral 89 16 94 %     I/O last 3 completed shifts: In: 2092.6 [P.O.:720; I.V.:1372.6]  Out: 450 [Urine:450]  No intake/output data recorded. EXAM:  GENERAL: No apparent distress. HEENT: Membranes moist, no oral lesion  NECK:  Supple, no lymphadenopathy  LUNGS: Clear b/l, no rales, no dullness  CARDIAC: RRR, no murmur appreciated  ABD:  + BS, soft / NT  EXT:  Right BKA well healed. Left LE / stump with dressing (images in Epic with transmetatarsal amputation dehisced with exposed bone. Purplish discoloration with evidence of venous stasis up to approx.  6 inches below the patella.)   NEURO: No focal neurologic findings  PSYCH: Orientation, sensorium, mood normal  LINES:  Peripheral iv       Data Review:  Lab Results   Component Value Date    WBC 6.1 03/03/2021    HGB 11.5 (L) 03/03/2021    HCT 35.1 (L) 03/03/2021    MCV 84.7 03/03/2021     03/03/2021     Lab Results   Component Value Date    CREATININE 1.2 03/03/2021    BUN 21 (H) 03/03/2021     03/03/2021    K 3.8 03/03/2021     03/03/2021    CO2 29 03/03/2021       Hepatic Function Panel: No results found for: ALKPHOS, ALT, AST, PROT, BILITOT, BILIDIR, IBILI, LABALBU    MICRO:  3/3 COVID-19, rapid - not detected  3/3 Cx, Wound - collected IMAGING:  3/1 XR L Foot -   Vascular calcification is noted. There is no comparison study to immediate postoperative radiographs or presurgical appearance of the foot. No discrete soft tissue gas.       If concerned for ongoing infection, MRI without and with contrast is recommend.      3/2 IR Angiogram L extremity -   1. abdominal aorta is patent with patent mesenteric's and renal vessels. 2. left common femoral artery profunda femoris is patent. 3.  Left superficial femoral arteries patent with patent left popliteal artery. 4.  Left anterior tibial artery distal 99% stenosis with subtotal occlusion of the distal left dorsalis pedis artery. 5.  Left peroneal artery occludes distally. 6.  Left posterior tibial artery is occluded at the ostium. 7.  Plantar artery is occluded with incomplete plantar loop.     3/3 XR Chest - Cardiomegaly; Sternotomy wires noted; Clear lungs      Scheduled Meds:   ipratropium-albuterol  1 ampule Inhalation Q4H WA    doxycycline monohydrate  100 mg Oral 2 times per day    sodium chloride flush  10 mL Intravenous 2 times per day    melatonin  5 mg Oral Nightly    aspirin  81 mg Oral Daily    cilostazol  100 mg Oral BID    cholestyramine light  4 g Oral Daily    DULoxetine  90 mg Oral Daily    gabapentin  300 mg Oral TID    insulin glargine  70 Units Subcutaneous BID    metoprolol succinate  12.5 mg Oral BID    losartan  25 mg Oral Daily    pantoprazole  40 mg Oral BID    pramipexole  0.5 mg Oral Nightly    atorvastatin  20 mg Oral Nightly    tamsulosin  0.8 mg Oral Daily    insulin lispro  0-6 Units Subcutaneous TID WC    insulin lispro  0-3 Units Subcutaneous Nightly    sodium chloride flush  10 mL Intravenous 2 times per day    enoxaparin  40 mg Subcutaneous Daily    insulin lispro (1 Unit Dial)  40 Units Subcutaneous TID AC       Continuous Infusions:   sodium chloride 50 mL/hr at 03/02/21 2012       PRN Meds:  sodium chloride flush, promethazine **OR** ondansetron, polyethylene glycol, acetaminophen **OR** acetaminophen      Assessment:     Stephen Fish is a 63 y. o. male with a PMHx of T1DM, CABG, PAD, right-sided lower extremity amputation, recent left-sided TMA amputations of all 5 toes, presents to the ED with concerns of postoperative complications. ED with concerns of postoperative complications.     Left anterior tibial artery 99% stenosed; L peroneal and posterior tibial artery occluded; subtotal occlusion of the left distal dorsal pedis. 3/1 Glucose 358 on admission; No fever (97.5), no leukocytosis (7.8); CRP (38.6), ESR (99) elevated;  Prealbumin low (16.3)     3/2 Glucose (213); No fever, no leukocytosis (7.4)  Tachycardic with elevated RR lasting ~10 hours. No drainage noted - no wound cx obtained  No acute signs of infection    3/3 No fever (98.2); no leukocytosis (6.1); Glucose (139)    Left foot post-surgical wound dehiscence w/ no signs of infection. Plan:     Cont. doxycycline for gram positive coverage  S/p L LE revascularization with Dr. Sonia Pinzon tomorrow for TMA revision/closure pending peripheral outcome per podiatry    Discussed with MD Alessia Carrillohouse MS4    Addendum to Medical Student Progress note:  Pt seen,examined and evaluated. I have independently performed history, physical exam, lab and data review. I have determined assessment and plan as documented by student Jevon Tarango).    62 yo DM, neuropathy, PVD, CAD / CABG  Hx L LE PVD  Recent L TMA - 1/7/21      Pt had dehiscence of L TMA wound weeks / months ago (pt is vague historian)  No drainage, no pain. High BS     Admit 3/1 -   Seen by Podiatry, Cardiology  Reviewed images of stump wound.   Exposed MT 1/2/3 per Podiatry note  Started on vancomycin + cefepime  L LE cath 3/2 with L ant tib a stenosis, L peroneal / post tib a occlusion    Today 3/3, pt had L LE angio with L ant tib and plantar a balloon angioplasty     IMP/  Mult co-morbidities including DM, PVD  L TMA dehiscence with exposed MT  PVD     REC/  Cont doxycycline for gram positive coverage   Revision and closure TMA wound per Podiatry     Further antibiotics / antibiotics at discharge depends on whether TMA is revised (remove exposed MTs)    Discussed with pt  Lesvia Dewitt MD

## 2021-03-03 NOTE — PROGRESS NOTES
Podiatric Surgery Daily Progress Note  Stephen Fish      Subjective :   Patient seen and examined this am at the bedside. Patient denies any acute overnight events. Patient denies N/V/F/C/SOB. Patient denies calf pain, thigh pain, chest pain. Review of Systems: A 12 point review of symptoms is unremarkable with the exception of the chief complaint. Patient specifically denies nausea, fever, vomiting, chills, shortness of breath, chest pain, abdominal pain, constipation or difficulty urinating. Objective     BP (!) 141/65   Pulse 86   Temp 97.6 °F (36.4 °C) (Oral)   Resp 16   Ht 5' 6\" (1.676 m)   Wt 273 lb 9.5 oz (124.1 kg)   SpO2 97%   BMI 44.16 kg/m²     I/O:    Intake/Output Summary (Last 24 hours) at 3/3/2021 0720  Last data filed at 3/3/2021 4115  Gross per 24 hour   Intake 2092.61 ml   Output 450 ml   Net 1642.61 ml              Wt Readings from Last 3 Encounters:   03/01/21 273 lb 9.5 oz (124.1 kg)       LABS:    Recent Labs     03/01/21  1655 03/02/21  0637   WBC 7.8 7.4   HGB 12.5* 12.1*   HCT 38.2* 37.1*    248        Recent Labs     03/02/21  0637      K 3.9   CL 99   CO2 27   BUN 19   CREATININE 1.1      No results for input(s): PROT, INR, APTT in the last 72 hours. FOCUSED LEFT LOWER EXTREMITY EXAMINATION:    Dressing to left lower extremity clean, dry, intact. No strikethrough drainage noted to the internal layers of the dressing. Betadine discolored drainage noted to internal layers of dressing. VASCULAR: DP and PT pulses nonpalpable 0/4. Upon hand-held Doppler examination, DP pulse noted to be monophasic, PT pulse noted to be faintly monophasic. CFT is less than 3 seconds to the distal stump. Skin temperature is warm to cool from proximal to distal with no focal calor noted. No edema noted. No pain with calf compression.     NEUROLOGIC: Gross and epicritic sensation is diminished.  Protective sensation is diminished at all pedal sites.     DERMATOLOGIC: Dehiscence of transmetatarsal amputation incision noted with necrotic tissue to the distal stump plantarly. Wound base is a combination of granular and fibrotic tissue. Metatarsals 1, 2, 3 are visibly exposed. Liquefactive drainage noted to the medial aspect of the wound with mild malodor. No fluctuance or crepitus noted. Wound is not tunnel, track. There is no surrounding erythema. Dermatological changes noted to the left lower extremity consistent with venous stasis dermatitis. Diffuse xerosis noted. No other open wounds noted. MUSCULOSKELETAL: Muscle strength is 5/5 for all pedal groups tested. No pain with palpation of the foot or ankle. History of BKA to RLE, TMA LLE. IMAGING:  Narrative   XR FOOT LEFT (2 VIEWS)       Indication: assess for underlying gas formation at recent amputation site        COMPARISON: None       Findings: AP and lateral views of the left foot were obtained. Transmetatarsal amputation of all digits is noted. There is overlying packing material at the surgical site.       Vascular calcification is noted. There is no comparison study to immediate postoperative radiographs or presurgical appearance of the foot. No discrete soft tissue gas.       If concerned for ongoing infection, MRI without and with contrast is recommend along with clinical correlation with lab parameters.            Impression   Impression: Limited study, as above. No comparisons are available. ASSESSMENT/PLAN  1. Dehiscence of wound; LLE  2. Peripheral arterial disease  3. History of BKA; RLE  4. History of TMA; LLE  5.  Type 1 DM    -Patient examined and evaluated at bedside   -Hypertensive otherwise VSS, no leukocytosis noted (WBC 6.1)  -CRP 30.6, ESR 99  -HbA1c 9.1%, prolonged discussion with patient regarding the need for better glucose control to aid in healing.   -Prealbumin 16.3; nutritional dietary supplementation ordered  -Imaging reviewed, impression noted above  -Wound culture obtained; will f/u  -Dressing applied to left LE consisting of betadine soaked gauze, dry sterile dressing, tetranet  -ID following, continue antibiotics per their recommendations- currently on Doxycycline  -Nonweightbearing to left lower extremity  -Peripheral intervention with Dr. Amina Fulton today @ 1500. Will follow up with Dr. Amina Fulton following procedure.   -Plan for OR tomorrow at 1200 pending peripheral intervention outcome today with Dr. Aimna Fulton.   -Chest XR, EKG, COVID-19, PT/INR ordered for pre-op evaluation  -Will need surgical clearance from medicine team, much appreciated  -NPO after midnight. Will discuss with Dr. Amina Fulton anticoagulation management. Consent to be obtained    DISPO: Dehiscence of TMA wound with peripheral arterial disease of left lower extremity. Will undergo vascular intervention today, will follow up with Dr. Amina Fulton to determine would healing potential. Plan for revision of TMA, delayed primary closure, with possible JONATHAN, skin graft substitute and wound vac application. NPO at midnight. Consent to be obtained. Patient examined and evaluated at bedside with Dr. Catrachita Dumont DPM.    Nandini Snider DPM  Podiatric Resident, PGY-1  Pager #: [phone] or Perfect Serve     Patient was seen and evaluated at bedside. Agree with residents assessment and treatment plan.   Catrachita Dumont DPM

## 2021-03-03 NOTE — PROGRESS NOTES
Occupational Therapy   Occupational Therapy Initial Assessment, Tx, D/C  Date: 3/3/2021   Patient Name: Rico Antonio  MRN: [id number]     : 1960    Date of Service: 3/3/2021    Discharge Recommendations: Rico Antonio scored a 18/24 on the AM-PAC ADL Inpatient form. Current research shows that an AM-PAC score of 18 or greater is typically associated with a discharge to the patient's home setting. Based on the patient's AM-PAC score, and their current ADL deficits, it is recommended that the patient have 2-3 sessions per week of Occupational Therapy at d/c to increase the patient's independence. At this time, this patient demonstrates the endurance and safety to discharge home with (home vs OP services) and a follow up treatment frequency of 2-3x/wk. Please see assessment section for further patient specific details. If patient discharges prior to next session this note will serve as a discharge summary. Please see below for the latest assessment towards goals. OT Equipment Recommendations  Equipment Needed: Yes  Mobility Devices: ADL Assistive Devices  ADL Assistive Devices: Toilet Hygiene Aid    Assessment   Performance deficits / Impairments: Decreased functional mobility ; Decreased ADL status  Assessment: Pt functioning close to baseline status this date. Pt NWB LLE. RLE BKA. Pt uses w/c for mobility and has a RLE prosthesis at home. Pt reportining IND with mobility, transfers, ADL, assist with IADL from roomate. Pt able to complete lateral transfer this date from EOB to chair with SBA. Anticipate pt will have improved mobility with NWB status lifted and access to prosthesis. WFL ROM and strength in BUE. Pt currently at baseline and no further OT needs at this time. D/C acute care OT. Rec home with 24hour and HHOT.   Prognosis: Good  Decision Making: Medium Complexity  OT Education: Plan of Care;OT Role;Transfer Training  Patient Education: education on toileting aid for pericare pt concerns, verb understanding  REQUIRES OT FOLLOW UP: No  Safety Devices  Safety Devices in place: Yes  Type of devices: All fall risk precautions in place;Gait belt;Patient at risk for falls;Call light within reach; Chair alarm in place; Left in chair;Nurse notified           Patient Diagnosis(es): The primary encounter diagnosis was Dehiscence of operative wound, initial encounter. A diagnosis of Hx of diabetic neuropathy was also pertinent to this visit. has a past medical history of CAD (coronary artery disease), Cerebral artery occlusion with cerebral infarction ([facility] [address] and Diabetes mellitus ([facility] [address] has a past surgical history that includes Coronary artery bypass graft and Leg amputation below knee (Right). Restrictions  Position Activity Restriction  Other position/activity restrictions: up as tolerated, NWB LLE    Subjective   General  Chart Reviewed: Yes  Patient assessed for rehabilitation services?: Yes  Additional Pertinent Hx: 74-year-old gentleman with a significant history of diabetes mellitus type 1, PAD, CAD, right BKA, left foot transmetatarsal amputation 5 weeks ago at  sutures were removed 3 weeks ago came into ER with a concern for wound dehiscence  Referring Practitioner: Lyubov Navarrete MD  Diagnosis: wound infection  Patient Currently in Pain: Yes  Pain Assessment  Pain Level:  1(To L medial foot)  Vital Signs  Temp: 98.2 °F (36.8 °C)  Temp Source: Oral  Pulse: 101  Heart Rate Source: Monitor  Resp: 18  BP: (!) 151/71  BP Location: Left upper arm  Patient Position: Sitting  Patient Currently in Pain: Yes  Oxygen Therapy  SpO2: 93 %  Pulse Oximeter Device Mode: Intermittent  Pulse Oximeter Device Location: Finger  Social/Functional History  Social/Functional History  Lives With: Other (comment)(newly devorced, unable to stay with family, living in nursing home- left d/t poor care, left and currently staying with roomate)  Type of Home: Trailer  Home Layout: One level  Home Access: Ramped entrance  Bathroom Shower/Tub: Tub/Shower unit  Bathroom Toilet: Standard  Bathroom Equipment: Tub transfer bench  Home Equipment: Wheelchair-manual, Reacher  ADL Assistance: Independent  Homemaking Assistance: Needs assistance(groceries delivered, completes cooking, occassional cleaning, pt folds laundry but cannot get into laundry room)  Homemaking Responsibilities: Yes(share with roomate)  Ambulation Assistance: Independent(with w/c)  Transfer Assistance: Independent  Active : No  Patient's  Info: roommate  Additional Comments: reporting a fall 2-3 months ago, has RLE prosthetic leg       Objective        Orientation  Overall Orientation Status: Within Functional Limits     Balance  Sitting Balance: Supervision  Standing Balance  Comment: NWB LLE no standing this date, lateral transfer to chair, has RLE prosthesis not present in hospital  Functional Mobility  Functional - Mobility Device: No device  ADL  LE Dressing: (donned sleeve on RLE BKA)        Bed mobility  Supine to Sit: Modified independent  Scooting: Modified independent  Transfers  Sit Pivot Transfers: Stand by assistance  Transfer Comments: lateral transfer to chair from EOB, SBA     Cognition  Overall Cognitive Status: WFL                 LUE AROM (degrees)  LUE AROM : WFL  Left Hand AROM (degrees)  Left Hand AROM: WFL  RUE AROM (degrees)  RUE AROM : [facility]  Right Hand AROM (degrees)  Right Hand AROM: [facility]                     AM-PAC Score        AM-[facility] Inpatient Daily Activity Raw Score: 18 (03/03/21 1256)  AM-PAC Inpatient ADL T-Scale Score : 38.66 (03/03/21 1256)  ADL Inpatient CMS 0-100% Score: 46.65 (03/03/21 1256)  ADL Inpatient CMS G-Code Modifier : CK (03/03/21 1256)      Therapy Time   Individual Concurrent Group Co-treatment   Time In 1133         Time Out 1215         Minutes 42              Timed Code Treatment Minutes:   30    Total Treatment Minutes:  [address], [address], MOT, OTR/L

## 2021-03-03 NOTE — PROGRESS NOTES
Physical Therapy    Facility/Department: [address]  Initial Assessment/Treatment/Discharge Summary     Nadeem Stack  : 1960  MRN: [id number]    Date of Service: 3/3/2021    Discharge Recommendations:    Shea Goff scored a 14/24 on the AM-PAC short mobility form. Current research shows that an AM-PAC score of 18 or greater is typically associated with a discharge to the patient's home setting. Based on the patient's AM-PAC score and their current functional mobility deficits, it is recommended that the patient have 2-3 sessions per week of Physical Therapy at d/c to increase the patient's independence. At this time, this patient demonstrates the endurance and safety to discharge home with 24hr A and HHPT (home vs OP services) and a follow up treatment frequency of 2-3x/wk. Please see assessment section for further patient specific details. PT Equipment Recommendations  Equipment Needed: No    Assessment   Assessment: Pt currently mod I for bed mobility and SBA for lateral transfers. Pt with good ability to maintain NWB on LLE. Despite low AMPAC score pt is near his functional baseline. Pt planning to d/c home with 24hr A and verbs no safety concerns. Pt reports even better mobility with use of RLE prosthesis which he does not have in hospital. Pt with no further acute PT needs at this time. Will sign off from PT services. Prognosis: Good  Decision Making: Low Complexity  Patient Education: role of PT, use of call light and d/c planning - pt verb understanding  Barriers to Learning: none  REQUIRES PT FOLLOW UP: No  Activity Tolerance  Activity Tolerance: Patient Tolerated treatment well       Patient Diagnosis(es): The primary encounter diagnosis was Dehiscence of operative wound, initial encounter. A diagnosis of Hx of diabetic neuropathy was also pertinent to this visit.      has a past medical history of CAD (coronary artery disease), Cerebral artery occlusion with cerebral infarction [facility]), and Diabetes mellitus ([facility] [address] has a past surgical history that includes Coronary artery bypass graft and Leg amputation below knee (Right). 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 Family / Caregiver Present: No  Referring Practitioner: Alvino Payton MD  Diagnosis: wound infection  Follows Commands: Within Functional Limits  Subjective  Subjective: Pt found supine in bed upon arrival, reporting 1/10 pain in L foot and agreeable to therapy.   Pain Screening  Patient Currently in Pain: Yes          Orientation  Orientation  Overall Orientation Status: Within Normal Limits  Social/Functional History  Social/Functional History  Lives With: Other (comment)(newly devorced, unable to stay with family, living in nursing home- left d/t poor care, left and currently staying with roomate)  Type of Home: Trailer  Home Layout: One level  Home Access: Ramped entrance  Bathroom Shower/Tub: Tub/Shower unit  Bathroom Toilet: Standard  Bathroom Equipment: Tub transfer bench  Home Equipment: [address], Reacher  ADL Assistance: Independent  Homemaking Assistance: Needs assistance(groceries delivered, completes cooking, occassional cleaning, pt folds laundry but cannot get into laundry room)  Homemaking Responsibilities: Yes(share with roomate)  Ambulation Assistance: Independent(with w/c)  Transfer Assistance: Independent  Active : No  Patient's  Info: roommate  Additional Comments: reporting a fall 2-3 months ago, has RLE prosthetic leg  Cognition        Objective  AROM RLE (degrees)  RLE AROM: WFL  AROM LLE (degrees)  LLE AROM : WFL  LLE General AROM: R BKA, WFL hip and knee  Strength RLE  Comment: >3+/5 per pt's ability to perform functional mobility  Strength LLE  Comment: >3+/5 at hip and knee        Bed mobility  Supine to Sit: Modified independent  Scooting: Modified independent  Transfers  Bed to Chair: Stand by assistance(via lateral transfer - maintaining NWB on LLE)  Comment: Pt reports he normally stand pivots on RLE prosthetic but doesnt have prosthesis in hospital        Balance  Posture: Good  Sitting - Static: Good  Sitting - Dynamic: Good        Plan   Plan  Times per week: d/c acute PT  Safety Devices  Type of devices: Gait belt, Nurse notified, Call light within reach, Chair alarm in place, Left in chair      AM-PAC Score  AM-PAC Inpatient Mobility Raw Score : 14 (03/03/21 1321)  AM-PAC Inpatient T-Scale Score : 38.1 (03/03/21 1321)  Mobility Inpatient CMS 0-100% Score: 61.29 (03/03/21 1321)  Mobility Inpatient CMS G-Code Modifier : CL (03/03/21 1321)        Therapy Time   Individual Concurrent Group Co-treatment   Time In 1130         Time Out 1212         Minutes 42           Timed Code Treatment Minutes:  27    Total Treatment Minutes:  [id number] Mateus Quiñones PT, DPT

## 2021-03-03 NOTE — CARE COORDINATION
CM continuing to follow for safe discharge planning. SW attempted to meet with patient this afternoon to discuss psychosocial and long term plan issues per collaboration with RN CM. Patient off the floor to surgery at this time.      Ina Barakat, MSW/LSW    [phone]

## 2021-03-03 NOTE — PROGRESS NOTES
Pt arrives from cath lab alert but drowsy. Right femoral site has some small oozing noted. Femoral pulse present as well as popliteal pulse. /72, , temp 98.7 and oxygen is 92% on room air. Pt lying supine and understands his bedrest restrictions. Fall precautions in place, will continue to monitor.

## 2021-03-03 NOTE — PROGRESS NOTES
Clinical Pharmacy Consult Note    Admit date: 3/1/2021    Subjective/Objective:  Pt is a 63 yom with PMHx of DM1, PAD, CAD, R BKA, and L foot transmetatarsal amputation earlier this year who is admitted with left TMA wound dehiscence and critical limb ischemia. Interval update:  Planning for LLE revascularization procedure this afternoon. Podiatry then planning for LLE TMA revision tomorrow. Pharmacy is consulted to dose Vancomycin per Dr. Santhosh Pierce    Current antibiotics:  Cefepime 2g IV q12h -- day #2  Vancomycin, pharmacy to dose -- day #2   1.5g IV q12h (3/2-current)    Recent Labs     03/02/21  0637 03/03/21  0642    137   K 3.9 3.8   CL 99 103   CO2 27 29   BUN 19 21*   CREATININE 1.1 1.2       Estimated Creatinine Clearance: 80 mL/min (based on SCr of 1.2 mg/dL). Recent Labs     03/02/21  0637 03/03/21  0642   WBC 7.4 6.1   HGB 12.1* 11.5*   HCT 37.1* 35.1*   MCV 85.4 84.7    229       Height:  5' 6\" (167.6 cm)  Weight: 273 lb 9.5 oz (124.1 kg)    Micro:  Blood: ordered  Wound:  ordered    Assessment/Plan:  1)  Left TMA dehiscence:  Cefepime + Vancomycin - day #2  · Cefepime -  Current dose remains appropriate based on indication and current renal function. Will continue to monitor and adjust as needed per [facility] Renal Dose Adjustment Policy. · Vancomycin - Pharmacy to dose  · Continue 1.5g IV q12h. · Trough is ordered with dose due 3/4 01:00. Desired trough 10-15mcg/mL. · Clinical condition will be monitored closely, and levels will be ordered as clinically indicated.     Please call with questions--  Thanks--  Lorin Skiff, PharmD, BCPS, BCGP  [id number] ([facility])   3/3/2021 9:59 AM

## 2021-03-03 NOTE — PLAN OF CARE
Problem: Falls - Risk of:  Goal: Will remain free from falls  Description: Will remain free from falls  3/3/2021 0149 by Sujatha Carl RN  Outcome: Ongoing  Note: Fall precautions in place. Pt states that he sleeps in a recliner at home so he wanted to sleep up in his chair through the night. Chair locked in lowest position. Chair alarm in use. Nonskid footwear in use. Pt educated on call light system and instructed to use call light for any needs. Pt calls out appropriately. Call light and personal belongings within reach. Pt currently resting comfortably in chair. Will continue to monitor. Problem: Skin Integrity:  Goal: Absence of new skin breakdown  Description: Absence of new skin breakdown  3/3/2021 0149 by Sujatha Carl RN  Outcome: Ongoing  Note: Pt has no new evidence of skin breakdown. Pt able to reposition himself as needed. Skin assessment performed and documented. Problem: Skin Integrity:  Goal: Will show no infection signs and symptoms  Description: Will show no infection signs and symptoms  3/3/2021 0149 by Sujatha Carl RN  Outcome: Ongoing  Note: Pt has remained afebrile throughout shift. WBC within normal range. Pt receiving intermittent IV antibiotics. No new s/s of infection noted at this time. Will continue to monitor.

## 2021-03-03 NOTE — PROGRESS NOTES
Podiatric Surgery Note  Stephenjohn Fish    Discussed surgical intervention with patient at bedside. All potential risks, benefits, and complications were discussed with the patient prior to the scheduling of surgery. No guarantees or promises were made to what the outcomes will be. The patient wished to proceed with surgery, and informed written consent was obtained, signed, and placed on the patient's chart.     Discussed with Dr. Sami Gonzalez DPM.    rBuce Harman DPM  Podiatric Resident, PGY-1  Pager #: [phone] or Perfect Serve

## 2021-03-03 NOTE — PRE SEDATION
Sedation Pre-Procedure Note    Patient Name: Jahaira Leos   YOB: 1960  Room/Bed: 6321/6321-01  Medical Record Number: [id number]  Date: 3/3/2021   Time: 4:55 PM       Indication:  CLI    Consent: I have discussed with the patient and/or the patient representative the indication, alternatives, and the possible risks and/or complications of the planned procedure and the anesthesia methods. The patient and/or patient representative appear to understand and agree to proceed. Vital Signs:   Vitals:    03/03/21 1218   BP: (!) 151/71   Pulse: 101   Resp: 18   Temp: 98.2 °F (36.8 °C)   SpO2: 93%       Past Medical History:   has a past medical history of CAD (coronary artery disease), Cerebral artery occlusion with cerebral infarction ([facility] [address] and Diabetes mellitus ([facility] [address] Past Surgical History:   has a past surgical history that includes Coronary artery bypass graft and Leg amputation below knee (Right).     Medications:   Scheduled Meds:    ipratropium-albuterol  1 ampule Inhalation Q4H WA    doxycycline monohydrate  100 mg Oral 2 times per day    sodium chloride flush  10 mL Intravenous 2 times per day    melatonin  5 mg Oral Nightly    aspirin  81 mg Oral Daily    cilostazol  100 mg Oral BID    cholestyramine light  4 g Oral Daily    DULoxetine  90 mg Oral Daily    gabapentin  300 mg Oral TID    insulin glargine  70 Units Subcutaneous BID    metoprolol succinate  12.5 mg Oral BID    losartan  25 mg Oral Daily    pantoprazole  40 mg Oral BID    pramipexole  0.5 mg Oral Nightly    atorvastatin  20 mg Oral Nightly    tamsulosin  0.8 mg Oral Daily    insulin lispro  0-6 Units Subcutaneous TID WC    insulin lispro  0-3 Units Subcutaneous Nightly    sodium chloride flush  10 mL Intravenous 2 times per day    enoxaparin  40 mg Subcutaneous Daily    insulin lispro (1 Unit Dial)  40 Units Subcutaneous TID AC     Continuous Infusions:    sodium chloride 50 mL/hr at 03/02/21 2012     PRN Meds: sodium chloride flush, promethazine **OR** ondansetron, polyethylene glycol, acetaminophen **OR** acetaminophen  Home Meds:   Prior to Admission medications    Medication Sig Start Date End Date Taking? Authorizing Provider   cilostazol (PLETAL) 100 MG tablet Take 100 mg by mouth 2 times daily   Yes Historical Provider, MD   colestipol (COLESTID) 1 g tablet Take 1 g by mouth 2 times daily   Yes Historical Provider, MD   DULoxetine (CYMBALTA) 30 MG extended release capsule Take 90 mg by mouth daily   Yes Historical Provider, MD   furosemide (LASIX) 40 MG tablet Take 40 mg by mouth 2 times daily   Yes Historical Provider, MD   gabapentin (NEURONTIN) 300 MG capsule Take 300 mg by mouth 3 times daily.     Yes Historical Provider, MD   losartan (COZAAR) 25 MG tablet Take 25 mg by mouth daily   Yes Historical Provider, MD   metoprolol succinate (TOPROL XL) 25 MG extended release tablet Take 12.5 mg by mouth 2 times daily    Yes Historical Provider, MD   naproxen (NAPROSYN) 500 MG tablet Take 500 mg by mouth 2 times daily (with meals)   Yes Historical Provider, MD   pantoprazole (PROTONIX) 40 MG tablet Take 40 mg by mouth 2 times daily   Yes Historical Provider, MD   potassium chloride (MICRO-K) 10 MEQ extended release capsule Take 10 mEq by mouth daily   Yes Historical Provider, MD   pramipexole (MIRAPEX) 0.5 MG tablet Take 0.5 mg by mouth nightly    Yes Historical Provider, MD   simvastatin (ZOCOR) 40 MG tablet Take 40 mg by mouth nightly   Yes Historical Provider, MD   tamsulosin (FLOMAX) 0.4 MG capsule Take 0.8 mg by mouth daily   Yes Historical Provider, MD   aspirin 81 MG EC tablet Take 81 mg by mouth daily   Yes Historical Provider, MD   Misc Natural Products (GLUCOSAMINE CHOND CMP ADVANCED PO) Take 1 tablet by mouth daily   Yes Historical Provider, MD   insulin glargine (BASAGLAR KWIKPEN) 100 UNIT/ML injection pen Inject 70 Units into the skin 2 times daily    Yes Historical Provider, MD   insulin lispro (HUMALOG) 100 UNIT/ML injection vial Inject 40 Units into the skin 3 times daily (before meals) + sliding scale   Yes Historical Provider, MD   melatonin 3 MG TABS tablet Take 3 mg by mouth daily   Yes Historical Provider, MD     Coumadin Use Last 7 Days:  no  Antiplatelet drug therapy use last 7 days: no  Other anticoagulant use last 7 days: no  Additional Medication Information:  n/a      Pre-Sedation Documentation and Exam:   I have personally completed a history, physical exam & review of systems for this patient (see notes).     Mallampati Airway Assessment:  Mallampati Class I - (soft palate, fauces, uvula & anterior/posterior tonsillar pillars are visible)    Prior History of Anesthesia Complications:   none    ASA Classification:  Class 3 - A patient with severe systemic disease that limits activity but is not incapacitating    Sedation/ Anesthesia Plan:   intravenous sedation    Medications Planned:   midazolam (Versed) intravenously    Patient is an appropriate candidate for plan of sedation: yes    Electronically signed by Lico North MD on 3/3/2021 at 4:55 PM

## 2021-03-04 ENCOUNTER — APPOINTMENT (OUTPATIENT)
Dept: GENERAL RADIOLOGY | Age: 61
DRG: 474 | End: 2021-03-04
Payer: MEDICARE

## 2021-03-04 ENCOUNTER — ANESTHESIA EVENT (OUTPATIENT)
Dept: OPERATING ROOM | Age: 61
DRG: 474 | End: 2021-03-04
Payer: MEDICARE

## 2021-03-04 ENCOUNTER — ANESTHESIA (OUTPATIENT)
Dept: OPERATING ROOM | Age: 61
DRG: 474 | End: 2021-03-04
Payer: MEDICARE

## 2021-03-04 VITALS — DIASTOLIC BLOOD PRESSURE: 66 MMHG | SYSTOLIC BLOOD PRESSURE: 151 MMHG | OXYGEN SATURATION: 96 %

## 2021-03-04 LAB
ANION GAP SERPL CALCULATED.3IONS-SCNC: 12 MMOL/L (ref 3–16)
BASOPHILS ABSOLUTE: 0.1 K/UL (ref 0–0.2)
BASOPHILS RELATIVE PERCENT: 0.9 %
BUN BLDV-MCNC: 25 MG/DL (ref 7–20)
CALCIUM SERPL-MCNC: 8.4 MG/DL (ref 8.3–10.6)
CHLORIDE BLD-SCNC: 102 MMOL/L (ref 99–110)
CO2: 23 MMOL/L (ref 21–32)
CREAT SERPL-MCNC: 1.3 MG/DL (ref 0.8–1.3)
EOSINOPHILS ABSOLUTE: 0.3 K/UL (ref 0–0.6)
EOSINOPHILS RELATIVE PERCENT: 4.1 %
GFR AFRICAN AMERICAN: >60
GFR NON-AFRICAN AMERICAN: 56
GLUCOSE BLD-MCNC: 135 MG/DL (ref 70–99)
GLUCOSE BLD-MCNC: 194 MG/DL (ref 70–99)
GLUCOSE BLD-MCNC: 205 MG/DL (ref 70–99)
GLUCOSE BLD-MCNC: 255 MG/DL (ref 70–99)
GLUCOSE BLD-MCNC: 259 MG/DL (ref 70–99)
GLUCOSE BLD-MCNC: 271 MG/DL (ref 70–99)
HCT VFR BLD CALC: 36 % (ref 40.5–52.5)
HEMOGLOBIN: 11.5 G/DL (ref 13.5–17.5)
LYMPHOCYTES ABSOLUTE: 1.2 K/UL (ref 1–5.1)
LYMPHOCYTES RELATIVE PERCENT: 15.3 %
MCH RBC QN AUTO: 27.8 PG (ref 26–34)
MCHC RBC AUTO-ENTMCNC: 31.9 G/DL (ref 31–36)
MCV RBC AUTO: 87 FL (ref 80–100)
MONOCYTES ABSOLUTE: 0.6 K/UL (ref 0–1.3)
MONOCYTES RELATIVE PERCENT: 7.2 %
NEUTROPHILS ABSOLUTE: 5.8 K/UL (ref 1.7–7.7)
NEUTROPHILS RELATIVE PERCENT: 72.5 %
PDW BLD-RTO: 15.6 % (ref 12.4–15.4)
PERFORMED ON: ABNORMAL
PLATELET # BLD: 251 K/UL (ref 135–450)
PMV BLD AUTO: 8.1 FL (ref 5–10.5)
POTASSIUM REFLEX MAGNESIUM: 4.2 MMOL/L (ref 3.5–5.1)
RBC # BLD: 4.13 M/UL (ref 4.2–5.9)
SODIUM BLD-SCNC: 137 MMOL/L (ref 136–145)
VANCOMYCIN TROUGH: 11 UG/ML (ref 10–20)
WBC # BLD: 8 K/UL (ref 4–11)

## 2021-03-04 PROCEDURE — 6370000000 HC RX 637 (ALT 250 FOR IP): Performed by: PODIATRIST

## 2021-03-04 PROCEDURE — 3700000000 HC ANESTHESIA ATTENDED CARE: Performed by: PODIATRIST

## 2021-03-04 PROCEDURE — 88311 DECALCIFY TISSUE: CPT

## 2021-03-04 PROCEDURE — 88307 TISSUE EXAM BY PATHOLOGIST: CPT

## 2021-03-04 PROCEDURE — 99231 SBSQ HOSP IP/OBS SF/LOW 25: CPT | Performed by: INTERNAL MEDICINE

## 2021-03-04 PROCEDURE — 2580000003 HC RX 258: Performed by: NURSE ANESTHETIST, CERTIFIED REGISTERED

## 2021-03-04 PROCEDURE — 94640 AIRWAY INHALATION TREATMENT: CPT

## 2021-03-04 PROCEDURE — 2700000000 HC OXYGEN THERAPY PER DAY

## 2021-03-04 PROCEDURE — 7100000000 HC PACU RECOVERY - FIRST 15 MIN: Performed by: PODIATRIST

## 2021-03-04 PROCEDURE — 2060000000 HC ICU INTERMEDIATE R&B

## 2021-03-04 PROCEDURE — 0L8P0ZZ DIVISION OF LEFT LOWER LEG TENDON, OPEN APPROACH: ICD-10-PCS | Performed by: PODIATRIST

## 2021-03-04 PROCEDURE — 7100000001 HC PACU RECOVERY - ADDTL 15 MIN: Performed by: PODIATRIST

## 2021-03-04 PROCEDURE — 2580000003 HC RX 258: Performed by: PODIATRIST

## 2021-03-04 PROCEDURE — 2500000003 HC RX 250 WO HCPCS: Performed by: PODIATRIST

## 2021-03-04 PROCEDURE — 0Y6N0Z9 DETACHMENT AT LEFT FOOT, PARTIAL 1ST RAY, OPEN APPROACH: ICD-10-PCS | Performed by: PODIATRIST

## 2021-03-04 PROCEDURE — 0Y6N0ZB DETACHMENT AT LEFT FOOT, PARTIAL 2ND RAY, OPEN APPROACH: ICD-10-PCS | Performed by: PODIATRIST

## 2021-03-04 PROCEDURE — 2580000003 HC RX 258: Performed by: INTERNAL MEDICINE

## 2021-03-04 PROCEDURE — 2500000003 HC RX 250 WO HCPCS: Performed by: NURSE ANESTHETIST, CERTIFIED REGISTERED

## 2021-03-04 PROCEDURE — 3600000004 HC SURGERY LEVEL 4 BASE: Performed by: PODIATRIST

## 2021-03-04 PROCEDURE — 0Y6N0ZC DETACHMENT AT LEFT FOOT, PARTIAL 3RD RAY, OPEN APPROACH: ICD-10-PCS | Performed by: PODIATRIST

## 2021-03-04 PROCEDURE — 0Y6N0ZF DETACHMENT AT LEFT FOOT, PARTIAL 5TH RAY, OPEN APPROACH: ICD-10-PCS | Performed by: PODIATRIST

## 2021-03-04 PROCEDURE — 6360000002 HC RX W HCPCS: Performed by: NURSE ANESTHETIST, CERTIFIED REGISTERED

## 2021-03-04 PROCEDURE — 73630 X-RAY EXAM OF FOOT: CPT

## 2021-03-04 PROCEDURE — 6370000000 HC RX 637 (ALT 250 FOR IP): Performed by: INTERNAL MEDICINE

## 2021-03-04 PROCEDURE — 80202 ASSAY OF VANCOMYCIN: CPT

## 2021-03-04 PROCEDURE — 3600000014 HC SURGERY LEVEL 4 ADDTL 15MIN: Performed by: PODIATRIST

## 2021-03-04 PROCEDURE — 0Y6N0ZD DETACHMENT AT LEFT FOOT, PARTIAL 4TH RAY, OPEN APPROACH: ICD-10-PCS | Performed by: PODIATRIST

## 2021-03-04 PROCEDURE — 2709999900 HC NON-CHARGEABLE SUPPLY: Performed by: PODIATRIST

## 2021-03-04 PROCEDURE — 6360000002 HC RX W HCPCS: Performed by: ANESTHESIOLOGY

## 2021-03-04 PROCEDURE — 94761 N-INVAS EAR/PLS OXIMETRY MLT: CPT

## 2021-03-04 PROCEDURE — 85025 COMPLETE CBC W/AUTO DIFF WBC: CPT

## 2021-03-04 PROCEDURE — 3700000001 HC ADD 15 MINUTES (ANESTHESIA): Performed by: PODIATRIST

## 2021-03-04 PROCEDURE — 80048 BASIC METABOLIC PNL TOTAL CA: CPT

## 2021-03-04 PROCEDURE — 94660 CPAP INITIATION&MGMT: CPT

## 2021-03-04 PROCEDURE — 94150 VITAL CAPACITY TEST: CPT

## 2021-03-04 PROCEDURE — 36415 COLL VENOUS BLD VENIPUNCTURE: CPT

## 2021-03-04 RX ORDER — MIDAZOLAM HYDROCHLORIDE 1 MG/ML
INJECTION INTRAMUSCULAR; INTRAVENOUS PRN
Status: DISCONTINUED | OUTPATIENT
Start: 2021-03-04 | End: 2021-03-04 | Stop reason: SDUPTHER

## 2021-03-04 RX ORDER — FUROSEMIDE 10 MG/ML
20 INJECTION INTRAMUSCULAR; INTRAVENOUS ONCE
Status: COMPLETED | OUTPATIENT
Start: 2021-03-04 | End: 2021-03-04

## 2021-03-04 RX ORDER — SODIUM CHLORIDE 9 MG/ML
INJECTION, SOLUTION INTRAVENOUS CONTINUOUS PRN
Status: DISCONTINUED | OUTPATIENT
Start: 2021-03-04 | End: 2021-03-04 | Stop reason: SDUPTHER

## 2021-03-04 RX ORDER — ONDANSETRON 2 MG/ML
INJECTION INTRAMUSCULAR; INTRAVENOUS PRN
Status: DISCONTINUED | OUTPATIENT
Start: 2021-03-04 | End: 2021-03-04 | Stop reason: SDUPTHER

## 2021-03-04 RX ORDER — LIDOCAINE HYDROCHLORIDE 10 MG/ML
INJECTION, SOLUTION INFILTRATION; PERINEURAL PRN
Status: DISCONTINUED | OUTPATIENT
Start: 2021-03-04 | End: 2021-03-04 | Stop reason: SDUPTHER

## 2021-03-04 RX ORDER — LIDOCAINE HYDROCHLORIDE 20 MG/ML
INJECTION, SOLUTION EPIDURAL; INFILTRATION; INTRACAUDAL; PERINEURAL PRN
Status: DISCONTINUED | OUTPATIENT
Start: 2021-03-04 | End: 2021-03-04 | Stop reason: ALTCHOICE

## 2021-03-04 RX ORDER — BUPIVACAINE HYDROCHLORIDE 5 MG/ML
INJECTION, SOLUTION EPIDURAL; INTRACAUDAL PRN
Status: DISCONTINUED | OUTPATIENT
Start: 2021-03-04 | End: 2021-03-04 | Stop reason: ALTCHOICE

## 2021-03-04 RX ORDER — CEFAZOLIN SODIUM 1 G/3ML
INJECTION, POWDER, FOR SOLUTION INTRAMUSCULAR; INTRAVENOUS PRN
Status: DISCONTINUED | OUTPATIENT
Start: 2021-03-04 | End: 2021-03-04 | Stop reason: SDUPTHER

## 2021-03-04 RX ORDER — MAGNESIUM HYDROXIDE 1200 MG/15ML
LIQUID ORAL CONTINUOUS PRN
Status: COMPLETED | OUTPATIENT
Start: 2021-03-04 | End: 2021-03-04

## 2021-03-04 RX ORDER — FENTANYL CITRATE 50 UG/ML
INJECTION, SOLUTION INTRAMUSCULAR; INTRAVENOUS PRN
Status: DISCONTINUED | OUTPATIENT
Start: 2021-03-04 | End: 2021-03-04 | Stop reason: SDUPTHER

## 2021-03-04 RX ORDER — PROPOFOL 10 MG/ML
INJECTION, EMULSION INTRAVENOUS PRN
Status: DISCONTINUED | OUTPATIENT
Start: 2021-03-04 | End: 2021-03-04 | Stop reason: SDUPTHER

## 2021-03-04 RX ADMIN — CEFAZOLIN SODIUM 3000 MG: 1 POWDER, FOR SOLUTION INTRAMUSCULAR; INTRAVENOUS at 12:01

## 2021-03-04 RX ADMIN — METOPROLOL SUCCINATE 12.5 MG: 25 TABLET, EXTENDED RELEASE ORAL at 21:06

## 2021-03-04 RX ADMIN — METOPROLOL SUCCINATE 12.5 MG: 25 TABLET, EXTENDED RELEASE ORAL at 09:19

## 2021-03-04 RX ADMIN — Medication 5 MG: at 21:06

## 2021-03-04 RX ADMIN — PRAMIPEXOLE DIHYDROCHLORIDE 0.5 MG: 0.25 TABLET ORAL at 21:06

## 2021-03-04 RX ADMIN — TAMSULOSIN HYDROCHLORIDE 0.8 MG: 0.4 CAPSULE ORAL at 09:19

## 2021-03-04 RX ADMIN — PHENYLEPHRINE HYDROCHLORIDE 100 MCG: 10 INJECTION, SOLUTION INTRAMUSCULAR; INTRAVENOUS; SUBCUTANEOUS at 12:23

## 2021-03-04 RX ADMIN — CHOLESTYRAMINE 4 G: 4 POWDER, FOR SUSPENSION ORAL at 09:19

## 2021-03-04 RX ADMIN — ACETAMINOPHEN 650 MG: 325 TABLET ORAL at 23:53

## 2021-03-04 RX ADMIN — CILOSTAZOL 100 MG: 50 TABLET ORAL at 09:19

## 2021-03-04 RX ADMIN — LIDOCAINE HYDROCHLORIDE 30 MG: 10 INJECTION, SOLUTION INFILTRATION; PERINEURAL at 11:46

## 2021-03-04 RX ADMIN — IPRATROPIUM BROMIDE AND ALBUTEROL SULFATE 1 AMPULE: .5; 3 SOLUTION RESPIRATORY (INHALATION) at 15:28

## 2021-03-04 RX ADMIN — CILOSTAZOL 100 MG: 50 TABLET ORAL at 21:06

## 2021-03-04 RX ADMIN — INSULIN GLARGINE 70 UNITS: 100 INJECTION, SOLUTION SUBCUTANEOUS at 21:07

## 2021-03-04 RX ADMIN — PANTOPRAZOLE SODIUM 40 MG: 40 TABLET, DELAYED RELEASE ORAL at 21:07

## 2021-03-04 RX ADMIN — ATORVASTATIN CALCIUM 20 MG: 20 TABLET, FILM COATED ORAL at 21:06

## 2021-03-04 RX ADMIN — ONDANSETRON 4 MG: 2 INJECTION INTRAMUSCULAR; INTRAVENOUS at 12:08

## 2021-03-04 RX ADMIN — PANTOPRAZOLE SODIUM 40 MG: 40 TABLET, DELAYED RELEASE ORAL at 09:19

## 2021-03-04 RX ADMIN — FENTANYL CITRATE 100 MCG: 50 INJECTION, SOLUTION INTRAMUSCULAR; INTRAVENOUS at 12:06

## 2021-03-04 RX ADMIN — MIDAZOLAM HYDROCHLORIDE 2 MG: 2 INJECTION, SOLUTION INTRAMUSCULAR; INTRAVENOUS at 11:46

## 2021-03-04 RX ADMIN — GABAPENTIN 300 MG: 300 CAPSULE ORAL at 09:24

## 2021-03-04 RX ADMIN — DOXYCYCLINE 100 MG: 50 CAPSULE ORAL at 09:19

## 2021-03-04 RX ADMIN — Medication 10 ML: at 21:07

## 2021-03-04 RX ADMIN — ONDANSETRON 4 MG: 2 INJECTION INTRAMUSCULAR; INTRAVENOUS at 11:46

## 2021-03-04 RX ADMIN — INSULIN LISPRO 2 UNITS: 100 INJECTION, SOLUTION INTRAVENOUS; SUBCUTANEOUS at 21:08

## 2021-03-04 RX ADMIN — PROPOFOL 100 MG: 10 INJECTION, EMULSION INTRAVENOUS at 11:46

## 2021-03-04 RX ADMIN — LOSARTAN POTASSIUM 25 MG: 25 TABLET, FILM COATED ORAL at 09:19

## 2021-03-04 RX ADMIN — GABAPENTIN 300 MG: 300 CAPSULE ORAL at 21:06

## 2021-03-04 RX ADMIN — FUROSEMIDE 20 MG: 10 INJECTION, SOLUTION INTRAVENOUS at 16:10

## 2021-03-04 RX ADMIN — SODIUM CHLORIDE: 9 INJECTION, SOLUTION INTRAVENOUS at 11:51

## 2021-03-04 RX ADMIN — ASPIRIN 81 MG: 81 TABLET, FILM COATED ORAL at 09:19

## 2021-03-04 RX ADMIN — DULOXETINE HYDROCHLORIDE 90 MG: 60 CAPSULE, DELAYED RELEASE ORAL at 09:19

## 2021-03-04 RX ADMIN — DOXYCYCLINE 100 MG: 50 CAPSULE ORAL at 21:06

## 2021-03-04 RX ADMIN — SODIUM CHLORIDE: 9 INJECTION, SOLUTION INTRAVENOUS at 12:11

## 2021-03-04 ASSESSMENT — PULMONARY FUNCTION TESTS
PIF_VALUE: 17
PIF_VALUE: 1
PIF_VALUE: 17
PIF_VALUE: 16
PIF_VALUE: 17
PIF_VALUE: 1
PIF_VALUE: 17
PIF_VALUE: 16
PIF_VALUE: 17
PIF_VALUE: 42
PIF_VALUE: 7
PIF_VALUE: 17
PIF_VALUE: 16
PIF_VALUE: 1
PIF_VALUE: 17
PIF_VALUE: 17
PIF_VALUE: 2
PIF_VALUE: 16
PIF_VALUE: 17
PIF_VALUE: 17
PIF_VALUE: 1
PIF_VALUE: 23
PIF_VALUE: 17
PIF_VALUE: 17
PIF_VALUE: 1
PIF_VALUE: 17
PIF_VALUE: 1
PIF_VALUE: 17
PIF_VALUE: 29
PIF_VALUE: 17
PIF_VALUE: 2
PIF_VALUE: 17
PIF_VALUE: 16
PIF_VALUE: 27
PIF_VALUE: 17
PIF_VALUE: 1
PIF_VALUE: 8
PIF_VALUE: 7
PIF_VALUE: 17
PIF_VALUE: 3
PIF_VALUE: 18
PIF_VALUE: 17
PIF_VALUE: 17
PIF_VALUE: 3
PIF_VALUE: 52
PIF_VALUE: 6
PIF_VALUE: 17
PIF_VALUE: 16
PIF_VALUE: 3
PIF_VALUE: 3
PIF_VALUE: 16
PIF_VALUE: 3
PIF_VALUE: 16
PIF_VALUE: 17
PIF_VALUE: 6
PIF_VALUE: 17
PIF_VALUE: 2
PIF_VALUE: 16
PIF_VALUE: 17
PIF_VALUE: 16
PIF_VALUE: 17
PIF_VALUE: 19

## 2021-03-04 ASSESSMENT — PAIN DESCRIPTION - PAIN TYPE
TYPE: ACUTE PAIN;SURGICAL PAIN

## 2021-03-04 ASSESSMENT — PAIN SCALES - GENERAL
PAINLEVEL_OUTOF10: 0
PAINLEVEL_OUTOF10: 5

## 2021-03-04 ASSESSMENT — PAIN DESCRIPTION - LOCATION
LOCATION: FOOT

## 2021-03-04 ASSESSMENT — LIFESTYLE VARIABLES: SMOKING_STATUS: 0

## 2021-03-04 ASSESSMENT — PAIN DESCRIPTION - ORIENTATION
ORIENTATION: LEFT

## 2021-03-04 ASSESSMENT — ENCOUNTER SYMPTOMS: DYSPNEA ACTIVITY LEVEL: AFTER AMBULATING 1 FLIGHT OF STAIRS

## 2021-03-04 NOTE — PROGRESS NOTES
Patient alert and oriented times four. Patient vss this shift. Patient on Telemetry in NSR. Patient iv is in left hand. Patient on bedrest. Patient using uses the urinal. patient has no complaints. Patient adbominal sounds active. Patient has surgical site to left foot and surgical site to right leg. Skin otherwise is clean dry and intact. Patient breath sounds clear. Patient bed locked in lowest position with bed alarm on. Call light bedside table and belongings in reach. Patient encouraged to call with any and all needs. Patient verbalizes understanding and call appropriately. Will continue to monitor.

## 2021-03-04 NOTE — PROGRESS NOTES
PACU Transfer to Floor Note    Current Allergies: Patient has no known allergies. Pt meets criteria as per Maciel Score and ASPAN Standards to transfer to next phase of care. Recent Labs     03/04/21  1104 03/04/21  1428   POCGLU 135* 194*       Vitals:    03/04/21 1615   BP: (!) 133/53   Pulse: 100   Resp: 19   Temp: 98.6 °F (37 °C)   SpO2: 100%     Vitals within 20% of pt's admission vitals as per MACIEL SCORE    SpO2: 100 %    O2 Flow Rate (L/min): 2 L/min      Intake/Output Summary (Last 24 hours) at 3/4/2021 1634  Last data filed at 3/4/2021 1317  Gross per 24 hour   Intake 4224.6 ml   Output 1175 ml   Net 3049.6 ml       Pain assessment:  None to left foot surgical site    Pain Level: 0    Patient was assessed for alterations to skin integrity. There were not alterations observed. Is patient incontinent: no    Handoff report given via phone call to Tarun Brennan RN  Family updated via phone call.       3/4/2021 4:34 PM

## 2021-03-04 NOTE — PROGRESS NOTES
Pt returned from PACU. Pt alert and oritented x4. Pt on 2L NC, oxygen saturation in upper 90%. Pt very congested. Coughing up thick red secretions. Pt repositioned to high fowlers. Pt's lungs with inspiratory wheezing and ronchi throughout.  Fall precautions in place still

## 2021-03-04 NOTE — PROGRESS NOTES
ID Follow-up NOTE  Medical Student note - reviewed and modified, see Attending addendum at bottom    CC:   Dehiscence of TMA amputation site  Antibiotics: Doxycycline    Admit Date: 3/1/2021  Hospital Day: 4    Subjective:     Patient seen this morning, resting comfortably in bed. No overnight events. Discussed the results/success of his balloon angio yesterday. Patient states his shoulders are feeling \"fine\" today. States he thinks it was the prolonged immobility that caused him pain yesterday. Patient with hx of gastroparesis, states today he has not had a bowel movement in 3 days. Denies any pain or discomfort. Patient expressed concern about his procedure today, mainly because he says he was awake during his cath procedure yesterday and heard the team. I advised discussing this concern with anesthesiologist prior to procedure; nurse sent message to podiatry to make them aware of this concern. Denies change in vision, SOB, CP, abdominal pain, or pain in lower extremities. Objective:     Patient Vitals for the past 8 hrs:   BP Temp Temp src Pulse Resp SpO2 Weight   03/04/21 0735 (!) 144/53 98.4 °F (36.9 °C) Oral 100 18 95 % --   03/04/21 0534 -- -- -- -- -- -- 274 lb 11.1 oz (124.6 kg)   03/04/21 0426 (!) 129/57 98.7 °F (37.1 °C) Oral 104 18 93 % --     I/O last 3 completed shifts: In: 1948 [I.V.:3225]  Out: 1175 [[facility]:3796]  I/O this shift:  In: 299.6 [P.O.:60; I.V.:239.6]  Out: -     EXAM:  GENERAL: No apparent distress. HEENT: Membranes moist, no oral lesion  NECK:  Supple, no lymphadenopathy  LUNGS: Clear b/l, no rales, no dullness  CARDIAC: RRR, no murmur appreciated  ABD:  + BS, soft / NT  EXT:  Right BKA well healed. Left LE / stump with dressing (images in Epic with transmetatarsal amputation dehisced with exposed bone). Purplish discoloration visible with evidence of venous stasis up to approx. 6 inches below the patella.   NEURO: No focal neurologic findings  PSYCH: Orientation, sensorium, mood normal  LINES:  Peripheral iv       Data Review:  Lab Results   Component Value Date    WBC 8.0 03/04/2021    HGB 11.5 (L) 03/04/2021    HCT 36.0 (L) 03/04/2021    MCV 87.0 03/04/2021     03/04/2021     Lab Results   Component Value Date    CREATININE 1.3 03/04/2021    BUN 25 (H) 03/04/2021     03/04/2021    K 4.2 03/04/2021     03/04/2021    CO2 23 03/04/2021       Hepatic Function Panel: No results found for: ALKPHOS, ALT, AST, PROT, BILITOT, BILIDIR, IBILI, LABALBU    MICRO:  3/3 COVID-19, rapid - not detected  3/3 Cx, Wound - 1+ G- rods present; WBCs present    IMAGING:  3/1 XR L Foot -   Vascular calcification is noted. There is no comparison study to immediate postoperative radiographs or presurgical appearance of the foot. No discrete soft tissue gas.       If concerned for ongoing infection, MRI without and with contrast is recommend.      3/2 IR Angiogram LEFT lower extremity -   1. abdominal aorta is patent with patent mesenteric's and renal vessels. 2. left common femoral artery profunda femoris is patent. 3.  Left superficial femoral arteries patent with patent left popliteal artery. 4.  Left anterior tibial artery distal 99% stenosis with subtotal occlusion of the distal left dorsalis pedis artery. 5.  Left peroneal artery occludes distally. 6.  Left posterior tibial artery is occluded at the ostium. 7.  Plantar artery is occluded with incomplete plantar loop.     3/3 XR Chest - Cardiomegaly; Sternotomy wires noted; Clear lungs    3/3 IR Angio LEFT lower extremity - results pending    Scheduled Meds:   doxycycline monohydrate  100 mg Oral 2 times per day    sodium chloride flush  10 mL Intravenous 2 times per day    melatonin  5 mg Oral Nightly    aspirin  81 mg Oral Daily    cilostazol  100 mg Oral BID    cholestyramine light  4 g Oral Daily    DULoxetine  90 mg Oral Daily    gabapentin  300 mg Oral TID    insulin glargine  70 Units Subcutaneous BID    metoprolol succinate  12.5 mg Oral BID    losartan  25 mg Oral Daily    pantoprazole  40 mg Oral BID    pramipexole  0.5 mg Oral Nightly    atorvastatin  20 mg Oral Nightly    tamsulosin  0.8 mg Oral Daily    insulin lispro  0-6 Units Subcutaneous TID     insulin lispro  0-3 Units Subcutaneous Nightly    sodium chloride flush  10 mL Intravenous 2 times per day    [Held by provider] enoxaparin  40 mg Subcutaneous Daily    insulin lispro (1 Unit Dial)  40 Units Subcutaneous TID AC       Continuous Infusions:   sodium chloride 75 mL/hr at 03/03/21 2048    sodium chloride 50 mL/hr at 03/02/21 2012       PRN Meds:  acetaminophen, ipratropium-albuterol, sodium chloride flush, promethazine **OR** ondansetron, polyethylene glycol, acetaminophen **OR** acetaminophen      Assessment:     Stephen Fish is a 63 y. o. male with a PMHx of T1DM, CABG, PAD, right-sided lower extremity amputation, recent left-sided TMA amputations of all 5 toes, presents to the ED with concerns of postoperative complications. ED with concerns of postoperative complications.     Left anterior tibial artery 99% stenosed; L peroneal and posterior tibial artery occluded; subtotal occlusion of the left distal dorsal pedis. 3/1 Glucose 358 on admission; No fever (97.5), no leukocytosis (7.8); CRP (38.6), ESR (99) elevated;  Prealbumin low (16.3)     3/2 Glucose (213); No fever, no leukocytosis (7.4)  Tachycardic with elevated RR lasting ~10 hours. No drainage noted - no wound cx obtained  No acute signs of infection    3/3 No fever (98.2); no leukocytosis (6.1); Glucose (139)    3/4 Still no fever or leukocytosis; no systemic signs of infection. Left foot post-surgical wound dehiscence w/ no signs of infection. Plan:     Cont.  doxycycline for gram positive coverage  S/p successful LEFT LE revascularization with Dr. Alvino Gibson today for TMA revision/closure    Discussed with MD Akbar Ferrari MS4    Addendum to Medical Student Progress note:  Pt seen,examined and evaluated. I have independently performed history, physical exam, lab and data review. I have determined assessment and plan as documented by student Marin Mckeon).    62 yo DM, neuropathy, PVD, CAD / CABG  Hx L LE PVD  Recent L TMA - 1/7/21      Pt had dehiscence of L TMA wound weeks / months ago (pt is vague historian)  No drainage, no pain. High BS     Admit 3/1 -   Seen by Podiatry, Cardiology  Reviewed images of stump wound.   Exposed MT 1/2/3 per Podiatry note  Started on vancomycin + cefepime  L LE cath 3/2 with L ant tib a stenosis, L peroneal / post tib a occlusion    3/3, pt had L LE angio with L ant tib and plantar a balloon angioplasty     IMP/  Mult co-morbidities including DM, PVD  L TMA dehiscence with exposed MT  PVD     REC/  Cont doxycycline for gram positive coverage   Revision and closure TMA wound per Podiatry - OR today     Further antibiotics / antibiotics at discharge depends on whether TMA is revised (remove exposed MTs)    Discussed with pt  Anastasia Wills MD

## 2021-03-04 NOTE — CARE COORDINATION
Patient returned from surgery late this afternoon. Patient having lots of congestion per nurse. Patient has no wound vac at this time. Patient has had 2 surgeries back to back. Patient most likely not able to go home. Awaiting PT/OT evals post surgery. Patient to possibly be discharged to a SNF, but patient does not want to go back to [facility]. The insurance network dictates where he can go to SNF. CM will give list to choose and precert needed. Otherwise , home with [address] [facility]. [address] [facility], [address] put on JONAH.  Electronically signed by Kurt Irwin RN on 3/4/2021 at 6:06 PM

## 2021-03-04 NOTE — PLAN OF CARE
Problem: Falls - Risk of:  Goal: Will remain free from falls  Description: Will remain free from falls  Outcome: Ongoing  Note: Patient at risk for falls. Patient resting quietly in bed. Side rails up x 2. Bed locked in lowest position. Bed alarm on. Bedside table and call light within reach. Patient instructed to call for assistance. Patient verbalized understanding. Will continue to monitor. Problem: Falls - Risk of:  Goal: Absence of physical injury  Description: Absence of physical injury  Outcome: Ongoing     Problem: Skin Integrity:  Goal: Will show no infection signs and symptoms  Description: Will show no infection signs and symptoms  Outcome: Ongoing     Problem: Skin Integrity:  Goal: Absence of new skin breakdown  Description: Absence of new skin breakdown  Outcome: Ongoing  Note: Patient has multiple areas of surgical sites. Will continue to monitor     Problem: Pain:  Goal: Pain level will decrease  Description: Pain level will decrease  Outcome: Ongoing  Note: Pt resting at this time. No complaints of pain. Encouraged patient to call out with any needs. Will continue to monitor.       Problem: Pain:  Goal: Control of acute pain  Description: Control of acute pain  Outcome: Ongoing     Problem: Pain:  Goal: Control of chronic pain  Description: Control of chronic pain  Outcome: Ongoing

## 2021-03-04 NOTE — PROGRESS NOTES
Hospitalist Progress Note      PCP: Sriram Wiseman DO    Date of Admission: 3/1/2021    Chief Complaint: Wound dehiscence    Hospital Course: 59-year-old gentleman with a significant history of diabetes mellitus type 1, PAD, CAD, right BKA, left foot transmetatarsal amputation 5 weeks ago at  sutures were removed 3 weeks ago came into ER with a concern for wound dehiscence     S/p peripheral diagnostic angiogram on 3/2 showed 99% stenosis of left anterior tibial artery with subtotal occlusion of the left distal dorsal pedis artery. S/p successful AT/anterior plantar artery angioplasty      Subjective: . Patient reported feeling fine today. Blood sugars elevated this morning he did not receive his scheduled insulin overnight. Patient denies fever, chills, chest pain, shortness of breath.       Medications:  Reviewed    Infusion Medications    sodium chloride      sodium chloride 75 mL/hr at 03/03/21 2048    sodium chloride 50 mL/hr at 03/02/21 2012     Scheduled Medications    doxycycline monohydrate  100 mg Oral 2 times per day    sodium chloride flush  10 mL Intravenous 2 times per day    melatonin  5 mg Oral Nightly    aspirin  81 mg Oral Daily    cilostazol  100 mg Oral BID    cholestyramine light  4 g Oral Daily    DULoxetine  90 mg Oral Daily    gabapentin  300 mg Oral TID    insulin glargine  70 Units Subcutaneous BID    metoprolol succinate  12.5 mg Oral BID    losartan  25 mg Oral Daily    pantoprazole  40 mg Oral BID    pramipexole  0.5 mg Oral Nightly    atorvastatin  20 mg Oral Nightly    tamsulosin  0.8 mg Oral Daily    insulin lispro  0-6 Units Subcutaneous TID WC    insulin lispro  0-3 Units Subcutaneous Nightly    sodium chloride flush  10 mL Intravenous 2 times per day    [Held by provider] enoxaparin  40 mg Subcutaneous Daily    insulin lispro (1 Unit Dial)  40 Units Subcutaneous TID AC     PRN Meds: sodium chloride, lidocaine PF, acetaminophen, ipratropium-albuterol, sodium chloride flush, promethazine **OR** ondansetron, polyethylene glycol, acetaminophen **OR** acetaminophen      Intake/Output Summary (Last 24 hours) at 3/4/2021 1253  Last data filed at 3/4/2021 1222  Gross per 24 hour   Intake 4124.6 ml   Output 1175 ml   Net 2949.6 ml       Physical Exam Performed:    BP (!) 144/53   Pulse 100   Temp 98.4 °F (36.9 °C) (Oral)   Resp 18   Ht 5' 6\" (1.676 m)   Wt 274 lb 11.1 oz (124.6 kg)   SpO2 95%   BMI 44.34 kg/m²     General appearance: NAD  HEENT: Pupils equal, round, and reactive to light. Conjunctivae/corneas clear. Neck: Supple, with full range of motion. No jugular venous distention. Trachea midline. Respiratory:  Normal respiratory effort. Clear to auscultation, bilaterally without Rales/Wheezes/Rhonchi. Cardiovascular: Regular rate and rhythm with normal S1/S2 without murmurs, rubs or gallops. Abdomen: Soft, non-tender, non-distended with normal bowel sounds. Musculoskeletal: Right AKA covered with dressing, left foot metatarsal amputation covered with dressing no obvious sign of discharge. Neurologic:  Neurovascularly intact without any focal sensory/motor deficits. Cranial nerves: II-XII intact, grossly non-focal.  Psychiatric: Alert and oriented, thought content appropriate, normal insight  Capillary Refill: Brisk,< 3 seconds   Peripheral Pulses: +2 palpable, equal bilaterally       Labs:   Recent Labs     03/02/21  0637 03/03/21  0642 03/04/21  0033   WBC 7.4 6.1 8.0   HGB 12.1* 11.5* 11.5*   HCT 37.1* 35.1* 36.0*    229 251     Recent Labs     03/02/21  0637 03/03/21  0642 03/04/21  0033    137 137   K 3.9 3.8 4.2   CL 99 103 102   CO2 27 29 23   BUN 19 21* 25*   CREATININE 1.1 1.2 1.3   CALCIUM 8.8 8.8 8.4     No results for input(s): AST, ALT, BILIDIR, BILITOT, ALKPHOS in the last 72 hours.   Recent Labs     03/03/21  0938   INR 1.01     No results for input(s): Luther Priest in the last 72 hours.    Urinalysis:    No results found for: Ceola Beery, BACTERIA, RBCUA, BLOODU, [address]    Radiology:  XR CHEST (2 VW)   Final Result      Cardiomegaly. Sternotomy wires are noted. Clear lungs. XR FOOT LEFT (2 VIEWS)   Final Result   Impression: Limited study, as above. No comparisons are available. IR ANGIOGRAM EXTREMITY LEFT    (Results Pending)   IR ANGIOGRAM EXTREMITY LEFT    (Results Pending)           Assessment/Plan:    Active Hospital Problems    Diagnosis Date Noted    Open wound of left foot [S91.302A] 03/02/2021    Wound dehiscence, surgical Luvenia Hurl 03/02/2021    Rupture of operation wound [T81.31XA]     Wound infection [T14. 8XXA, L08.9] 03/01/2021         #Left lower extremity wound dehiscence  #Critical limb ischemia   S/p left transmetatarsal amputation 5 weeks sutures were removed  3 weeks ago. Per pathology report clean margins were obtained. ID on board recs [email or web address]. S/p peripheral diagnostic angiogram on 3/2 showed 99% stenosis of left anterior tibial artery with subtotal occlusion of the left distal dorsal pedis artery. .  S/p successful AT/anterior plantar artery angioplasty on 3/3  asa 81 /plavix 75 mg /Xarelto 2.5mg po bid ; okay to start plavix/xarelto post op tomorrow  intervention today at 3 PM.  Continue aspirin, Pletal     #Diabetes mellitus type 1  con Lantus 70 units twice daily along with Humalog 40 units 3 times daily with meals and additional insulin sliding scale.     #CAD hx of CABG  Continue losartan, metoprolol hold Lasix continue statin    DVT Prophylaxis: lovenox  Diet: Diet NPO, After Midnight Exceptions are: Sips with Meds  Code Status: Full Code    PT/OT Eval Status: consult after surgery    Dispo - inpatient likely d/c in 2-3 days.     This chart was likely completed using voice recognition technology and may contain unintended grammatical , phraseology,and/or punctuation errors      Marjan Gaines MD

## 2021-03-04 NOTE — PROGRESS NOTES
Patient had angiogram on 3/3. Patient had no PCU orders placed. Messaged dr Alexandria Penaloza via perfect serve. Telephone order to place PCU orders. Charge nurse notified of change in status. Will continue to monitor.

## 2021-03-04 NOTE — PROGRESS NOTES
Pt to OR. Ask that his daughter Sena Ruiz be updated after the procedure.  RN relayed the massage to pre-op

## 2021-03-04 NOTE — PROGRESS NOTES
1430 Red oral airway removed by this RN in PACU 16. Pt requiring non-rebreather at 15L O2 and regular oral suctioning. Nasal suctioning also completed once. 1500 - Pt now able to clear own secretions, but not fully awake post-op. 1510 - Pt transitioned to high-flow nasal cannula at 4L O2.   Current O2 sat is 95%

## 2021-03-04 NOTE — BRIEF OP NOTE
Brief Postoperative Note      Patient: Emily Castro  YOB: 1960  MRN: [id number]    Date of Procedure: 3/4/2021    Pre-Op Diagnosis: WOUND DEHISCENCE LEFT FOOT    Post-Op Diagnosis: Same       Procedure(s):  TRANSMETATARSAL AMPUTATION WITH DELAYED PRIMARY CLOSURE, TENDOACHILLES LENGTHENING  . Left lower extremity    Surgeon(s):  Kim Riddle DPM    Assistant:  Resident: Marino Gonsalez DPM    Anesthesia: Monitored anesthesia care    Hemostasis: Electrocauterization and anatomic dissection    Injectables:  20cc of 2% lidocaine plain preoperatively  20cc of 0.5% marcaine plain postoperatively    Materials: Blue vessel loop drain  2-0 vicryl, 2-0 nylon, 3-0 nylon     Estimated Blood Loss (mL): 573QI    Complications: None    Specimens:   ID Type Source Tests Collected by Time Destination   A : A. LEFT FOOT  Tissue Tissue SURGICAL PATHOLOGY Kim Riddle DPM 3/4/2021 1228        Implants:  * No implants in log *      Drains:   Open Drain Left; Anterior Foot  (Active)       Findings: No purulent drainage encountered. Wound closed primarily with adequate bleeding noted. Blue vessel loop drain in place. DISPO: s/p revisional TMA with delayed primary closure and tendo achilles lengthening of left lower extremity. No further surgical intervention from podiatric standpoint. Will order laser perfusion study to assess if further vascular intervention is needed outpatient.      Electronically signed by Mraino Gonsalez DPM on 3/4/2021 at 2:11 PM

## 2021-03-04 NOTE — PROGRESS NOTES
Pt continues with congested, very frequent cough. Dr Atilio Sands (anesthesia) notified. New order for 20 mg IV lasix. Lasix administered. Will continue to monitor. Pt now on 2L O2 via high flow nasal cannula.

## 2021-03-04 NOTE — OP NOTE
Operative Note      Patient: Matteo Wu  YOB: 1960  MRN: [id number]    Date of Procedure: 3/4/2021    Pre-Op Diagnosis: WOUND DEHISCENCE LEFT FOOT    Post-Op Diagnosis: Same       Procedure(s):  TRANSMETATARSAL AMPUTATION WITH DELAYED PRIMARY CLOSURE, TENDOACHILLES LENGTHENING  . LEFT LOWER EXTREMITY    Surgeon(s):  Jose Elias Duran DPM    Assistant:   Resident: Lucille Germain DPM    Anesthesia: Monitored anesthesia care     Hemostasis: Electrocauterization and anatomic dissection     Injectables:  20cc of 2% lidocaine plain preoperatively  20cc of 0.5% marcaine plain postoperatively     Materials: Blue vessel loop drain  2-0 vicryl, 2-0 nylon, 3-0 nylon      Estimated Blood Loss (mL): 810LU    Complications: None    Specimens:   ID Type Source Tests Collected by Time Destination   A : A. LEFT FOOT  Tissue Tissue SURGICAL PATHOLOGY Jose Elias Duran DPM 3/4/2021 1228        Implants:  * No implants in log *      Drains:   Open Drain Left; Anterior Foot  (Active)       Findings: No purulent drainage encountered. Wound closed primarily with adequate bleeding noted. Blue vessel loop drain in place. INDICATIONS FOR PROCEDURE: This patient has signs and symptoms clinically  consistent with the above mentioned preoperative diagnosis. Having failed conservative treatment, it was determined that the patient would benefit from surgical intervention. All potential risks, benefits, and complications were discussed with the patient prior to the scheduling of surgery. All the patient's questions were answered and no guarantees were given. The patient wished to proceed with surgery, and informed written consent was obtained. DETAILS OF PROCEDURE: The patient was brought from the pre-operative area and placed on the operating table in the supine position. Following IV sedation, a local anesthetic block was then injected proximal to the incision site consisting of 20cc of 2% lidocaine plain.  The left lower extremity was then scrubbed, prepped, and draped in the usual sterile fashion. A time-out was performed. The patient, procedure, and operative site were confirmed. The following procedure was then performed. PROCEDURE #1: TENDO-ACHILLES LENGTHENING, LEFT LOWER EXTREMITY:  At this time, attention was directed to the patient left foot where a contracted Achilles tendon was noted. At this time, 3 sequential staggered stab incisions were made along the posterior aspect of the Achilles tendon to abilio-transect the tendon beginning 1 cm proximal to the insertion point. Once these incisions were made with a No. 15 blade, the foot was dorsiflexed and the tendon was able to slide upon itself into a lengthened position. The wounds were then irrigated with normal sterile saline. These wounds were then closed with 3-0 Nylon suture. PROCEDURE #2: REVISION OF TRANSMETATARSAL AMPUTATION WITH DELAYED PRIMARY CLOSURE; LEFT LOWER EXTREMITY:  Attention was directed towards the full-thickness surgical wound dehiscence of the left foot at the site of the prior transmetatarsal amputation. The dorsal and plantar tissue was noted to be necrotic, with the plantar tissue with further necrosis than the dorsal tissue. The wound was noted to have a fibrotic, nonviable soft tissue with exposed metatarsals 1-5. Next, a #15 blade was used to make a full-thickness incision excising out the necrotic tissue of the dorsal and plantar skin. Attention was then directed toward the fibrotic tissue of the wound base where using a dermal curette, the necrotic and nonviable tissue was excised. A key elevator was then used to reflect the soft tissue from metatarsals 1-5 to allow for adequate visualization. Next, using a sagittal saw and a #138 blade, the metatarsals 1-5 were resected with the proximal 1/3 remaining. Care was taken to maintain normal metatarsal parabola.   These portions of metatarsals were then sharply dissected free from soft tissue attachments and passed from the operative field. The resected metatarsals were sent to pathology for specimen. The distal aspect of the remaining metatarsals 1-5 were noted to be hard, and healthy in appearance. Next, the remaining extensor and flexor tendons were grasped and cut as far proximal as possible to help prevent the tissue from serving as a possible nidus for future infection. The dorsal and plantar flaps were debulked free of their nonviable tissue with care being taken to preserve as much dorsal and plantar subcutaneous tissue as possible. At this time, the dorsal and plantar flap was examined and noted to be adequate for closure. The surgical wound was then irrigated using copious amounts of normal saline. Again, the plantar flap was inspected and noted to be free of necrotic soft tissue. No purulent drainage was encountered throughout the entirety of the procedure. The soft tissue was healthy in appearance and the skin edges to the dorsal and plantar flap were noted to be bleeding adequately, with the dorsal flap bleeding more than the plantar flap. The plantar flap was then rotated dorsally. The subcutaneous tissues were re-approximated using 2-0 vicryl. Given the amount of bleeding, the decision was made to insert a blue vessel loop drain into the surgical site to prevent hematoma formation. The skin was then closed using 2-0 nylon and 3-0 nylon in a horizontal mattress and simple interrupted suture technique. At this time, a local anesthetic was injected about the incision sites consisting of 20 cc of 0.5% Marcaine plain, for the patient's postoperative comfort. A soft sterile dressing was applied consisting of Adaptic, dry sterile gauze, ABD, Kerlix, cast padding, Ace bandage. A prompt hyperemic response was noted to the dorsal tissue, a hyperemic response of less than 3 seconds was noted to the plantar tissues.     END OF PROCEDURE: The patient tolerated the procedure and anesthesia well. The patient left the OR and was transferred to the PACU with vital signs stable and vascular status intact to all aspects of the left lower extremity. Following short period of postoperative monitoring, the patient will be readmitted to medical floor for further medical management and treatment of their medical comorbid conditions.     DISPO: s/p revisional TMA with delayed primary closure and tendo achilles lengthening of left lower extremity. No further surgical intervention from podiatric standpoint. Will order laser perfusion study to be done as outpatient in 1-2 weeks to assess if further vascular intervention is needed outpatient. Dictated on behalf of Dr. Rocael Villafuerte.  Jenny Tilley DPM.     Electronically signed by John Coffey DPM on 3/4/2021 at 5:48 PM

## 2021-03-04 NOTE — PROGRESS NOTES
Patient to pacu 14 for pre op. Consent verified in chart. Verified patient has been NPO with sips with  Meds. Lungs clear. On room air at 93%. Verified who the doctor needs to call post op.

## 2021-03-04 NOTE — ANESTHESIA PRE PROCEDURE
Department of Anesthesiology  Preprocedure Note       Name:  Judi Mitchell   Age:  63 y.o.  :  1960                                          MRN:  [id number]         Date:  3/4/2021      Surgeon: Angélica Jackson):  Nory Metzger DPM    Procedure: Procedure(s):  REVISED TRANSMETATARSAL AMPUTATION WITH DELAYED PRIMARY CLOSURE, SKIN GRAFT SUBSTITUTE APPLICATION, POSSIBLE TENDOACHILLES LENGTHENING, POSSIBLE WOUND VAC APPLICATION LEFT LOWER EXTREMITY  . Medications prior to admission:   Prior to Admission medications    Medication Sig Start Date End Date Taking? Authorizing Provider   cilostazol (PLETAL) 100 MG tablet Take 100 mg by mouth 2 times daily   Yes Historical Provider, MD   colestipol (COLESTID) 1 g tablet Take 1 g by mouth 2 times daily   Yes Historical Provider, MD   DULoxetine (CYMBALTA) 30 MG extended release capsule Take 90 mg by mouth daily   Yes Historical Provider, MD   furosemide (LASIX) 40 MG tablet Take 40 mg by mouth 2 times daily   Yes Historical Provider, MD   gabapentin (NEURONTIN) 300 MG capsule Take 300 mg by mouth 3 times daily.     Yes Historical Provider, MD   losartan (COZAAR) 25 MG tablet Take 25 mg by mouth daily   Yes Historical Provider, MD   metoprolol succinate (TOPROL XL) 25 MG extended release tablet Take 12.5 mg by mouth 2 times daily    Yes Historical Provider, MD   naproxen (NAPROSYN) 500 MG tablet Take 500 mg by mouth 2 times daily (with meals)   Yes Historical Provider, MD   pantoprazole (PROTONIX) 40 MG tablet Take 40 mg by mouth 2 times daily   Yes Historical Provider, MD   potassium chloride (MICRO-K) 10 MEQ extended release capsule Take 10 mEq by mouth daily   Yes Historical Provider, MD   pramipexole (MIRAPEX) 0.5 MG tablet Take 0.5 mg by mouth nightly    Yes Historical Provider, MD   simvastatin (ZOCOR) 40 MG tablet Take 40 mg by mouth nightly   Yes Historical Provider, MD   tamsulosin (FLOMAX) 0.4 MG capsule Take 0.8 mg by mouth daily   Yes Historical Provider, MD aspirin 81 MG EC tablet Take 81 mg by mouth daily   Yes Historical Provider, MD   Misc Natural Products (GLUCOSAMINE CHOND CMP ADVANCED PO) Take 1 tablet by mouth daily   Yes Historical Provider, MD   insulin glargine (BASAGLAR KWIKPEN) 100 UNIT/ML injection pen Inject 70 Units into the skin 2 times daily    Yes Historical Provider, MD   insulin lispro (HUMALOG) 100 UNIT/ML injection vial Inject 40 Units into the skin 3 times daily (before meals) + sliding scale   Yes Historical Provider, MD   melatonin 3 MG TABS tablet Take 3 mg by mouth daily   Yes Historical Provider, MD       Current medications:    Current Facility-Administered Medications   Medication Dose Route Frequency Provider Last Rate Last Admin    0.9 % sodium chloride infusion   Intravenous Continuous Carlos Javier MD 75 mL/hr at 03/03/21 2048 New Bag at 03/03/21 2048    doxycycline monohydrate (MONODOX) capsule 100 mg  100 mg Oral 2 times per day Melissa Nielson MD   100 mg at 03/04/21 0919    acetaminophen (TYLENOL) tablet 650 mg  650 mg Oral Q4H PRN Carlos Javier MD        ipratropium-albuterol (DUONEB) nebulizer solution 1 ampule  1 ampule Inhalation Q4H PRN Ahsan Leone MD        0.9 % sodium chloride infusion   Intravenous Continuous Maynor Sims MD 50 mL/hr at 03/02/21 2012 New Bag at 03/02/21 2012    sodium chloride flush 0.9 % injection 10 mL  10 mL Intravenous 2 times per day Maynor Sims MD   10 mL at 03/03/21 2041    sodium chloride flush 0.9 % injection 10 mL  10 mL Intravenous PRN Maynor Sims MD        melatonin disintegrating tablet 5 mg  5 mg Oral Nightly Dwayne Montero MD   5 mg at 03/03/21 2041    aspirin EC tablet 81 mg  81 mg Oral Daily Melissa Nielson MD   81 mg at 03/04/21 0919    cilostazol (PLETAL) tablet 100 mg  100 mg Oral BID Melissa Nielson MD   100 mg at 03/04/21 0919    cholestyramine light packet 4 g  4 g Oral Daily Melissa Nielson MD   4 g at 03/04/21 0919    DULoxetine (CYMBALTA) extended release capsule 90 mg  90 mg Oral Daily Suze Nguyễn MD   90 mg at 03/04/21 0919    gabapentin (NEURONTIN) capsule 300 mg  300 mg Oral TID Suze Nguyễn MD   300 mg at 03/04/21 0924    insulin glargine (LANTUS;BASAGLAR) injection pen 70 Units  70 Units Subcutaneous BID Suze Nguyễn MD   70 Units at 03/03/21 2040    metoprolol succinate (TOPROL XL) extended release tablet 12.5 mg  12.5 mg Oral BID Suze Nguyễn MD   12.5 mg at 03/04/21 0919    losartan (COZAAR) tablet 25 mg  25 mg Oral Daily Suze Nguyễn MD   25 mg at 03/04/21 0919    pantoprazole (PROTONIX) tablet 40 mg  40 mg Oral BID Suze Nguyễn MD   40 mg at 03/04/21 0919    pramipexole (MIRAPEX) tablet 0.5 mg  0.5 mg Oral Nightly Suze Nguyễn MD   0.5 mg at 03/03/21 2041    atorvastatin (LIPITOR) tablet 20 mg  20 mg Oral Nightly Suze Nguyễn MD   20 mg at 03/03/21 2039    tamsulosin (FLOMAX) capsule 0.8 mg  0.8 mg Oral Daily Suze Nguyễn MD   0.8 mg at 03/04/21 0919    insulin lispro (1 Unit Dial) 0-6 Units  0-6 Units Subcutaneous TID WC Suze Nguyễn MD   2 Units at 03/03/21 1719    insulin lispro (1 Unit Dial) 0-3 Units  0-3 Units Subcutaneous Nightly Suze Nguyễn MD   1 Units at 03/03/21 2040    sodium chloride flush 0.9 % injection 10 mL  10 mL Intravenous 2 times per day Suze Nguyễn MD   10 mL at 03/03/21 2041    [Held by provider] enoxaparin (LOVENOX) injection 40 mg  40 mg Subcutaneous Daily Suze Nguyễn MD   Stopped at 03/03/21 0912    promethazine (PHENERGAN) tablet 12.5 mg  12.5 mg Oral Q6H PRN Suze Nguyễn MD        Or    ondansetron [facility]) injection 4 mg  4 mg Intravenous Q6H PRN Suze Nugyễn MD        polyethylene glycol (GLYCOLAX) packet 17 g  17 g Oral Daily PRN Suze Nguyễn MD        acetaminophen (TYLENOL) tablet 650 mg  650 mg Oral Q6H PRN Szue Nguyễn MD        Or    acetaminophen (TYLENOL) suppository 650 mg  650 mg Rectal Q6H PRN Suze Nguyễn MD        insulin lispro (1 Unit Dial) 40 Units  40 Units Subcutaneous TID AC Markie Gutierrez MD   Stopped at 03/03/21 1718       Allergies:  No Known Allergies    Problem List:    Patient Active Problem List   Diagnosis Code    Wound infection T14. 8XXA, L08.9    Critical lower limb ischemia I99.8    Open wound of left foot S91.302A    Wound dehiscence, surgical T81.31XA    Rupture of operation wound T81. 31XA       Past Medical History:        Diagnosis Date    CAD (coronary artery disease)     [facility]     Diabetes mellitus ([facility]        Past Surgical History:        Procedure Laterality Date    CORONARY ARTERY BYPASS GRAFT      LEG AMPUTATION BELOW KNEE Right        Social History:    Social History     Tobacco Use    Smoking status: Never Smoker    Smokeless tobacco: Never Used   Substance Use Topics    Alcohol use: Not Currently                                Counseling given: Not Answered      Vital Signs (Current):   Vitals:    03/04/21 0018 03/04/21 0426 03/04/21 0534 03/04/21 0735   BP: 99/67 (!) 129/57  (!) 144/53   Pulse: 108 104  100   Resp: 18 18  18   Temp: 99 °F (37.2 °C) 98.7 °F (37.1 °C)  98.4 °F (36.9 °C)   TempSrc: Oral Oral  Oral   SpO2: 94% 93%  95%   Weight:   274 lb 11.1 oz (124.6 kg)    Height:                                                  BP Readings from Last 3 Encounters:   03/04/21 (!) 144/53       NPO Status: Time of last liquid consumption: (sips with meds)                                                 Date of last liquid consumption: 03/04/21                             BMI:   Wt Readings from Last 3 Encounters:   03/04/21 274 lb 11.1 oz (124.6 kg)     Body mass index is 44.34 kg/m².     CBC:   Lab Results   Component Value Date    WBC 8.0 03/04/2021    RBC 4.13 03/04/2021    HGB 11.5 03/04/2021    HCT 36.0 03/04/2021    MCV 87.0 03/04/2021    RDW 15.6 03/04/2021     03/04/2021       CMP:   Lab Results   Component Value Date     03/04/2021    K 4.2 03/04/2021     03/04/2021    CO2 23 03/04/2021    BUN 25 03/04/2021    CREATININE 1.3 03/04/2021    GFRAA >60 03/04/2021    LABGLOM 56 03/04/2021    GLUCOSE 271 03/04/2021    CALCIUM 8.4 03/04/2021       POC Tests:   Recent Labs     03/04/21  1104   POCGLU 135*       Coags:   Lab Results   Component Value Date    PROTIME 11.7 03/03/2021    INR 1.01 03/03/2021       HCG (If Applicable): No results found for: PREGTESTUR, PREGSERUM, HCG, HCGQUANT     ABGs: No results found for: PHART, PO2ART, SFJ8VYD, NTS5OMV, BEART, J2DDMJPL     Type & Screen (If Applicable):  No results found for: LABABO, LABRH    Drug/Infectious Status (If Applicable):  No results found for: HIV, HEPCAB    COVID-19 Screening (If Applicable):   Lab Results   Component Value Date    COVID19 Not Detected 03/03/2021         Anesthesia Evaluation  Patient summary reviewed and Nursing notes reviewed history of anesthetic complications (pt recalls last surgery  conversations ):   Airway: Mallampati: II  TM distance: >3 FB     Mouth opening: > = 3 FB Dental: normal exam         Pulmonary:   (+) sleep apnea: on CPAP,  wheezes,      (-) not a current smoker (never)                           Cardiovascular:  Exercise tolerance: poor (<4 METS),   (+) CAD: no interval change, CABG/stent (3V CABG  2015   ):, PATTON: after ambulating 1 flight of stairs,     (-)  angina    NYHA Classification: III    Rhythm: regular  Rate: normal           Beta Blocker:  Dose within 24 Hrs         Neuro/Psych:               GI/Hepatic/Renal:   (+) morbid obesity     (-) GERD       Endo/Other:    (+) Diabetes ()Type II DM, well controlled, using insulin, . ROS comment: Hx osteomyelitis left leg      Had bk amputation  Abdominal:   (+) obese,         Vascular:                                        Anesthesia Plan      MAC     ASA 4       Induction: intravenous. MIPS: Postoperative opioids intended and Prophylactic antiemetics administered. Anesthetic plan and risks discussed with patient. Plan discussed with CRNA.     Attending anesthesiologist reviewed and agrees with Pre Eval content              Tuan Vega DO   3/4/2021

## 2021-03-04 NOTE — DISCHARGE INSTR - COC
Continuity of Care Form    Patient Name: Benson Ann   :  1960  MRN:  [id number]    Admit date:  3/1/2021  Discharge date:  3/11/21    Code Status Order: Full Code   Advance Directives:   [facility] Documentation       Date/Time Healthcare Directive Type of Healthcare Directive Copy in [address] Agent's Name Healthcare Agent's Phone Number    21 0084  -- Delivered HCPOA and Living Will paperwork today. -- -- -- -- --    21  No, patient does not have an advance directive for healthcare treatment -- -- -- -- --            Admitting Physician:  Rosalind Reza MD  PCP: Azul Cooper DO    Discharging Nurse: [facility] Unit/Room#: 6170/3610-77  Discharging Unit Phone Number: [phone]    Emergency Contact:   Extended Emergency Contact Information  Primary Emergency Contact: JAMIE Morel [address] Phone: [phone]  Relation: Other  Secondary Emergency Contact: annika crystal  Home Phone: [phone]  Relation: Child   needed? No    Past Surgical History:  Past Surgical History:   Procedure Laterality Date    CORONARY ARTERY BYPASS GRAFT      LEG AMPUTATION BELOW KNEE Right        Immunization History: There is no immunization history on file for this patient. Active Problems:  Patient Active Problem List   Diagnosis Code    Wound infection T14. 8XXA, L08.9    Critical lower limb ischemia I99.8    Open wound of left foot S91.302A    Wound dehiscence, surgical T81.31XA    Rupture of operation wound T81. 31XA       Isolation/Infection:   Isolation            No Isolation          Patient Infection Status       Infection Onset Added Last Indicated Last Indicated By Review Planned Expiration Resolved Resolved By    None active    Resolved    COVID-19 Rule Out 21 COVID-19, Rapid (Ordered)   21 Rule-Out Test Resulted            Nurse Assessment:  Last Vital Signs: /62   Pulse 99 Temp 97.7 °F (36.5 °C) (Oral)   Resp 20   Ht 5' 6\" (1.676 m)   Wt 274 lb 11.1 oz (124.6 kg)   SpO2 97%   BMI 44.34 kg/m²     Last documented pain score (0-10 scale): Pain Level: 0  Last Weight:   Wt Readings from Last 1 Encounters:   03/04/21 274 lb 11.1 oz (124.6 kg)     Mental Status:  oriented and alert    IV Access:  PICC LUE    Nursing Mobility/ADLs:  Walking   Assisted  Transfer  Assisted  Bathing  Assisted  Dressing  Assisted  Toileting  Assisted  Feeding  Independent  Med Admin  Independent  Med Delivery   whole    Wound Care Documentation and Therapy:        Elimination:  Continence:   · Bowel: Yes  · Bladder: Yes  Urinary Catheter: None   Colostomy/Ileostomy/Ileal Conduit: No       Date of Last BM: 3/11    Intake/Output Summary (Last 24 hours) at 3/4/2021 1714  Last data filed at 3/4/2021 1645  Gross per 24 hour   Intake 4404.6 ml   Output 1175 ml   Net 3229.6 ml     I/O last 3 completed shifts: In: 4224.6 [P.O.:60; I.V.:4164.6]  Out: 1175 [TJPJA:6082]    Safety Concerns: At Risk For Falls    Impairments/Disabilities:      BKA    Nutrition Therapy:  Current Nutrition Therapy:   - Oral Diet:  Carb Control 3 carbs/meal (1500kcals/day)    Routes of Feeding: Oral  Liquids: Thin Liquids  Daily Fluid Restriction: no  Last Modified Barium Swallow with Video (Video Swallowing Test): not done    Treatments at the Time of Hospital Discharge:   Respiratory Treatments: ***  Oxygen Therapy:  is not on home oxygen therapy.   Ventilator:    CPAP at Night    Rehab Therapies: Physical Therapy and Occupational Therapy  Weight Bearing Status/Restrictions: NWB to Left lower extremity   Other Medical Equipment (for information only, NOT a DME order):  ***  Other Treatments: ***    Patient's personal belongings (please select all that are sent with patient):  Cell Phone    RN SIGNATURE:  Electronically signed by Rosalba Sam RN on 3/8/21 at 9:46 AM EST    CASE MANAGEMENT/SOCIAL WORK SECTION    Inpatient Status Date: 3-1-21    Readmission Risk Assessment Score:  Readmission Risk              Risk of Unplanned Readmission:        11           Discharging to Facility/ Agency   [facility]. Balta Duque   [address] [place] [address] [address]       Phone: [phone]      Fax: [phone]          / signature: Electronically signed by RENETTA Marsh, MAURICIOW on 3/9/21 at 10:12 AM EST    PHYSICIAN SECTION    Prognosis: Fair    Condition at Discharge: Stable    Rehab Potential (if transferring to Rehab): N/A    Recommended Labs or Other Treatments After Discharge:   INFUSION ORDERS:  Invanz 1 gm iv daily through 3/31  - First dose given in hospital - 3/10  - L PICC   - Disposition / date discharge - [facility] [phone] / 3/11  - Check CBC w diff, CMP, ESR, CRP every Mon or Tue - FAX result to [phone]  - Call with antibiotic / infusion issues, [phone]  - If facility - call with any change in status, transfer out of facility or to hospital - [phone]  - No f/u in outpatient ID office necessary. F/u Podiatry, interventional Card after discharge    PODIATRY DISCHARGE INSTRUCTIONS:  -You are to be NON weight bearing to the left lower extremity  -Do not lift or move heavy objects  -Do not drive    Left lower extremity dressing change:  -Please apply Betadine to the stitches of the surgical incision.  -Please apply Adaptic gauze over the surgical incision.  -Please apply multiple dry sterile gauze starting from the stump site to just below the knee. -Please loosely wrap Kerlix starting from the stump site to just below the knee. -Please wrap 4 inch Ace bandage starting from the stump site to just above the ankle joint.  -Please wrap 6 inch Ace bandage starting from the ankle joint to just below the knee.   -Please change dressing and inspect surgical site daily.  -Keep left lower extremity elevated above the level of the hear when lying/sitting for extended periods of time.  -Take all prescribed medications as directed. -If you experience nausea, vomiting, fever, chills, shortness breath, chest pain, present to the emergency department.  -Please get Laser perfusion study done within 2 weeks of discharge and call Dr. Cristiano Kiran office for follow up. Follow up - Call Dr. Jodie Hogue office to schedule follow up appointment within 1 week of discharge. Physician Certification: I certify the above information and transfer of Shea Goff  is necessary for the continuing treatment of the diagnosis listed and that he requires [facility] for less 30 days.      Update Admission H&P: No change in H&P    PHYSICIAN SIGNATURE:  Electronically signed by Verenice Arellano MD on 3/8/21 at 9:42 AM EST

## 2021-03-04 NOTE — DISCHARGE INSTR - COC
Continuity of Care Form    Patient Name: Torito Chapman   :  1960  MRN:  [id number]    Admit date:  3/1/2021  Discharge date:  ***    Code Status Order: Full Code   Advance Directives:   Advance Care Flowsheet Documentation       Date/Time Healthcare Directive Type of Healthcare Directive Copy in [address] Agent's Name Healthcare Agent's Phone Number    21 1008  -- Delivered HCPOA and Living Will paperwork today. -- -- -- -- --    21  No, patient does not have an advance directive for healthcare treatment -- -- -- -- --            Admitting Physician:  Mak Quiles MD  PCP: Daniel Gonzales DO    Discharging Nurse: [facility] Unit/Room#: 3611/5065-43  Discharging Unit Phone Number: ***    Emergency Contact:   Extended Emergency Contact Information  Primary Emergency Contact: Janell Awad , [address] Phone: [phone]  Relation: Other  Secondary Emergency Contact: annika crystal  Home Phone: [phone]  Relation: Child   needed? No    Past Surgical History:  Past Surgical History:   Procedure Laterality Date    ACHILLES TENDON SURGERY Left 3/4/2021    . performed by Gisele Roberts DPM at [address] [place] [address] Left 3/4/2021    TRANSMETATARSAL AMPUTATION WITH DELAYED PRIMARY CLOSURE, TENDOACHILLES LENGTHENING performed by Gisele Roberts DPM at [facility] Right        Immunization History: There is no immunization history on file for this patient. Active Problems:  Patient Active Problem List   Diagnosis Code    Wound infection T14. 8XXA, L08.9    Critical lower limb ischemia I99.8    Open wound of left foot S91.302A    Wound dehiscence, surgical T81.31XA    Rupture of operation wound T81. 31XA       Isolation/Infection:   Isolation            No Isolation          Unreconciled Outside Infections       Enable clinical decision support by reconciling outside information with the patient's chart. .      Infection Onset Last Indicated Last Received Source    MRSA 12/15/17 12/18/17 02/15/21 [address]          Patient Infection Status       Infection Onset Added Last Indicated Last Indicated By Review Planned Expiration Resolved Resolved By    None active    Resolved    COVID-19 Rule Out 03/03/21 03/03/21 03/03/21 COVID-19, Rapid (Ordered)   03/03/21 Rule-Out Test Resulted            Nurse Assessment:  Last Vital Signs: /62   Pulse 99   Temp 97.7 °F (36.5 °C) (Oral)   Resp 20   Ht 5' 6\" (1.676 m)   Wt 274 lb 11.1 oz (124.6 kg)   SpO2 97%   BMI 44.34 kg/m²     Last documented pain score (0-10 scale): Pain Level: 0  Last Weight:   Wt Readings from Last 1 Encounters:   03/04/21 274 lb 11.1 oz (124.6 kg)     Mental Status:  {IP PT MENTAL STATUS:20030:::0}    IV Access:  { JONAH IV ACCESS:283184963:::0}    Nursing Mobility/ADLs:  Walking   {CHP DME ADLs:605082114:::0}  Transfer  {CHP DME ADLs:148131821:::0}  Bathing  {CHP DME ADLs:807610047:::0}  Dressing  {CHP DME ADLs:193870180:::0}  Toileting  {CHP DME ADLs:521365856:::0}  Feeding  {CHP DME ADLs:512089474:::0}  Med Admin  {P DME ADLs:424581338:::0}  Med Delivery   { JONAH MED Delivery:046733946:::0}    Wound Care Documentation and Therapy:        Elimination:  Continence: Bowel: {YES / EH:32760}  Bladder: {YES / GO:74178}  Urinary Catheter: {Urinary Catheter:781174922:::0}   Colostomy/Ileostomy/Ileal Conduit: {YES / NR:19993}       Date of Last BM: ***    Intake/Output Summary (Last 24 hours) at 3/4/2021 1716  Last data filed at 3/4/2021 1645  Gross per 24 hour   Intake 4404.6 ml   Output 1175 ml   Net 3229.6 ml     I/O last 3 completed shifts:   In: 4224.6 [P.O.:60; I.V.:4164.6]  Out: 1175 [Urine:1175]    Safety Concerns:     [address] Safety Concerns:723061174:::0}    Impairments/Disabilities:      [address] JONAH Impairments/Disabilities:056596156:::0}    Nutrition Therapy:  Current Nutrition Therapy:   [address] MCKENZIE Diet MSUW:368955702:::7}    Routes of Feeding: {P DME Other Feedings:031681609:::0}  Liquids: {Slp liquid thickness:84757}  Daily Fluid Restriction: {CHP DME Yes amt example:761945220:::0}  Last Modified Barium Swallow with Video (Video Swallowing Test): {Done Not Done JDIT:723292554:::1}    Treatments at the Time of Hospital Discharge:   Respiratory Treatments: ***  Oxygen Therapy:  {Therapy; copd oxygen:99425:::0}  Ventilator:    {[facility] Vent List:297758766:::0}    Rehab Therapies: {THERAPEUTIC INTERVENTION:0472670091}  Weight Bearing Status/Restrictions: {[facility] Weight Bearin:::0}  Other Medical Equipment (for information only, NOT a DME order):  {EQUIPMENT:081683556}  Other Treatments: ***    Patient's personal belongings (please select all that are sent with patient):  {[facility] DME Belongings:927454093:::0}    RN SIGNATURE:  {Esignature:195032128:::0}    CASE MANAGEMENT/SOCIAL WORK SECTION    Inpatient Status Date: ***    Readmission Risk Assessment Score:  Readmission Risk              Risk of Unplanned Readmission:        11           Discharging to Facility/ Agency   Name:   Address:  Phone:  Fax:    Dialysis Facility (if applicable)   Name:  Address:  Dialysis Schedule:  Phone:  Fax:    / signature: {Esignature:145660546:::0}    PHYSICIAN SECTION    Prognosis: {Prognosis:3348429296:::0}    Condition at Discharge: [address] [place] [address] Patient Condition:758683767:::0}    Rehab Potential (if transferring to Rehab): {Prognosis:8991896459:::0}    Recommended Labs or Other Treatments After Discharge: ***    PODIATRY DISCHARGE INSTRUCTIONS:  -You are to be NON weight bearing to the left lower extremity  -Do not lift or move heavy objects  -Do not drive  -Keep dressing clean, dry, intact until your first appointment with Dr. Phu Beltran.  -If your bandage becomes wet or soiled, please call Dr. Art Boswell's office immediately.  -Keep left lower extremity elevated above the level of the hear when lying/sitting for extended periods of time.  -Take all prescribed medications as directed. -If you experience nausea, vomiting, fever, chills, shortness breath, chest pain, present to the emergency department. Follow up - Call Dr. Woodward Early office to schedule follow up appointment within 1 week of discharge. Physician Certification: I certify the above information and transfer of Torito Chapman  is necessary for the continuing treatment of the diagnosis listed and that he requires {Admit to Appropriate Level of Care:99912:::0} for {GREATER/LESS:366008095} 30 days.      Update Admission H&P: {CHP DME Changes in HandP:530682492:::0}    PHYSICIAN SIGNATURE:  {Esignature:777904402:::0}

## 2021-03-04 NOTE — PROGRESS NOTES
Pre-Operative Note  Resident Note     Patient: Ronan Newton     Procedure: Incision and drainage with debridement of necrotic and nonviable soft tissue and bone, revisional transmetatarsal amputation, possible tendo-achilles lengthening, possible skin graft substitute application, possible wound vac application; left lower extremity    Consent: In chart     Labs:        Recent Labs     03/02/21  0637 03/03/21  0642   WBC 7.4 6.1   HGB 12.1* 11.5*   HCT 37.1* 35.1*   MCV 85.4 84.7    229        Recent Labs     03/02/21  0637 03/03/21  0642    137   K 3.9 3.8   CL 99 103   CO2 27 29   BUN 19 21*   CREATININE 1.1 1.2      No results for input(s): AST, ALT, ALB, BILIDIR, BILITOT, ALKPHOS in the last 72 hours. No results for input(s): LIPASE, AMYLASE in the last 72 hours. Recent Labs     03/03/21  0938   INR 1.01      No results for input(s): CKTOTAL, CKMB, CKMBINDEX, TROPONINI in the last 72 hours.     Saline lock/IV access: yes    Clearance for surgery: Yes, per medicine     Diet: NPO     CXR: cardiomegaly and clear lungs      EKG: Normal sinus rhythm, Nonspecific T wave abnormality     Echocardiogram: not done    PT/INR: 11.7/1.01    IVF: as needed    Abx: PO Doxycycline     Type and Screen: Not indicated    Pregnancy test: N/A    Known risk factors for perioperative complications: Diabetes mellitus  cardiomegaly      Anesthesia to see patient    Opal Forrest DPM  Podiatric Resident, PGY-1  Pager #: [phone] or Perfect Serve

## 2021-03-04 NOTE — PROGRESS NOTES
Patient admitted to PACU # 16 from OR at 1418 post TRANSMETATARSAL AMPUTATION WITH DELAYED PRIMARY CLOSURE, TENDOACHILLES LENGTHENING - Left   Per Leatha Ochoa DPM  .  Attached to PACU monitoring system and report received from anesthesia provider. Patient was reported to be hemodynamically stable during procedure. Patient sedated on admission and arrived with red oral airway in place. Pt was immediately placed on non-rebreather mask at 15L O2.

## 2021-03-05 LAB
ANION GAP SERPL CALCULATED.3IONS-SCNC: 7 MMOL/L (ref 3–16)
BASOPHILS ABSOLUTE: 0 K/UL (ref 0–0.2)
BASOPHILS RELATIVE PERCENT: 0.2 %
BUN BLDV-MCNC: 26 MG/DL (ref 7–20)
CALCIUM SERPL-MCNC: 8.2 MG/DL (ref 8.3–10.6)
CHLORIDE BLD-SCNC: 103 MMOL/L (ref 99–110)
CO2: 27 MMOL/L (ref 21–32)
CREAT SERPL-MCNC: 1.5 MG/DL (ref 0.8–1.3)
EOSINOPHILS ABSOLUTE: 0.3 K/UL (ref 0–0.6)
EOSINOPHILS RELATIVE PERCENT: 3.4 %
GFR AFRICAN AMERICAN: 58
GFR NON-AFRICAN AMERICAN: 48
GLUCOSE BLD-MCNC: 128 MG/DL (ref 70–99)
GLUCOSE BLD-MCNC: 160 MG/DL (ref 70–99)
GLUCOSE BLD-MCNC: 180 MG/DL (ref 70–99)
GLUCOSE BLD-MCNC: 210 MG/DL (ref 70–99)
GLUCOSE BLD-MCNC: 214 MG/DL (ref 70–99)
HCT VFR BLD CALC: 29.8 % (ref 40.5–52.5)
HEMOGLOBIN: 9.7 G/DL (ref 13.5–17.5)
LYMPHOCYTES ABSOLUTE: 1.4 K/UL (ref 1–5.1)
LYMPHOCYTES RELATIVE PERCENT: 14.9 %
MCH RBC QN AUTO: 28.1 PG (ref 26–34)
MCHC RBC AUTO-ENTMCNC: 32.5 G/DL (ref 31–36)
MCV RBC AUTO: 86.4 FL (ref 80–100)
MONOCYTES ABSOLUTE: 1 K/UL (ref 0–1.3)
MONOCYTES RELATIVE PERCENT: 10.9 %
NEUTROPHILS ABSOLUTE: 6.6 K/UL (ref 1.7–7.7)
NEUTROPHILS RELATIVE PERCENT: 70.6 %
PDW BLD-RTO: 15.1 % (ref 12.4–15.4)
PERFORMED ON: ABNORMAL
PLATELET # BLD: 217 K/UL (ref 135–450)
PMV BLD AUTO: 7.8 FL (ref 5–10.5)
POTASSIUM SERPL-SCNC: 4.2 MMOL/L (ref 3.5–5.1)
RBC # BLD: 3.45 M/UL (ref 4.2–5.9)
SODIUM BLD-SCNC: 137 MMOL/L (ref 136–145)
WBC # BLD: 9.4 K/UL (ref 4–11)

## 2021-03-05 PROCEDURE — C1751 CATH, INF, PER/CENT/MIDLINE: HCPCS

## 2021-03-05 PROCEDURE — 6370000000 HC RX 637 (ALT 250 FOR IP): Performed by: INTERNAL MEDICINE

## 2021-03-05 PROCEDURE — 36569 INSJ PICC 5 YR+ W/O IMAGING: CPT

## 2021-03-05 PROCEDURE — 99233 SBSQ HOSP IP/OBS HIGH 50: CPT | Performed by: INTERNAL MEDICINE

## 2021-03-05 PROCEDURE — 6370000000 HC RX 637 (ALT 250 FOR IP): Performed by: PODIATRIST

## 2021-03-05 PROCEDURE — 80048 BASIC METABOLIC PNL TOTAL CA: CPT

## 2021-03-05 PROCEDURE — 85025 COMPLETE CBC W/AUTO DIFF WBC: CPT

## 2021-03-05 PROCEDURE — 6360000002 HC RX W HCPCS: Performed by: INTERNAL MEDICINE

## 2021-03-05 PROCEDURE — 2580000003 HC RX 258: Performed by: PODIATRIST

## 2021-03-05 PROCEDURE — 97530 THERAPEUTIC ACTIVITIES: CPT

## 2021-03-05 PROCEDURE — 97162 PT EVAL MOD COMPLEX 30 MIN: CPT

## 2021-03-05 PROCEDURE — 6370000000 HC RX 637 (ALT 250 FOR IP): Performed by: STUDENT IN AN ORGANIZED HEALTH CARE EDUCATION/TRAINING PROGRAM

## 2021-03-05 PROCEDURE — 2500000003 HC RX 250 WO HCPCS: Performed by: INTERNAL MEDICINE

## 2021-03-05 PROCEDURE — 02HV33Z INSERTION OF INFUSION DEVICE INTO SUPERIOR VENA CAVA, PERCUTANEOUS APPROACH: ICD-10-PCS | Performed by: INTERNAL MEDICINE

## 2021-03-05 PROCEDURE — 94660 CPAP INITIATION&MGMT: CPT

## 2021-03-05 PROCEDURE — 97168 OT RE-EVAL EST PLAN CARE: CPT

## 2021-03-05 PROCEDURE — 36415 COLL VENOUS BLD VENIPUNCTURE: CPT

## 2021-03-05 PROCEDURE — 6360000002 HC RX W HCPCS: Performed by: PODIATRIST

## 2021-03-05 PROCEDURE — 2580000003 HC RX 258: Performed by: INTERNAL MEDICINE

## 2021-03-05 PROCEDURE — 2060000000 HC ICU INTERMEDIATE R&B

## 2021-03-05 RX ORDER — SODIUM CHLORIDE 0.9 % (FLUSH) 0.9 %
10 SYRINGE (ML) INJECTION EVERY 12 HOURS SCHEDULED
Status: DISCONTINUED | OUTPATIENT
Start: 2021-03-05 | End: 2021-03-11 | Stop reason: HOSPADM

## 2021-03-05 RX ORDER — FUROSEMIDE 40 MG/1
40 TABLET ORAL 2 TIMES DAILY
Status: DISCONTINUED | OUTPATIENT
Start: 2021-03-05 | End: 2021-03-11 | Stop reason: HOSPADM

## 2021-03-05 RX ORDER — CLOPIDOGREL BISULFATE 75 MG/1
75 TABLET ORAL DAILY
Status: DISCONTINUED | OUTPATIENT
Start: 2021-03-05 | End: 2021-03-11 | Stop reason: HOSPADM

## 2021-03-05 RX ORDER — OXYCODONE HYDROCHLORIDE AND ACETAMINOPHEN 5; 325 MG/1; MG/1
2 TABLET ORAL EVERY 4 HOURS PRN
Status: DISCONTINUED | OUTPATIENT
Start: 2021-03-05 | End: 2021-03-11 | Stop reason: HOSPADM

## 2021-03-05 RX ORDER — SODIUM CHLORIDE 0.9 % (FLUSH) 0.9 %
10 SYRINGE (ML) INJECTION PRN
Status: DISCONTINUED | OUTPATIENT
Start: 2021-03-05 | End: 2021-03-11 | Stop reason: HOSPADM

## 2021-03-05 RX ORDER — OXYCODONE HYDROCHLORIDE 5 MG/1
5 TABLET ORAL EVERY 4 HOURS PRN
Status: DISCONTINUED | OUTPATIENT
Start: 2021-03-05 | End: 2021-03-05

## 2021-03-05 RX ORDER — GUAIFENESIN 600 MG/1
600 TABLET, EXTENDED RELEASE ORAL 2 TIMES DAILY
Status: DISCONTINUED | OUTPATIENT
Start: 2021-03-05 | End: 2021-03-11 | Stop reason: HOSPADM

## 2021-03-05 RX ORDER — LIDOCAINE HYDROCHLORIDE 10 MG/ML
5 INJECTION, SOLUTION EPIDURAL; INFILTRATION; INTRACAUDAL; PERINEURAL ONCE
Status: COMPLETED | OUTPATIENT
Start: 2021-03-05 | End: 2021-03-05

## 2021-03-05 RX ORDER — OXYCODONE HYDROCHLORIDE AND ACETAMINOPHEN 5; 325 MG/1; MG/1
1 TABLET ORAL EVERY 4 HOURS PRN
Status: DISCONTINUED | OUTPATIENT
Start: 2021-03-05 | End: 2021-03-11 | Stop reason: HOSPADM

## 2021-03-05 RX ADMIN — MEROPENEM 1000 MG: 1 INJECTION, POWDER, FOR SOLUTION INTRAVENOUS at 20:42

## 2021-03-05 RX ADMIN — METOPROLOL SUCCINATE 12.5 MG: 25 TABLET, EXTENDED RELEASE ORAL at 21:54

## 2021-03-05 RX ADMIN — Medication 10 ML: at 21:55

## 2021-03-05 RX ADMIN — GUAIFENESIN 600 MG: 600 TABLET, EXTENDED RELEASE ORAL at 21:54

## 2021-03-05 RX ADMIN — CLOPIDOGREL BISULFATE 75 MG: 75 TABLET, FILM COATED ORAL at 09:37

## 2021-03-05 RX ADMIN — GABAPENTIN 300 MG: 300 CAPSULE ORAL at 21:54

## 2021-03-05 RX ADMIN — DOXYCYCLINE 100 MG: 50 CAPSULE ORAL at 08:58

## 2021-03-05 RX ADMIN — LIDOCAINE HYDROCHLORIDE 5 ML: 10 INJECTION, SOLUTION EPIDURAL; INFILTRATION; INTRACAUDAL; PERINEURAL at 17:34

## 2021-03-05 RX ADMIN — PANTOPRAZOLE SODIUM 40 MG: 40 TABLET, DELAYED RELEASE ORAL at 08:58

## 2021-03-05 RX ADMIN — LOSARTAN POTASSIUM 25 MG: 25 TABLET, FILM COATED ORAL at 08:58

## 2021-03-05 RX ADMIN — Medication 5 MG: at 21:54

## 2021-03-05 RX ADMIN — INSULIN LISPRO 1 UNITS: 100 INJECTION, SOLUTION INTRAVENOUS; SUBCUTANEOUS at 09:03

## 2021-03-05 RX ADMIN — ENOXAPARIN SODIUM 40 MG: 40 INJECTION SUBCUTANEOUS at 08:59

## 2021-03-05 RX ADMIN — INSULIN GLARGINE 70 UNITS: 100 INJECTION, SOLUTION SUBCUTANEOUS at 21:56

## 2021-03-05 RX ADMIN — METOPROLOL SUCCINATE 12.5 MG: 25 TABLET, EXTENDED RELEASE ORAL at 08:58

## 2021-03-05 RX ADMIN — DULOXETINE HYDROCHLORIDE 90 MG: 60 CAPSULE, DELAYED RELEASE ORAL at 08:58

## 2021-03-05 RX ADMIN — ATORVASTATIN CALCIUM 20 MG: 20 TABLET, FILM COATED ORAL at 21:54

## 2021-03-05 RX ADMIN — OXYCODONE HYDROCHLORIDE AND ACETAMINOPHEN 2 TABLET: 5; 325 TABLET ORAL at 15:12

## 2021-03-05 RX ADMIN — GUAIFENESIN 600 MG: 600 TABLET, EXTENDED RELEASE ORAL at 11:54

## 2021-03-05 RX ADMIN — INSULIN LISPRO 1 UNITS: 100 INJECTION, SOLUTION INTRAVENOUS; SUBCUTANEOUS at 12:47

## 2021-03-05 RX ADMIN — PRAMIPEXOLE DIHYDROCHLORIDE 0.5 MG: 0.25 TABLET ORAL at 21:54

## 2021-03-05 RX ADMIN — FUROSEMIDE 40 MG: 40 TABLET ORAL at 17:57

## 2021-03-05 RX ADMIN — ASPIRIN 81 MG: 81 TABLET, FILM COATED ORAL at 08:58

## 2021-03-05 RX ADMIN — TAMSULOSIN HYDROCHLORIDE 0.8 MG: 0.4 CAPSULE ORAL at 08:59

## 2021-03-05 RX ADMIN — INSULIN LISPRO 2 UNITS: 100 INJECTION, SOLUTION INTRAVENOUS; SUBCUTANEOUS at 17:58

## 2021-03-05 RX ADMIN — INSULIN LISPRO 40 UNITS: 100 INJECTION, SOLUTION INTRAVENOUS; SUBCUTANEOUS at 17:59

## 2021-03-05 RX ADMIN — GABAPENTIN 300 MG: 300 CAPSULE ORAL at 08:58

## 2021-03-05 RX ADMIN — PANTOPRAZOLE SODIUM 40 MG: 40 TABLET, DELAYED RELEASE ORAL at 21:55

## 2021-03-05 RX ADMIN — Medication 10 ML: at 09:01

## 2021-03-05 RX ADMIN — MEROPENEM 1000 MG: 1 INJECTION, POWDER, FOR SOLUTION INTRAVENOUS at 11:58

## 2021-03-05 RX ADMIN — BENZOCAINE AND MENTHOL 1 LOZENGE: 15; 3.6 LOZENGE ORAL at 17:57

## 2021-03-05 RX ADMIN — GABAPENTIN 300 MG: 300 CAPSULE ORAL at 13:58

## 2021-03-05 RX ADMIN — OXYCODONE 5 MG: 5 TABLET ORAL at 00:56

## 2021-03-05 RX ADMIN — CHOLESTYRAMINE 4 G: 4 POWDER, FOR SUSPENSION ORAL at 08:59

## 2021-03-05 ASSESSMENT — PAIN SCALES - GENERAL
PAINLEVEL_OUTOF10: 0
PAINLEVEL_OUTOF10: 5
PAINLEVEL_OUTOF10: 0
PAINLEVEL_OUTOF10: 3
PAINLEVEL_OUTOF10: 0
PAINLEVEL_OUTOF10: 3
PAINLEVEL_OUTOF10: 4
PAINLEVEL_OUTOF10: 10

## 2021-03-05 ASSESSMENT — PAIN DESCRIPTION - ONSET: ONSET: ON-GOING

## 2021-03-05 ASSESSMENT — PAIN DESCRIPTION - ORIENTATION: ORIENTATION: LEFT

## 2021-03-05 ASSESSMENT — PAIN DESCRIPTION - FREQUENCY: FREQUENCY: CONTINUOUS

## 2021-03-05 ASSESSMENT — PAIN DESCRIPTION - LOCATION: LOCATION: FOOT

## 2021-03-05 ASSESSMENT — PAIN DESCRIPTION - PAIN TYPE: TYPE: ACUTE PAIN;SURGICAL PAIN

## 2021-03-05 ASSESSMENT — PAIN DESCRIPTION - DESCRIPTORS: DESCRIPTORS: SHARP

## 2021-03-05 ASSESSMENT — PAIN DESCRIPTION - PROGRESSION: CLINICAL_PROGRESSION: GRADUALLY WORSENING

## 2021-03-05 NOTE — PLAN OF CARE
Problem: Falls - Risk of:  Goal: Absence of physical injury  Description: Absence of physical injury  Outcome: Ongoing     Problem: Skin Integrity:  Goal: Will show no infection signs and symptoms  Description: Will show no infection signs and symptoms  Outcome: Ongoing     Problem: Skin Integrity:  Goal: Absence of new skin breakdown  Description: Absence of new skin breakdown  Outcome: Ongoing     Problem: Pain:  Goal: Pain level will decrease  Description: Pain level will decrease  Outcome: Ongoing     Problem: Pain:  Goal: Control of acute pain  Description: Control of acute pain  Outcome: Ongoing     Problem: Pain:  Goal: Control of chronic pain  Description: Control of chronic pain  Outcome: Ongoing

## 2021-03-05 NOTE — PROGRESS NOTES
Physical Therapy    Facility/Department: [address]  Re-Assessment/Treatment  NAME: Lili Frausto  : 1960  MRN: [id number]    Date of Service: 3/5/2021    Discharge Recommendations:    Lili Frausto scored a  on the AM-PAC short mobility form. Current research shows that an AM-PAC score of 17 or less is typically not associated with a discharge to the patient's home setting. Based on the patient's AM-PAC score and their current functional mobility deficits, it is recommended that the patient have 3-5 sessions per week of Physical Therapy at d/c to increase the patient's independence. Please see assessment section for further patient specific details. If patient discharges prior to next session this note will serve as a discharge summary. Please see below for the latest assessment towards goals. PT Equipment Recommendations  Equipment Needed: No(defer to next level of care)    Assessment   Body structures, Functions, Activity limitations: Decreased functional mobility ; Decreased endurance;Decreased strength;Decreased balance  Assessment: Pt reporting feeling fatigued and weaker in the past few days since hospital admission. Pt unable to perform lateral transfer this date 2/2 fatigue, weakness and heavy coughing bouts. Discussed with pt and RN that pt will require rosi lift for safe transfers at this time. Pt heavily limted by LLIRAIS NWB and that he does not have his RLE prosthesis that he normally uses for transfers. Pt is well below her baseline and would benefit from further skilled PT to maximize safety and independence with functional mobility. Will continue to follow.   Treatment Diagnosis: Decreased functional therapy  Prognosis: Fair  Decision Making: Medium Complexity  Patient Education: role of PT, use of call light and d/c planning - pt verb understanding  Barriers to Learning: none  REQUIRES PT FOLLOW UP: Yes  Activity Tolerance  Activity Tolerance: Patient limited by fatigue;Patient limited by endurance; Other  Activity Tolerance: Pt verb fatigue from lack of sleep recently. Pt with several vigorous bouts of productive coughing - RN notified. Patient Diagnosis(es): The primary encounter diagnosis was Dehiscence of operative wound, initial encounter. Diagnoses of Hx of diabetic neuropathy and Critical lower limb ischemia were also pertinent to this visit. has a past medical history of CAD (coronary artery disease), Cerebral artery occlusion with cerebral infarction ([address] and Diabetes mellitus ([address] has a past surgical history that includes Coronary artery bypass graft; Leg amputation below knee (Right); Foot Amputation (Left, 3/4/2021); and Achilles tendon surgery (Left, 3/4/2021). Restrictions  Position Activity Restriction  Other position/activity restrictions: up as tolerated, NWB LLE  Vision/Hearing  Vision: Impaired  Vision Exceptions: Wears glasses at all times(decreased peripheral vision)  Hearing: Exceptions to 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 Family / Caregiver Present: No  Referring Practitioner: Tomy Gonzalez DPM  Diagnosis: wound infection  General Comment  Comments: Pt with several bouts of vigorous productive coughing spells - RN notified. Subjective  Subjective: Pt found sitting in recliner reporting unrated pain in L foot and agreeable to therapy. Pt stating he feels generally weak/unwell/out of it with reports of poor sleep recently.   Pain Screening  Patient Currently in Pain: Yes          Orientation  Orientation  Overall Orientation Status: Within Normal Limits  Social/Functional History  Social/Functional History  Lives With: Other (comment)(newly devorced, unable to stay with family, living in nursing home- left d/t poor care, left and currently staying with roomate)  Type of Home: Trailer  Home Layout: One level  Home Access: Ramped entrance  Bathroom Shower/Tub: Tub/Shower unit  Bathroom Toilet: Standard  Bathroom Equipment: Tub transfer bench  Home Equipment: [address]  ADL Assistance: Independent  Homemaking Assistance: Needs assistance(groceries delivered, completes cooking, occassional cleaning, pt folds laundry but cannot get into laundry room)  Homemaking Responsibilities: Yes(share with roomate)  Ambulation Assistance: Independent(with w/c)  Transfer Assistance: Independent  Active : No  Patient's  Info: roommate  Additional Comments: reporting a fall 2-3 months ago, has RLE prosthetic leg    Objective  AROM RLE (degrees)  RLE AROM: WFL  AROM LLE (degrees)  LLE AROM : WFL  LLE General AROM: R BKA, WFL hip and knee  Strength RLE  Comment: >3+/5 per pt's ability to perform functional mobility  Strength LLE  Comment: >3+/5 at hip and knee        Bed mobility  Scootin Person assistance(Dep Ax2 further back into chair)  Comment: NT - pt up in chair at start and end of session. Transfers  Comment: Lateral transfer from recliner to chair attempted multiple times per pt report of \"This is how I've been doing it\" however each attempt unsuccessful. Pt reports he normally stand pivots on RLE prosthetic but doesnt have prosthesis at home. Pt with several vigorous productive bouts of coughing throughout session with reports of overall fatigue - RN noted. Discussed with pt and RN safest mode of transfer for pt at this time 2/2 R BKA and L NWB is rosi. Pt reports he wants to stay in the recliner and doesn't want to get into bed.                  Plan   Plan  Times per week: 2-5  Times per day: Daily  Current Treatment Recommendations: Strengthening, Balance Training, Functional Mobility Training, Transfer Training, Endurance Training, Safety Education & Training  Safety Devices  Type of devices: Gait belt, Nurse notified, Call light within reach, Chair alarm in place, Left in chair    AM-PAC Score  AM-PAC Inpatient Mobility Raw Score : 14 (03/03/21 1321)  AM-PAC Inpatient T-Scale Score : 38.1 (03/03/21 1321)  Mobility Inpatient CMS 0-100% Score: 61.29 (03/03/21 1321)  Mobility Inpatient CMS G-Code Modifier : CL (03/03/21 1321)          Goals  Short term goals  Time Frame for Short term goals: d/c  Short term goal 1: scooting mod ax1  Short term goal 2: pt will tolerate bed<>chair transfer  Patient Goals   Patient goals : none stated       Therapy Time   Individual Concurrent Group Co-treatment   Time In 1005         Time Out 1043         Minutes 38           Timed Code Treatment Minutes:  23    Total Treatment Minutes:  38    If the patient is discharged before the next treatment session, this note will serve as the discharge summary.      Rosette Kocher, PT, DPT

## 2021-03-05 NOTE — CARE COORDINATION
Referred to patient for SNF. Patient will need IV antibiotics, wound care, and PT/OT. Spoke to patient. He is agreeable. Reviewed list of SNF facilities in network with insurance includin [facility], [facility] [address] [place] [facility] and [facility]. Referrals made to [address], [address] and [facility]. Await responses. No other needs at this time. Electronically signed by RENETTA Fox LISW-S on 3/5/2021 at 1:57 PM     UPDATE:  Spoke to patient. He is requesting to go to [address] in [place], [phone]. Per website this is an Assisted Living facility. Call placed and message left to see if they offer SNF care for IV antibiotics and wounds. Patient states he has been there in past and plans to would like to eventually go back as this is close to his family. Discussed facilities in network with insurance. Patient aware he would have to privately pay. List of facilities in network with insurance in that area provided. Patient requested I contact his daughter, Keegan Cuellar. Daughter called and message left. Received response from [facility], they can accept patient on d/c. Await return call from daughter.   Electronically signed by RENETTA Fox, TARSHA on 3/5/2021 at 3:28 PM

## 2021-03-05 NOTE — CONSULTS
Please refer to original consult noted on 3/1/2021.       Tigre Carter DPM   Podiatric Resident, PGY-2  Perfect serve

## 2021-03-05 NOTE — PROCEDURES
SINGLE PICC PROCEDURE NOTE  Chart reviewed for allergies, diagnosis, labs, known contraindications, reason for line placement and planned length of treatment. Informed consent noted to be signed and on chart. Insertion procedure discussed with patient/family member. Three patient identifiers - Patient name,   and MRN -  completed &  confirmed verbally. Time out performed Hat, mask and eye shield donned. PICC site scrubbed with Chloraprep  One-Step applicator for 30 seconds x 1. Hand Hygiene  performed with 3% Chlorhexidine surgical scrub x1 min prior to  Sterile gloves, sterile gown being donned. Patient draped using maximal sterile barrier technique ( head to toe ). PICC site scrubbed a 2nd time with Chloraprep One-Step applicator x 30 sec. Modified Seldinger  technique/ultrasound assisted and tip locating system utilized for insertion. 1% Lidocaine 5 ml injected intradermal pre-insertion. PICC tip location in the SVC confirmed by ECG technology Sherlock 3CG. Positive brisk blood return obtained from all lumens  and each lumen flushed with 10 mls  0.9% Sterile Sodium Chloride. All lumens flush easily with no resistance. Valve placed on all lumens followed by Alcohol Swab Caps on end of each. Bio-patch in place. Catheter secured with non-sutured locking device per hospital protocol. Sterile Tegaderm applied over PICC site. Sterile field maintained during procedure. Positioning and rhythm wire accounted for post procedure and disposed of in sharps. Appearance of site is Clean dry and intact without bleeding or edema. All edges of Tegaderm occlusive. Site marked with date and initials of RN placing line. Teaching performed to pt/family and noted in education section. Bed placed in low position post-procedure. Top 2 side rails in up position call button in reach. Pt. Response to procedure tolerated well.   RN notified of all of the above. A Single Lumen Power PICC was trimmed at 44 CM and placed  LANCE per basilic vein, 0 cm showing from insertion site.

## 2021-03-05 NOTE — PROGRESS NOTES
ID Follow-up NOTE  Medical Student note - reviewed and modified, see Attending addendum at bottom    CC:   Dehiscence of TMA amputation site  Antibiotics: Doxycycline    Admit Date: 3/1/2021  Hospital Day: 5    Subjective:     Patient seen this morning resting comfortably in his chair. Patient was hypoxic yesterday post op, requiring 15L non-rebreather. O2 sat was corrected and oxygen requirements have been decreasing. O2 sat low overnight on 2L NC (90). Remains the same this morning but patient states he does not feel short of breath. Patient states he did not sleep well and had a \"rough night\" due to the pain. This morning he is lethargic but oriented, and without increased work of breathing. He reports that his pain is well controlled and states both legs feel fine. Patient denies any pain currently. He denies change in vision, fever, chills, n/v, chest pain, SOB, and any numbness or tingling. Objective:     Patient Vitals for the past 8 hrs:   BP Temp Temp src Pulse Resp SpO2 Weight   03/05/21 0807 (!) 146/73 99.6 °F (37.6 °C) Oral 105 20 90 % --   03/05/21 0504 -- -- -- -- -- -- 274 lb 4 oz (124.4 kg)   03/05/21 0329 (!) 106/53 99.9 °F (37.7 °C) Oral 101 18 90 % --     I/O last 3 completed shifts: In: 1179.6 [P.O.:180; I.V.:999.6]  Out: 550 [Urine:550]  No intake/output data recorded. EXAM:  GENERAL: No apparent distress. HEENT: Membranes moist, no oral lesion  NECK:  Supple, no lymphadenopathy  LUNGS: Clear b/l, no rales, no dullness  CARDIAC: RRR, no murmur appreciated  ABD:  + BS, soft / NT  EXT:  Right BKA well healed; covered with sleeve. Left LE / stump with dressing up to tibial head. (Images in Epic with transmetatarsal amputation dehisced with exposed bone).   NEURO: No focal neurologic findings  PSYCH: Orientation, sensorium, mood normal  LINES:  Peripheral iv       Data Review:  Lab Results   Component Value Date    WBC 9.4 03/05/2021    HGB 9.7 (L) 03/05/2021    HCT 29.8 (L) 03/05/2021    MCV 86.4 03/05/2021     03/05/2021     Lab Results   Component Value Date    CREATININE 1.5 (H) 03/05/2021    BUN 26 (H) 03/05/2021     03/05/2021    K 4.2 03/05/2021     03/05/2021    CO2 27 03/05/2021       Hepatic Function Panel: No results found for: ALKPHOS, ALT, AST, PROT, BILITOT, BILIDIR, IBILI, LABALBU    MICRO:  3/3 COVID-19, rapid - not detected  3/3 Cx, Wound - Enterobacter cloacae; Proteus mirabilis (heavy growth)    3/4 Surgical Pathology - In process    IMAGING:  3/1 XR L Foot -   Vascular calcification is noted. There is no comparison study to immediate postoperative radiographs or presurgical appearance of the foot. No discrete soft tissue gas.       If concerned for ongoing infection, MRI without and with contrast is recommend.      3/2 IR Angiogram LEFT lower extremity -   1. abdominal aorta is patent with patent mesenteric's and renal vessels. 2. left common femoral artery profunda femoris is patent. 3.  Left superficial femoral arteries patent with patent left popliteal artery. 4.  Left anterior tibial artery distal 99% stenosis with subtotal occlusion of the distal left dorsalis pedis artery. 5.  Left peroneal artery occludes distally. 6.  Left posterior tibial artery is occluded at the ostium. 7.  Plantar artery is occluded with incomplete plantar loop.     3/3 CXR - Cardiomegaly; Sternotomy wires noted; Clear lungs    3/3 IR Angio LEFT lower extremity    3/4 XR Left Foot - Further amputation of the metatarsals without complicating features    Scheduled Meds:   enoxaparin  40 mg Subcutaneous Daily    doxycycline monohydrate  100 mg Oral 2 times per day    sodium chloride flush  10 mL Intravenous 2 times per day    melatonin  5 mg Oral Nightly    aspirin  81 mg Oral Daily    cilostazol  100 mg Oral BID    cholestyramine light  4 g Oral Daily    DULoxetine  90 mg Oral Daily    gabapentin  300 mg Oral TID    insulin glargine  70 Units Subcutaneous MD Melvin Moreno MS4    Addendum to Medical Student Progress note:  Pt seen,examined and evaluated. I have independently performed history, physical exam, lab and data review. I have determined assessment and plan as documented by student Aditi Huddleston).    64 yo DM, neuropathy, PVD, CAD / CABG, R BKA  Hx L LE PVD  Recent L TMA - 1/7/21      Pt had dehiscence of L TMA wound weeks / months ago (pt is vague historian)  No drainage, no pain. High BS     Admit 3/1 -   Seen by Podiatry, Cardiology  Reviewed images of stump wound. Exposed MT 1/2/3 per Podiatry note  Started on vancomycin + cefepime  L LE cath 3/2 with L ant tib a stenosis, L peroneal / post tib a occlusion    3/3 - L LE angio with L ant tib and plantar a balloon angioplasty    3/4 - Revision L TMA with secondary closure     IMP/  Mult co-morbidities including DM, PVD, R BKA  L TMA dehiscence with exposed MT  PVD    S/p revascularization, TMA revision, closure   Due to limb threatening nature of pt's situation, will recommend iv antibiotic to lower risk dehiscence, need for further revision or limb loss.       REC/  Start meropenem  D/c doxycycline  Will f/u on Proteus, Enterobacter sensitivities  Would care per per Podiatry      PICC  See JONAH    Discussed with pt, Podiatry Residents  Efrain Hernandez MD    INFUSION ORDERS:  [place] Hoose 1 gm iv daily through 3/31  - First dose given in hospital  - PICC   - Disposition / date discharge  - Check CBC w diff, CMP, ESR, CRP every Mon or Tue - FAX result to [phone]  - Call with antibiotic / infusion issues, [phone]  - If facility - call with any change in status, transfer out of facility or to hospital - [phone]  - No f/u in outpatient ID office necessary.   F/u Podiatry, interventional Card after discharge

## 2021-03-05 NOTE — PLAN OF CARE
Problem: Falls - Risk of:  Goal: Will remain free from falls  Description: Will remain free from falls  Outcome: Ongoing  Note: Pt is a fall risk. See [place] Siemens Fall Score. Pt bed is in low position, side rails up, call light and belongings are in reach. Bed alarm is on. Pt encouraged to call for assistance. Will continue with hourly rounds for po intake, pain needs, toileting and repositioning as needed. Will continue to monitor for needs       Problem: Skin Integrity:  Goal: Will show no infection signs and symptoms  Description: Will show no infection signs and symptoms  3/5/2021 1036 by Balwinder Wells RN  Outcome: Ongoing  Note: Pt is at risk for skin breakdown. Pt has redness/excoriation to buttocks. Surgical incision to LLE, dressing CDI. Pt up in chair. Continent of bowel and bladder. Problem: Pain:  Goal: Pain level will decrease  Description: Pain level will decrease  3/5/2021 1036 by Balwinder Wells RN  Outcome: Ongoing  Note: Pt has been sleeping this morning. No c/o pain at this time. Will monitor and medicate as needed.

## 2021-03-05 NOTE — PROGRESS NOTES
Patient alert and oriented times four. Patient vss this shift. Patient on Telemetry in NSR. Patient iv is in left hand. Patient on bedrest. Patient using uses the urinal. patient has no complaints. Patient adbominal sounds active. Patient has surgical site to left foot and surgical site to right leg. Skin otherwise is clean dry and intact. Patient breath full of rhonci. Patient bed locked in lowest position with bed alarm on. Call light bedside table and belongings in reach. Patient encouraged to call with any and all needs. Patient verbalizes understanding and call appropriately. Will continue to monitor.

## 2021-03-05 NOTE — PROGRESS NOTES
Physician Progress Note      Karolyn Espitia  CSN #:                  [id number]  :                       1960  ADMIT DATE:       3/1/2021 3:05 PM  [address] [place] DATE:  RESPONDING  PROVIDER #:        Jojo Reynoso MD          QUERY TEXT:    Pt admitted with wound dehiscence of LLE transmetatarsal amputation, and   critical limb ischemia. Per Attending progress notes \"Wound infection\" noted. Wound cx: Enterobacter cloacae. Infection not noted by Podiatry nor ID   consults. If possible, please document in progress notes and discharge   summary:    The medical record reflects the following:  Risk Factors: DM w/ PAD, wound dehiscence  Clinical Indicators: Wound cx: Enterobacter cloacae; Per Pod Op note 3/4   \"wound was noted to have a fibrotic, nonviable soft tissue; (wound base)   necrotic and nonviable tissue was excised; Per ID c/s \"Left foot post-surgical   wound dehiscence w/o signs of infection\". Surgical Path pending  Treatment: Vanc/ Cefepime x 2 days, ID recs Doxycycline, S/P 3/4- debridement   of necrotic and nonviable soft tissue and bone, revisional transmetatarsal   amputation  Options provided:  -- Stump infection present on admission  -- Stump infection ruled out  -- Other - I will add my own diagnosis  -- Disagree - Not applicable / Not valid  -- Disagree - Clinically unable to determine / Unknown  -- Refer to Clinical Documentation Reviewer    PROVIDER RESPONSE TEXT:    Patient has stump infection present on admission. Query created by:  Favio Booth on 3/5/2021 10:01 AM      Electronically signed by:  Jojo Reynoso MD 3/5/2021 12:19 PM

## 2021-03-05 NOTE — PROGRESS NOTES
per day    insulin lispro (1 Unit Dial)  40 Units Subcutaneous TID AC     PRN Meds: oxyCODONE-acetaminophen **OR** oxyCODONE-acetaminophen, sodium chloride flush, acetaminophen, ipratropium-albuterol, sodium chloride flush, promethazine **OR** ondansetron, polyethylene glycol, acetaminophen **OR** acetaminophen      Intake/Output Summary (Last 24 hours) at 3/5/2021 1235  Last data filed at 3/5/2021 1131  Gross per 24 hour   Intake 400 ml   Output 1125 ml   Net -725 ml       Physical Exam Performed:    /67   Pulse 99   Temp 99.6 °F (37.6 °C) (Oral)   Resp 18   Ht 5' 6\" (1.676 m)   Wt 274 lb 4 oz (124.4 kg)   SpO2 94%   BMI 44.27 kg/m²     General appearance: NAD  HEENT: Pupils equal, round, and reactive to light. Conjunctivae/corneas clear. Neck: Supple, with full range of motion. No jugular venous distention. Trachea midline. Respiratory: Bilateral rhonchorous breathing sounds, no wheeze. Cardiovascular: Regular rate and rhythm with normal S1/S2 without murmurs, rubs or gallops. Abdomen: Soft, non-tender, non-distended with normal bowel sounds. Musculoskeletal: Right AKA covered with dressing, left foot metatarsal amputation covered with dressing no obvious sign of discharge. Pictures from podiatry note reviewed  Neurologic:  Neurovascularly intact without any focal sensory/motor deficits.  Cranial nerves: II-XII intact, grossly non-focal.  Psychiatric: Alert and oriented, thought content appropriate, normal insight  Capillary Refill: Brisk,< 3 seconds   Peripheral Pulses: +2 palpable, equal bilaterally       Labs:   Recent Labs     03/03/21  0642 03/04/21  0033 03/05/21  0614   WBC 6.1 8.0 9.4   HGB 11.5* 11.5* 9.7*   HCT 35.1* 36.0* 29.8*    251 217     Recent Labs     03/03/21  0642 03/04/21  0033 03/05/21  0614    137 137   K 3.8 4.2 4.2    102 103   CO2 29 23 27   BUN 21* 25* 26*   CREATININE 1.2 1.3 1.5*   CALCIUM 8.8 8.4 8.2*     No results for input(s): AST, ALT,

## 2021-03-05 NOTE — PROGRESS NOTES
Patient refusing to lay down to sleep until getting to the chair. Called charge nurse to get slide board. To move patient to chair. Will continue to monitor.

## 2021-03-05 NOTE — PROGRESS NOTES
Podiatric Surgery Daily Progress Note  Stephen Fish      Subjective :   Patient seen and examined this am at the bedside. Reports throbbing pain overnight. Patient denies any acute overnight events. Patient denies N/V/F/C/SOB. Patient denies calf pain, thigh pain, chest pain. Review of Systems: A 12 point review of symptoms is unremarkable with the exception of the chief complaint. Patient specifically denies nausea, fever, vomiting, chills, shortness of breath, chest pain, abdominal pain, constipation or difficulty urinating. Objective     BP (!) 106/53   Pulse 101   Temp 99.9 °F (37.7 °C) (Oral)   Resp 18   Ht 5' 6\" (1.676 m)   Wt 274 lb 4 oz (124.4 kg)   SpO2 90%   BMI 44.27 kg/m²     I/O:    Intake/Output Summary (Last 24 hours) at 3/5/2021 0555  Last data filed at 3/4/2021 2358  Gross per 24 hour   Intake 1898.35 ml   Output 1125 ml   Net 773.35 ml              Wt Readings from Last 3 Encounters:   03/05/21 274 lb 4 oz (124.4 kg)       LABS:    Recent Labs     03/03/21  0642 03/04/21  0033   WBC 6.1 8.0   HGB 11.5* 11.5*   HCT 35.1* 36.0*    251        Recent Labs     03/04/21  0033      K 4.2      CO2 23   BUN 25*   CREATININE 1.3        Recent Labs     03/03/21  0938   INR 1.01           FOCUSED LEFT LOWER EXTREMITY EXAMINATION:    Dressing to left lower extremity clean, dry, intact. No strikethrough drainage noted to the external layers of the dressing. Sanguinous drainage to internal layers of dressing. VASCULAR: DP and PT pulses nonpalpable 0/4. Upon hand-held Doppler examination, DP pulse noted to be monophasic, PT pulse noted to be faintly monophasic. CFT is less immediate to the dorsal distal stump, CFT is less than 3 seconds to the plantar distal stump. Skin temperature is warm to cool from proximal to distal with no focal calor noted. No edema noted. No pain with calf compression.     NEUROLOGIC: Gross and epicritic sensation is diminished.  Protective sensation is diminished at all pedal sites.     DERMATOLOGIC: Surgical incision noted to the distal stump of the left foot with sutures intact and no signs of dehiscence. No malodor, no purulent drainage noted. No fluctuance or crepitus noted. Blue vessel loop drain noted to the dorsal aspect of the foot. Upon removal of the drain, scant sanguinous oozing noted, no purulent drainage. Three surgical incisions noted to the posterior aspect of the left leg with sutures intact and no signs of dehiscence. Blanchable periincisional erythema consistent with post-operative state. No acute signs of infection. No open wounds noted. Images from 3/5. MUSCULOSKELETAL: Muscle strength is 5/5 for all pedal groups tested. No pain with palpation of the foot or ankle. History of BKA to RLE, TMA LLE. IMAGING:  Narrative   XR FOOT LEFT (2 VIEWS)       Indication: assess for underlying gas formation at recent amputation site        COMPARISON: None       Findings: AP and lateral views of the left foot were obtained. Transmetatarsal amputation of all digits is noted. There is overlying packing material at the surgical site.       Vascular calcification is noted. There is no comparison study to immediate postoperative radiographs or presurgical appearance of the foot. No discrete soft tissue gas.       If concerned for ongoing infection, MRI without and with contrast is recommend along with clinical correlation with lab parameters.            Impression   Impression: Limited study, as above. No comparisons are available. ASSESSMENT/PLAN  1.  s/p Revision of Transmetatarsal amputation due to osteomyelitis, tendo-achilles lengthening, delayed primary closure; left LE (DOS: 3/4/21)  2. Peripheral arterial disease  3. History of BKA; RLE  4. History of TMA; LLE  5.  Type 1 DM    -Patient examined and evaluated at bedside   -Hypertensive, tachycardic, otherwise VSS, no leukocytosis noted (WBC 9.4)  -CRP 30.6, ESR 99  -HbA1c 9.1%, prolonged discussion with patient regarding the need for better glucose control to aid in healing.   -Prealbumin 16.3; continue nutritional dietary supplementation  -Imaging reviewed, impression noted above  -Wound culture- enterobacter cloacae, proteus mirabilis. Sensitivities pending  -Surgical path pending, will f/u  -Laser perfusion study to be done as outpatient to determine need for further vascular intervention  -Dressing applied to left LE consisting of betadine, adaptic, gauze, ABD, kerlix, ACE bandage with gentle compression  -Blue vessel loop drain removed without incident. Less than 3cc of bleeding noted. Hemostasis achieved with direct pressure. Patient tolerated well. -ID following, continue antibiotics per their recommendations  -Nonweightbearing to left lower extremity  -Percocet pain panel ordered  -Please resume anticoagulation per Dr. Linda Conroy recommendations. -Given the patient's co-morbidities, history of amputations, he is at high risk for limb loss. He would benefit from remaining in-house until surgical pathology results to determine outpatient antibiotic plan. DISPO: s/p TMA, JONATHAN, with delayed primary closure; continue antibiotics per ID recommendations; Surgical pathology pending; Given his co-morbidities, history of amputation, he is at high risk for limb loss and would benefit from remaining in-house until surgical pathology results to determine antibiotic plan on discharge. Patient examined and evaluated at bedside with Dr. Annette Gracia DPM.    Luciano Gramajo DPM  Podiatric Resident, PGY-1  Pager #: [phone] or [employer]     Patient was seen and evaluated at bedside. Agree with residents assessment and treatment plan.   Annette Gracia DPM

## 2021-03-05 NOTE — PROGRESS NOTES
Occupational Therapy   Occupational Therapy Re- Evaluation Assessment  Date: 3/5/2021   Patient Name: Keaton Camilo  MRN: [id number]     : 1960    Date of Service: 3/5/2021    Discharge Recommendations: Keaton Camilo scored a 15/24 on the AM-PAC ADL Inpatient form. Current research shows that an AM-PAC score of 17 or less is typically not associated with a discharge to the patient's home setting. Based on the patient's AM-PAC score and their current ADL deficits, it is recommended that the patient have 3-5 sessions per week of Occupational Therapy at d/c to increase the patient's independence. Please see assessment section for further patient specific details. If patient discharges prior to next session this note will serve as a discharge summary. Please see below for the latest assessment towards goals. OT Equipment Recommendations  Equipment Needed: Yes  ADL Assistive Devices: Toilet Hygiene Aid    Assessment   Performance deficits / Impairments: Decreased functional mobility ; Decreased ADL status; Decreased endurance;Decreased safe awareness;Decreased posture  Assessment: Pt NWB LLE. RLE BKA. Pt uses w/c for mobility and has a RLE prosthesis at home, typically able to stand pivot and lateral transfer. Pt reportining IND with mobility, transfers, ADL, assist with IADL from roomate. Pt able to complete lateral transfer in prev session EOB to chair with SBA. However mobility appears to be decreased this date, attempting lateral transfer multiple times this date without success. Pt demo increased confusion and difficulty with sequencing for transfers. Pt reporting fatigue from no sleep last night, pt also experiencing persistent cough, increased with attempts at mobility. Multiple attempts to transfer from chair to bed this date, unable to complete, safety concerns with transfer and OOB this date. Rec rosi lift for transfers while in acute care.   Anticipate pt will have improved mobility when NWB status is lifted and with access to prosthesis for pivot transfers on RLE. Pt would benefit from cont skilled OT services to increase safety and IND with fxl mob, transfers, ADL. Will follow. Treatment Diagnosis: decreased mobility, transfers  Prognosis: Good  OT Education: Plan of Care;OT Role;Transfer Training  Patient Education: education on toileting aid for pericare pt concerns, verb understanding  REQUIRES OT FOLLOW UP: Yes  Activity Tolerance  Activity Tolerance: Patient Tolerated treatment well;Patient limited by fatigue  Safety Devices  Safety Devices in place: Yes  Type of devices: All fall risk precautions in place;Gait belt;Patient at risk for falls;Call light within reach; Chair alarm in place; Left in chair;Nurse notified           Patient Diagnosis(es): The primary encounter diagnosis was Dehiscence of operative wound, initial encounter. Diagnoses of Hx of diabetic neuropathy and Critical lower limb ischemia were also pertinent to this visit. has a past medical history of CAD (coronary artery disease), Cerebral artery occlusion with cerebral infarction ([facility] [address] and Diabetes mellitus ([facility] [address] has a past surgical history that includes Coronary artery bypass graft; Leg amputation below knee (Right); Foot Amputation (Left, 3/4/2021); and Achilles tendon surgery (Left, 3/4/2021).     Treatment Diagnosis: decreased mobility, transfers      Restrictions  Position Activity Restriction  Other position/activity restrictions: up as tolerated, NWB LLE    Subjective   General  Chart Reviewed: Yes  Patient assessed for rehabilitation services?: Yes  Additional Pertinent Hx: 68-year-old gentleman with a significant history of diabetes mellitus type 1, PAD, CAD, right BKA, left foot transmetatarsal amputation 5 weeks ago at  sutures were removed 3 weeks ago came into ER with a concern for wound dehiscence  Referring Practitioner: Marjan Gaines MD  Diagnosis: wound infection  Subjective  Subjective: pt in chair upon arrival, agreeable to therapy session, coughing throughout  Vital Signs  Temp: 99.6 °F (37.6 °C)  Temp Source: Oral  Pulse: 99  Heart Rate Source: Monitor  Resp: 18  BP: 138/67  BP Location: Left upper arm  Patient Position: Sitting  Level of Consciousness: Alert (0)  MEWS Score: 1  Oxygen Therapy  SpO2: 94 %  O2 Device: Nasal cannula  O2 Flow Rate (L/min): 2 L/min  Social/Functional History  Social/Functional History  Lives With: Other (comment)(newly devorced, unable to stay with family, living in nursing home- left d/t poor care, left and currently staying with roomate)  Type of Home: Trailer  Home Layout: One level  Home Access: Ramped entrance  Bathroom Shower/Tub: Tub/Shower unit  Bathroom Toilet: Standard  Bathroom Equipment: Tub transfer bench  Home Equipment: Kishan Lean  ADL Assistance: Independent  Homemaking Assistance: Needs assistance(groceries delivered, completes cooking, occassional cleaning, pt folds laundry but cannot get into laundry room)  Homemaking Responsibilities: Yes(share with roomate)  Ambulation Assistance: Independent(with w/c)  Transfer Assistance: Independent  Active : No  Patient's  Info: roommate  Additional Comments: reporting a fall 2-3 months ago, has RLE prosthetic leg       Objective        Orientation  Overall Orientation Status: Within Functional Limits     Balance  Sitting Balance: Stand by assistance  Standing Balance: Unable to assess(comment)  Standing Balance  Comment: NWB LLE no standing this date, attempt transfer from chair to bed x3 usuccessful. RLE prosthesis not present in hospital to use for pivot. Pt with increased confusion and decreased sequencing this date. Pt reporting to therapist rolling from  chair<>bed, unable to safely demo. Attempt lateral transfer from prev session, unsuccessful, unsafe to attempt this date.   ADL  Feeding: Supervision  Grooming: Supervision;Setup(wipe face with cloth)  Additional Comments: pt limited particpation this date d/t persistent cough        Bed mobility  Comment: pt in chair start and end of session        Cognition  Overall Cognitive Status: [facility]  Cognition Comment: ~WFL but pt somewhat confused this date. Difficulty with sequencing and problem solving. Pt reporting extreme fatigue and lack of sleep.                  LUE AROM (degrees)  LUE AROM : WFL  Left Hand AROM (degrees)  Left Hand AROM: WFL  RUE AROM (degrees)  RUE AROM : WFL  Right Hand AROM (degrees)  Right Hand AROM: WFL  LUE Strength  Gross LUE Strength: WFL  RUE Strength  Gross RUE Strength: WFL                   Plan   Plan  Times per week: 2-5  Times per day: Daily      AM-PAC Score        AM-PAC Inpatient Daily Activity Raw Score: 15 (03/05/21 1332)  AM-PAC Inpatient ADL T-Scale Score : 34.69 (03/05/21 1332)  ADL Inpatient CMS 0-100% Score: 56.46 (03/05/21 1332)  ADL Inpatient CMS G-Code Modifier : CK (03/05/21 1332)    Goals  Short term goals  Time Frame for Short term goals: d/c  Short term goal 1: pt will be SBA with lateral transfer to chair<>bed  Short term goal 2: pt will be spvn with grooming tasks in seated  Patient Goals   Patient goals : not stated       Therapy Time   Individual Concurrent Group Co-treatment   Time In 1004         Time Out 1044         Minutes 40           Timed Code Treatment Minutes:  32  Total Treatment Minutes:  [address], MOT, OTR/L

## 2021-03-05 NOTE — PROGRESS NOTES
Patient requesting pain meds for foot. Messaged dr Kristie Forrester. Order for oxycodone placed. Patient medicated per mar.  Will continue to monitor

## 2021-03-06 ENCOUNTER — APPOINTMENT (OUTPATIENT)
Dept: GENERAL RADIOLOGY | Age: 61
DRG: 474 | End: 2021-03-06
Payer: MEDICARE

## 2021-03-06 LAB
ANION GAP SERPL CALCULATED.3IONS-SCNC: 8 MMOL/L (ref 3–16)
BASOPHILS ABSOLUTE: 0.1 K/UL (ref 0–0.2)
BASOPHILS RELATIVE PERCENT: 0.5 %
BUN BLDV-MCNC: 18 MG/DL (ref 7–20)
CALCIUM SERPL-MCNC: 8.2 MG/DL (ref 8.3–10.6)
CHLORIDE BLD-SCNC: 99 MMOL/L (ref 99–110)
CO2: 25 MMOL/L (ref 21–32)
CREAT SERPL-MCNC: 1.2 MG/DL (ref 0.8–1.3)
EOSINOPHILS ABSOLUTE: 0.4 K/UL (ref 0–0.6)
EOSINOPHILS RELATIVE PERCENT: 3.6 %
GFR AFRICAN AMERICAN: >60
GFR NON-AFRICAN AMERICAN: >60
GLUCOSE BLD-MCNC: 160 MG/DL (ref 70–99)
GLUCOSE BLD-MCNC: 186 MG/DL (ref 70–99)
GLUCOSE BLD-MCNC: 226 MG/DL (ref 70–99)
GLUCOSE BLD-MCNC: 73 MG/DL (ref 70–99)
GLUCOSE BLD-MCNC: 74 MG/DL (ref 70–99)
GRAM STAIN RESULT: ABNORMAL
HCT VFR BLD CALC: 29.4 % (ref 40.5–52.5)
HEMOGLOBIN: 9.6 G/DL (ref 13.5–17.5)
LYMPHOCYTES ABSOLUTE: 1.4 K/UL (ref 1–5.1)
LYMPHOCYTES RELATIVE PERCENT: 14.4 %
MCH RBC QN AUTO: 28 PG (ref 26–34)
MCHC RBC AUTO-ENTMCNC: 32.5 G/DL (ref 31–36)
MCV RBC AUTO: 86.2 FL (ref 80–100)
MONOCYTES ABSOLUTE: 0.9 K/UL (ref 0–1.3)
MONOCYTES RELATIVE PERCENT: 8.6 %
NEUTROPHILS ABSOLUTE: 7.3 K/UL (ref 1.7–7.7)
NEUTROPHILS RELATIVE PERCENT: 72.9 %
ORGANISM: ABNORMAL
ORGANISM: ABNORMAL
PDW BLD-RTO: 15.1 % (ref 12.4–15.4)
PERFORMED ON: ABNORMAL
PERFORMED ON: NORMAL
PLATELET # BLD: 214 K/UL (ref 135–450)
PMV BLD AUTO: 7.5 FL (ref 5–10.5)
POTASSIUM SERPL-SCNC: 3.6 MMOL/L (ref 3.5–5.1)
PRO-BNP: 1627 PG/ML (ref 0–124)
RBC # BLD: 3.41 M/UL (ref 4.2–5.9)
SODIUM BLD-SCNC: 132 MMOL/L (ref 136–145)
WBC # BLD: 9.9 K/UL (ref 4–11)
WOUND/ABSCESS: ABNORMAL
WOUND/ABSCESS: ABNORMAL

## 2021-03-06 PROCEDURE — 83880 ASSAY OF NATRIURETIC PEPTIDE: CPT

## 2021-03-06 PROCEDURE — 6370000000 HC RX 637 (ALT 250 FOR IP): Performed by: PODIATRIST

## 2021-03-06 PROCEDURE — 2580000003 HC RX 258: Performed by: PODIATRIST

## 2021-03-06 PROCEDURE — 85025 COMPLETE CBC W/AUTO DIFF WBC: CPT

## 2021-03-06 PROCEDURE — 2580000003 HC RX 258: Performed by: INTERNAL MEDICINE

## 2021-03-06 PROCEDURE — 74018 RADEX ABDOMEN 1 VIEW: CPT

## 2021-03-06 PROCEDURE — 80048 BASIC METABOLIC PNL TOTAL CA: CPT

## 2021-03-06 PROCEDURE — 71045 X-RAY EXAM CHEST 1 VIEW: CPT

## 2021-03-06 PROCEDURE — 6360000002 HC RX W HCPCS: Performed by: INTERNAL MEDICINE

## 2021-03-06 PROCEDURE — 6360000002 HC RX W HCPCS: Performed by: PODIATRIST

## 2021-03-06 PROCEDURE — 36415 COLL VENOUS BLD VENIPUNCTURE: CPT

## 2021-03-06 PROCEDURE — 6370000000 HC RX 637 (ALT 250 FOR IP): Performed by: INTERNAL MEDICINE

## 2021-03-06 PROCEDURE — 6370000000 HC RX 637 (ALT 250 FOR IP): Performed by: STUDENT IN AN ORGANIZED HEALTH CARE EDUCATION/TRAINING PROGRAM

## 2021-03-06 PROCEDURE — 2060000000 HC ICU INTERMEDIATE R&B

## 2021-03-06 RX ORDER — FUROSEMIDE 10 MG/ML
40 INJECTION INTRAMUSCULAR; INTRAVENOUS ONCE
Status: COMPLETED | OUTPATIENT
Start: 2021-03-07 | End: 2021-03-07

## 2021-03-06 RX ORDER — DEXTROSE MONOHYDRATE 25 G/50ML
12.5 INJECTION, SOLUTION INTRAVENOUS PRN
Status: DISCONTINUED | OUTPATIENT
Start: 2021-03-06 | End: 2021-03-11 | Stop reason: HOSPADM

## 2021-03-06 RX ORDER — DEXTROSE MONOHYDRATE 50 MG/ML
100 INJECTION, SOLUTION INTRAVENOUS PRN
Status: DISCONTINUED | OUTPATIENT
Start: 2021-03-06 | End: 2021-03-11 | Stop reason: HOSPADM

## 2021-03-06 RX ORDER — NICOTINE POLACRILEX 4 MG
15 LOZENGE BUCCAL PRN
Status: DISCONTINUED | OUTPATIENT
Start: 2021-03-06 | End: 2021-03-11 | Stop reason: HOSPADM

## 2021-03-06 RX ADMIN — Medication 10 ML: at 09:46

## 2021-03-06 RX ADMIN — MEROPENEM 1000 MG: 1 INJECTION, POWDER, FOR SOLUTION INTRAVENOUS at 20:10

## 2021-03-06 RX ADMIN — ATORVASTATIN CALCIUM 20 MG: 20 TABLET, FILM COATED ORAL at 20:09

## 2021-03-06 RX ADMIN — PRAMIPEXOLE DIHYDROCHLORIDE 0.5 MG: 0.25 TABLET ORAL at 20:09

## 2021-03-06 RX ADMIN — LOSARTAN POTASSIUM 25 MG: 25 TABLET, FILM COATED ORAL at 09:45

## 2021-03-06 RX ADMIN — ASPIRIN 81 MG: 81 TABLET, FILM COATED ORAL at 09:45

## 2021-03-06 RX ADMIN — GUAIFENESIN 600 MG: 600 TABLET, EXTENDED RELEASE ORAL at 09:45

## 2021-03-06 RX ADMIN — TAMSULOSIN HYDROCHLORIDE 0.8 MG: 0.4 CAPSULE ORAL at 09:44

## 2021-03-06 RX ADMIN — GABAPENTIN 300 MG: 300 CAPSULE ORAL at 20:09

## 2021-03-06 RX ADMIN — POLYETHYLENE GLYCOL 3350 17 G: 17 POWDER, FOR SOLUTION ORAL at 13:15

## 2021-03-06 RX ADMIN — ENOXAPARIN SODIUM 40 MG: 40 INJECTION SUBCUTANEOUS at 09:45

## 2021-03-06 RX ADMIN — GUAIFENESIN 600 MG: 600 TABLET, EXTENDED RELEASE ORAL at 20:10

## 2021-03-06 RX ADMIN — FUROSEMIDE 40 MG: 40 TABLET ORAL at 17:35

## 2021-03-06 RX ADMIN — OXYCODONE HYDROCHLORIDE AND ACETAMINOPHEN 1 TABLET: 5; 325 TABLET ORAL at 09:50

## 2021-03-06 RX ADMIN — Medication 10 ML: at 20:10

## 2021-03-06 RX ADMIN — DULOXETINE HYDROCHLORIDE 90 MG: 60 CAPSULE, DELAYED RELEASE ORAL at 09:45

## 2021-03-06 RX ADMIN — GABAPENTIN 300 MG: 300 CAPSULE ORAL at 13:15

## 2021-03-06 RX ADMIN — MEROPENEM 1000 MG: 1 INJECTION, POWDER, FOR SOLUTION INTRAVENOUS at 11:54

## 2021-03-06 RX ADMIN — CHOLESTYRAMINE 4 G: 4 POWDER, FOR SUSPENSION ORAL at 09:44

## 2021-03-06 RX ADMIN — PANTOPRAZOLE SODIUM 40 MG: 40 TABLET, DELAYED RELEASE ORAL at 20:10

## 2021-03-06 RX ADMIN — FUROSEMIDE 40 MG: 40 TABLET ORAL at 09:50

## 2021-03-06 RX ADMIN — GABAPENTIN 300 MG: 300 CAPSULE ORAL at 09:45

## 2021-03-06 RX ADMIN — MEROPENEM 1000 MG: 1 INJECTION, POWDER, FOR SOLUTION INTRAVENOUS at 03:31

## 2021-03-06 RX ADMIN — INSULIN LISPRO 1 UNITS: 100 INJECTION, SOLUTION INTRAVENOUS; SUBCUTANEOUS at 20:08

## 2021-03-06 RX ADMIN — Medication 5 MG: at 21:53

## 2021-03-06 RX ADMIN — PANTOPRAZOLE SODIUM 40 MG: 40 TABLET, DELAYED RELEASE ORAL at 09:45

## 2021-03-06 RX ADMIN — METOPROLOL SUCCINATE 12.5 MG: 25 TABLET, EXTENDED RELEASE ORAL at 09:44

## 2021-03-06 RX ADMIN — CLOPIDOGREL BISULFATE 75 MG: 75 TABLET, FILM COATED ORAL at 09:45

## 2021-03-06 RX ADMIN — INSULIN LISPRO 1 UNITS: 100 INJECTION, SOLUTION INTRAVENOUS; SUBCUTANEOUS at 17:32

## 2021-03-06 RX ADMIN — METOPROLOL SUCCINATE 12.5 MG: 25 TABLET, EXTENDED RELEASE ORAL at 20:10

## 2021-03-06 RX ADMIN — INSULIN LISPRO 1 UNITS: 100 INJECTION, SOLUTION INTRAVENOUS; SUBCUTANEOUS at 11:54

## 2021-03-06 ASSESSMENT — PAIN DESCRIPTION - PAIN TYPE: TYPE: ACUTE PAIN;SURGICAL PAIN

## 2021-03-06 ASSESSMENT — PAIN - FUNCTIONAL ASSESSMENT: PAIN_FUNCTIONAL_ASSESSMENT: PREVENTS OR INTERFERES SOME ACTIVE ACTIVITIES AND ADLS

## 2021-03-06 ASSESSMENT — PAIN DESCRIPTION - ONSET: ONSET: ON-GOING

## 2021-03-06 ASSESSMENT — PAIN DESCRIPTION - ORIENTATION: ORIENTATION: LEFT

## 2021-03-06 ASSESSMENT — PAIN DESCRIPTION - PROGRESSION: CLINICAL_PROGRESSION: GRADUALLY WORSENING

## 2021-03-06 ASSESSMENT — PAIN DESCRIPTION - FREQUENCY: FREQUENCY: INTERMITTENT

## 2021-03-06 ASSESSMENT — PAIN SCALES - GENERAL
PAINLEVEL_OUTOF10: 4
PAINLEVEL_OUTOF10: 0

## 2021-03-06 ASSESSMENT — PAIN DESCRIPTION - DESCRIPTORS: DESCRIPTORS: THROBBING

## 2021-03-06 ASSESSMENT — PAIN DESCRIPTION - LOCATION: LOCATION: FOOT

## 2021-03-06 NOTE — PROGRESS NOTES
Podiatric Surgery Daily Progress Note  Stephen Fish      Subjective :   Patient seen and examined at his reclining chair this a.m. with attending present. Patient denies N/V/F/C/SOB. Patient denies calf pain, thigh pain, chest pain. Review of Systems: A 12 point review of symptoms is unremarkable with the exception of the chief complaint. Patient specifically denies nausea, fever, vomiting, chills, shortness of breath, chest pain, abdominal pain, constipation or difficulty urinating. Objective     BP (!) 115/57   Pulse 93   Temp 99.7 °F (37.6 °C) (Oral)   Resp 18   Ht 5' 6\" (1.676 m)   Wt 274 lb 4 oz (124.4 kg)   SpO2 91%   BMI 44.27 kg/m²     I/O:    Intake/Output Summary (Last 24 hours) at 3/6/2021 0721  Last data filed at 3/6/2021 0622  Gross per 24 hour   Intake 420 ml   Output 1175 ml   Net -755 ml              Wt Readings from Last 3 Encounters:   03/05/21 274 lb 4 oz (124.4 kg)       LABS:    Recent Labs     03/04/21  0033 03/05/21  0614   WBC 8.0 9.4   HGB 11.5* 9.7*   HCT 36.0* 29.8*    217        Recent Labs     03/05/21  0614      K 4.2      CO2 27   BUN 26*   CREATININE 1.5*        Recent Labs     03/03/21  0938   INR 1.01           FOCUSED LEFT LOWER EXTREMITY EXAMINATION:    Dressing to left lower extremity clean, dry, intact. Patient is able to move his ankle up and down without any pain or complications. IMAGING:  Narrative   XR FOOT LEFT (2 VIEWS)       Indication: assess for underlying gas formation at recent amputation site        COMPARISON: None       Findings: AP and lateral views of the left foot were obtained. Transmetatarsal amputation of all digits is noted. There is overlying packing material at the surgical site.       Vascular calcification is noted. There is no comparison study to immediate postoperative radiographs or presurgical appearance of the foot.  No discrete soft tissue gas.       If concerned for ongoing infection, MRI without and with contrast is recommend along with clinical correlation with lab parameters.            Impression   Impression: Limited study, as above. No comparisons are available. ASSESSMENT/PLAN   S/P Revision of Transmetatarsal amputation, tendo-achilles lengthening, delayed primary closure; left LE (DOS: 3/4/21)  Peripheral arterial disease  History of BKA; RLE  History of TMA; LLE  Type 1 DM with peripheral neuropathy    -Patient examined and evaluated at bedside   -Slightly hypotensive, slightly febrile 99.7 °F, no leukocytosis noted.  -All imaging reviewed, see impression above.  -Wound culture- enterobacter cloacae, proteus mirabilis. -Surgical path pending, will f/u  -Laser perfusion study to be done as outpatient to determine need for further vascular intervention  -Dressing change deferred today and will keep clean, dry, and intact. -ID following, PICC line placed with infusion orders Invanz 1 g daily through 3/31  -Nonweightbearing to left lower extremity  -Please resume anticoagulation per Dr. Marylen Rue recommendations. DISPO: S/P TMA, JONATHAN, with delayed primary closure; continue antibiotics per ID recommendations; Surgical pathology pending; no further surgical intervention and patient is okay for discharge from podiatry standpoint.   Patient will follow up with Dr. Jonas Seals 1 week after being discharged from the hospital.    Patient examined and evaluated at bedside with Dr. Timothy Phoenix resident, PGY 2  [phone] or [employer]

## 2021-03-06 NOTE — PROGRESS NOTES
Hospitalist Progress Note      PCP: Arnulfo Bain DO    Date of Admission: 3/1/2021    Chief Complaint: Wound dehiscence    Hospital Course: 65-year-old gentleman with a significant history of diabetes mellitus type 1, PAD, CAD, right BKA, left foot transmetatarsal amputation 5 weeks ago at  sutures were removed 3 weeks ago came into ER with a concern for wound dehiscence   - S/p peripheral diagnostic angiogram on 3/2 showed 99% stenosis of left anterior tibial artery with subtotal occlusion of the left distal dorsal pedis artery. - S/p successful AT/anterior plantar artery angioplasty on 3/3  - SP TRANSMETATARSAL AMPUTATION WITH DELAYED PRIMARY CLOSURE, TENDOACHILLES LENGTHENING . LEFT LOWER EXTREMITY 3/4    Subjective: . Some leg pain, having cough feels very congested. Short of breath on minimal exertion.   Feels tired fatigued and wants to go back to sleep    Medications:  Reviewed    Infusion Medications     Scheduled Medications    clopidogrel  75 mg Oral Daily    guaiFENesin  600 mg Oral BID    meropenem  1,000 mg Intravenous Q8H    sodium chloride flush  10 mL Intravenous 2 times per day    furosemide  40 mg Oral BID    enoxaparin  40 mg Subcutaneous Daily    sodium chloride flush  10 mL Intravenous 2 times per day    melatonin  5 mg Oral Nightly    aspirin  81 mg Oral Daily    cholestyramine light  4 g Oral Daily    DULoxetine  90 mg Oral Daily    gabapentin  300 mg Oral TID    insulin glargine  70 Units Subcutaneous BID    metoprolol succinate  12.5 mg Oral BID    losartan  25 mg Oral Daily    pantoprazole  40 mg Oral BID    pramipexole  0.5 mg Oral Nightly    atorvastatin  20 mg Oral Nightly    tamsulosin  0.8 mg Oral Daily    insulin lispro  0-6 Units Subcutaneous TID WC    insulin lispro  0-3 Units Subcutaneous Nightly    sodium chloride flush  10 mL Intravenous 2 times per day    insulin lispro (1 Unit Dial)  40 Units Subcutaneous TID AC     PRN Meds: oxyCODONE-acetaminophen **OR** oxyCODONE-acetaminophen, sodium chloride flush, benzocaine-menthol, acetaminophen, ipratropium-albuterol, sodium chloride flush, promethazine **OR** ondansetron, polyethylene glycol, acetaminophen **OR** acetaminophen      Intake/Output Summary (Last 24 hours) at 3/6/2021 0920  Last data filed at 3/6/2021 0622  Gross per 24 hour   Intake 420 ml   Output 1175 ml   Net -755 ml       Physical Exam Performed:    BP (!) 115/57   Pulse 93   Temp 99.7 °F (37.6 °C) (Oral)   Resp 18   Ht 5' 6\" (1.676 m)   Wt 274 lb 4 oz (124.4 kg)   SpO2 91%   BMI 44.27 kg/m²     General appearance: NAD, morbidly obese male  HEENT: Pupils equal, round, and reactive to light. Conjunctivae/corneas clear. Neck: Supple, with full range of motion. No jugular venous distention. Trachea midline. Respiratory: Bilateral significant rhonchorous breathing sounds, no wheeze. Cardiovascular: Regular rate and rhythm with normal S1/S2 without murmurs, rubs or gallops. Abdomen: Soft, non-tender, non-distended with normal bowel sounds. Musculoskeletal: Right AKA covered with dressing, left foot metatarsal amputation covered with dressing no obvious sign of discharge. Pictures from podiatry note reviewed  Bilateral upper and lower extremity edema  Neurologic:  Neurovascularly intact without any focal sensory/motor deficits.  Cranial nerves: II-XII intact, grossly non-focal.  Psychiatric: Alert and oriented, thought content appropriate, normal insight  Capillary Refill: Brisk,< 3 seconds   Peripheral Pulses: +2 palpable, equal bilaterally       Labs:   Recent Labs     03/04/21  0033 03/05/21 0614 03/06/21  0644   WBC 8.0 9.4 9.9   HGB 11.5* 9.7* 9.6*   HCT 36.0* 29.8* 29.4*    217 214     Recent Labs     03/04/21  0033 03/05/21  0614 03/06/21  0644    137 132*   K 4.2 4.2 3.6    103 99   CO2 23 27 25   BUN 25* 26* 18   CREATININE 1.3 1.5* 1.2   CALCIUM 8.4 8.2* 8.2*     No results for input(s): AST, ALT, BILIDIR, BILITOT, ALKPHOS in the last 72 hours. Recent Labs     03/03/21  0938   INR 1.01     No results for input(s): Alvino Hooper in the last 72 hours. Urinalysis:    No results found for: Shira Ficks, BACTERIA, RBCUA, BLOODU, SPECGRAV, [id number]    Radiology:  XR FOOT LEFT (MIN 3 VIEWS)   Final Result      Further amputation of the metatarsals without complicating features. XR CHEST (2 VW)   Final Result      Cardiomegaly. Sternotomy wires are noted. Clear lungs. XR FOOT LEFT (2 VIEWS)   Final Result   Impression: Limited study, as above. No comparisons are available. IR ANGIOGRAM EXTREMITY LEFT    (Results Pending)   IR ANGIOGRAM EXTREMITY LEFT    (Results Pending)   LASER SKIN PERFUSION PRESSURE STUDY    (Results Pending)           Assessment/Plan:    Active Hospital Problems    Diagnosis Date Noted    Open wound of left foot [S91.302A] 03/02/2021    Wound dehiscence, surgical Pepe Buffy 03/02/2021    Rupture of operation wound [T81.31XA]     Wound infection [T14. 8XXA, L08.9] 03/01/2021     #Postoperative hypoxia  Multifactorial volume overload, atelectasis  Encourage use of incentive spirometer. Hold IV fluids. Wean oxygen to keep SaO2 greater than 90%. Last echo 4/2019, normal left ventricular cavity size, systolic function normal 60 to 65% EF, normal RV size and function. Continue Lasix 40 mg twice daily  Likely needs IV Lasix for further diuresis  We will check proBNP  X-ray chest ordered for further evaluation  We will follow-up results    #Left lower extremity wound dehiscence  #Critical limb ischemia   S/p left transmetatarsal amputation 5 weeks sutures were removed  3 weeks ago. Per pathology report clean margins were obtained. S/p peripheral diagnostic angiogram on 3/2 showed 99% stenosis of left anterior tibial artery with subtotal occlusion of the left distal dorsal pedis artery. .  S/p successful AT/anterior plantar artery angioplasty on 3/3  asa 81 /plavix 75 mg /Xarelto 2.5mg po bid   S/p TRANSMETATARSAL AMPUTATION WITH DELAYED PRIMARY CLOSURE, TENDOACHILLES LENGTHENING. LEFT LOWER EXTREMITY 3/4 follow-up on pathology  Wound cultures heavy growth of Proteus mirabilis  Infectious disease  Due to limb threatening nature of pt's situation, will recommend iv antibiotic to lower risk dehiscence, need for further revision or limb loss. INFUSION ORDERS:  Invanz 1 gm iv daily through 3/31  - First dose given in hospital  - PICC   - Disposition / date discharge  - Check CBC w diff, CMP, ESR, CRP every Mon or Tue - FAX result to [phone]  - Call with antibiotic / infusion issues, [phone]  - If facility - call with any change in status, transfer out of facility or to hospital - [phone]  - No f/u in outpatient ID office necessary. F/u Podiatry, interventional Card after discharge     #Constipation  Says he has had not had a bowel movement for 4 to 5 days  Feel very backed up, we will check KUB for evaluation bowel gas pattern  But opiate related constipation as well as patient says he has gastroparesis    #Diabetes mellitus type 1  Hypoglycemia due to decreased p.o. intake  I will change Lantus to 5 units twice daily, stop scheduled 40 units of Humalog  Continue sliding-scale insulin  Monitor blood glucose closely  Hypoglycemia protocol  Point-of-care glucose checks    #CAD hx of CABG  Continue losartan, metoprolol, Lasix continue statin    Morbid obesity due to excess calories Body mass index is 44.27 kg/m². - Complicating assessment and treatment. Placing patient at risk for multiple co-morbidities as well as early death and contributing to the patient's presentation.  on weight loss when appropriate. DVT Prophylaxis: lovenox  Diet: DIET CARB CONTROL;  Code Status: Full Code    PT/OT Eval Status: 6/24 on the AM-PAC short mobility form    Dispo - inpatient likely d/c in 2-3 days.  Per Podiatry, Surgical pathology pending; no further surgical intervention and patient is okay for discharge from podiatry standpoint. Patient will follow up with Dr. Mercedez Grubbs 1 week after being discharged from the hospital.  Per CM -- Received response from [facility] and [facility], they can accept patient on d/c.     This chart was likely completed using voice recognition technology and may contain unintended grammatical , phraseology,and/or punctuation errors    Time spent in care of this patient exceeds 45 minutes    Catrachita Rao MD  Hospitalist

## 2021-03-06 NOTE — PLAN OF CARE
Problem: Falls - Risk of:  Goal: Will remain free from falls  Description: Will remain free from falls  3/6/2021 0027 by Fatoumata Weldon RN  Outcome: Ongoing     Problem: Falls - Risk of:  Goal: Absence of physical injury  Description: Absence of physical injury  Outcome: Ongoing     Problem: Skin Integrity:  Goal: Will show no infection signs and symptoms  Description: Will show no infection signs and symptoms  3/6/2021 0027 by Fatoumata Weldon RN  Outcome: Ongoing     Problem: Skin Integrity:  Goal: Absence of new skin breakdown  Description: Absence of new skin breakdown  Outcome: Ongoing     Problem: Pain:  Goal: Pain level will decrease  Description: Pain level will decrease  3/6/2021 0027 by Fatoumata Weldon RN  Outcome: Ongoing     Problem: Pain:  Goal: Control of acute pain  Description: Control of acute pain  Outcome: Ongoing     Problem: Pain:  Goal: Control of chronic pain  Description: Control of chronic pain  Outcome: Ongoing

## 2021-03-06 NOTE — PROGRESS NOTES
Patient alert and oriented times four. Patient vss this shift. Patient on Telemetry in NSR. Patient iv is in left hand. Patient on bedrest. Patient uses the urinal. patient has no complaints. Patient adbominal sounds active. Patient has surgical site to left foot and surgical site to right leg. Skin otherwise is clean dry and intact. Patient breath sounds full of rhonci. Patient bed locked in lowest position with bed alarm on. Call light bedside table and belongings in reach. Patient encouraged to call with any and all needs. Patient verbalizes understanding and call appropriately.  Will continue to monitor.

## 2021-03-06 NOTE — CARE COORDINATION
Discussed SNF again today with patient. He has not discussed with daughter. Discussed [place] vs. SNF in [place] area closer to family. List of facilities in [place] in network with insurance reviewed again with patient. Patient states he will discuss with daughter.  Electronically signed by RENETTA Hill LISW-S on 3/6/2021 at 1:18 PM

## 2021-03-07 LAB
ANION GAP SERPL CALCULATED.3IONS-SCNC: 8 MMOL/L (ref 3–16)
BASOPHILS ABSOLUTE: 0.1 K/UL (ref 0–0.2)
BASOPHILS RELATIVE PERCENT: 0.7 %
BUN BLDV-MCNC: 16 MG/DL (ref 7–20)
CALCIUM SERPL-MCNC: 8.3 MG/DL (ref 8.3–10.6)
CHLORIDE BLD-SCNC: 99 MMOL/L (ref 99–110)
CO2: 27 MMOL/L (ref 21–32)
CREAT SERPL-MCNC: 1 MG/DL (ref 0.8–1.3)
EOSINOPHILS ABSOLUTE: 0.4 K/UL (ref 0–0.6)
EOSINOPHILS RELATIVE PERCENT: 4.7 %
GFR AFRICAN AMERICAN: >60
GFR NON-AFRICAN AMERICAN: >60
GLUCOSE BLD-MCNC: 253 MG/DL (ref 70–99)
GLUCOSE BLD-MCNC: 275 MG/DL (ref 70–99)
GLUCOSE BLD-MCNC: 315 MG/DL (ref 70–99)
GLUCOSE BLD-MCNC: 435 MG/DL (ref 70–99)
GLUCOSE BLD-MCNC: 437 MG/DL (ref 70–99)
HCT VFR BLD CALC: 27.3 % (ref 40.5–52.5)
HEMOGLOBIN: 9.1 G/DL (ref 13.5–17.5)
LYMPHOCYTES ABSOLUTE: 1.1 K/UL (ref 1–5.1)
LYMPHOCYTES RELATIVE PERCENT: 15 %
MCH RBC QN AUTO: 28.3 PG (ref 26–34)
MCHC RBC AUTO-ENTMCNC: 33.2 G/DL (ref 31–36)
MCV RBC AUTO: 85.4 FL (ref 80–100)
MONOCYTES ABSOLUTE: 0.6 K/UL (ref 0–1.3)
MONOCYTES RELATIVE PERCENT: 8.2 %
NEUTROPHILS ABSOLUTE: 5.4 K/UL (ref 1.7–7.7)
NEUTROPHILS RELATIVE PERCENT: 71.4 %
PDW BLD-RTO: 15.3 % (ref 12.4–15.4)
PERFORMED ON: ABNORMAL
PLATELET # BLD: 209 K/UL (ref 135–450)
PMV BLD AUTO: 7.5 FL (ref 5–10.5)
POTASSIUM SERPL-SCNC: 3.9 MMOL/L (ref 3.5–5.1)
PROCALCITONIN: 0.15 NG/ML (ref 0–0.15)
RBC # BLD: 3.2 M/UL (ref 4.2–5.9)
SODIUM BLD-SCNC: 134 MMOL/L (ref 136–145)
WBC # BLD: 7.5 K/UL (ref 4–11)

## 2021-03-07 PROCEDURE — 6370000000 HC RX 637 (ALT 250 FOR IP): Performed by: PODIATRIST

## 2021-03-07 PROCEDURE — 6360000002 HC RX W HCPCS: Performed by: INTERNAL MEDICINE

## 2021-03-07 PROCEDURE — 2060000000 HC ICU INTERMEDIATE R&B

## 2021-03-07 PROCEDURE — 6370000000 HC RX 637 (ALT 250 FOR IP): Performed by: INTERNAL MEDICINE

## 2021-03-07 PROCEDURE — 2580000003 HC RX 258: Performed by: PODIATRIST

## 2021-03-07 PROCEDURE — 2580000003 HC RX 258: Performed by: INTERNAL MEDICINE

## 2021-03-07 PROCEDURE — 80048 BASIC METABOLIC PNL TOTAL CA: CPT

## 2021-03-07 PROCEDURE — 85025 COMPLETE CBC W/AUTO DIFF WBC: CPT

## 2021-03-07 PROCEDURE — 84145 PROCALCITONIN (PCT): CPT

## 2021-03-07 PROCEDURE — 6360000002 HC RX W HCPCS: Performed by: PODIATRIST

## 2021-03-07 RX ORDER — INSULIN LISPRO 100 [IU]/ML
0-12 INJECTION, SOLUTION INTRAVENOUS; SUBCUTANEOUS
Status: DISCONTINUED | OUTPATIENT
Start: 2021-03-07 | End: 2021-03-11 | Stop reason: HOSPADM

## 2021-03-07 RX ORDER — INSULIN LISPRO 100 [IU]/ML
0-6 INJECTION, SOLUTION INTRAVENOUS; SUBCUTANEOUS NIGHTLY
Status: DISCONTINUED | OUTPATIENT
Start: 2021-03-07 | End: 2021-03-11 | Stop reason: HOSPADM

## 2021-03-07 RX ORDER — INSULIN LISPRO 100 [IU]/ML
10 INJECTION, SOLUTION INTRAVENOUS; SUBCUTANEOUS ONCE
Status: COMPLETED | OUTPATIENT
Start: 2021-03-07 | End: 2021-03-07

## 2021-03-07 RX ORDER — FUROSEMIDE 10 MG/ML
40 INJECTION INTRAMUSCULAR; INTRAVENOUS ONCE
Status: COMPLETED | OUTPATIENT
Start: 2021-03-07 | End: 2021-03-07

## 2021-03-07 RX ADMIN — GUAIFENESIN 600 MG: 600 TABLET, EXTENDED RELEASE ORAL at 20:28

## 2021-03-07 RX ADMIN — PANTOPRAZOLE SODIUM 40 MG: 40 TABLET, DELAYED RELEASE ORAL at 20:28

## 2021-03-07 RX ADMIN — Medication 5 MG: at 21:50

## 2021-03-07 RX ADMIN — PRAMIPEXOLE DIHYDROCHLORIDE 0.5 MG: 0.25 TABLET ORAL at 20:27

## 2021-03-07 RX ADMIN — FUROSEMIDE 40 MG: 10 INJECTION, SOLUTION INTRAMUSCULAR; INTRAVENOUS at 08:13

## 2021-03-07 RX ADMIN — LOSARTAN POTASSIUM 25 MG: 25 TABLET, FILM COATED ORAL at 08:09

## 2021-03-07 RX ADMIN — INSULIN LISPRO 3 UNITS: 100 INJECTION, SOLUTION INTRAVENOUS; SUBCUTANEOUS at 08:26

## 2021-03-07 RX ADMIN — GABAPENTIN 300 MG: 300 CAPSULE ORAL at 14:19

## 2021-03-07 RX ADMIN — METOPROLOL SUCCINATE 12.5 MG: 25 TABLET, EXTENDED RELEASE ORAL at 08:08

## 2021-03-07 RX ADMIN — INSULIN LISPRO 12 UNITS: 100 INJECTION, SOLUTION INTRAVENOUS; SUBCUTANEOUS at 17:45

## 2021-03-07 RX ADMIN — Medication 10 ML: at 20:58

## 2021-03-07 RX ADMIN — DULOXETINE HYDROCHLORIDE 90 MG: 60 CAPSULE, DELAYED RELEASE ORAL at 08:08

## 2021-03-07 RX ADMIN — MEROPENEM 1000 MG: 1 INJECTION, POWDER, FOR SOLUTION INTRAVENOUS at 12:06

## 2021-03-07 RX ADMIN — INSULIN LISPRO 4 UNITS: 100 INJECTION, SOLUTION INTRAVENOUS; SUBCUTANEOUS at 12:10

## 2021-03-07 RX ADMIN — PANTOPRAZOLE SODIUM 40 MG: 40 TABLET, DELAYED RELEASE ORAL at 08:08

## 2021-03-07 RX ADMIN — Medication 10 ML: at 08:13

## 2021-03-07 RX ADMIN — INSULIN LISPRO 10 UNITS: 100 INJECTION, SOLUTION INTRAVENOUS; SUBCUTANEOUS at 17:45

## 2021-03-07 RX ADMIN — ATORVASTATIN CALCIUM 20 MG: 20 TABLET, FILM COATED ORAL at 20:28

## 2021-03-07 RX ADMIN — CLOPIDOGREL BISULFATE 75 MG: 75 TABLET, FILM COATED ORAL at 08:08

## 2021-03-07 RX ADMIN — FUROSEMIDE 40 MG: 10 INJECTION, SOLUTION INTRAMUSCULAR; INTRAVENOUS at 15:54

## 2021-03-07 RX ADMIN — GABAPENTIN 300 MG: 300 CAPSULE ORAL at 20:27

## 2021-03-07 RX ADMIN — Medication 10 ML: at 20:28

## 2021-03-07 RX ADMIN — ENOXAPARIN SODIUM 40 MG: 40 INJECTION SUBCUTANEOUS at 08:08

## 2021-03-07 RX ADMIN — MEROPENEM 1000 MG: 1 INJECTION, POWDER, FOR SOLUTION INTRAVENOUS at 04:06

## 2021-03-07 RX ADMIN — GABAPENTIN 300 MG: 300 CAPSULE ORAL at 08:09

## 2021-03-07 RX ADMIN — GUAIFENESIN 600 MG: 600 TABLET, EXTENDED RELEASE ORAL at 08:09

## 2021-03-07 RX ADMIN — ASPIRIN 81 MG: 81 TABLET, FILM COATED ORAL at 08:08

## 2021-03-07 RX ADMIN — CHOLESTYRAMINE 4 G: 4 POWDER, FOR SUSPENSION ORAL at 08:08

## 2021-03-07 RX ADMIN — INSULIN LISPRO 6 UNITS: 100 INJECTION, SOLUTION INTRAVENOUS; SUBCUTANEOUS at 20:31

## 2021-03-07 RX ADMIN — MEROPENEM 1000 MG: 1 INJECTION, POWDER, FOR SOLUTION INTRAVENOUS at 20:27

## 2021-03-07 RX ADMIN — TAMSULOSIN HYDROCHLORIDE 0.8 MG: 0.4 CAPSULE ORAL at 08:08

## 2021-03-07 ASSESSMENT — PAIN SCALES - GENERAL
PAINLEVEL_OUTOF10: 0

## 2021-03-07 NOTE — PROGRESS NOTES
Blood sugar 435 prior to dinner. Notified on call hospitalist, Dr. Mayra Gutiérrez and asked for additional insulin if needed.

## 2021-03-07 NOTE — PLAN OF CARE
Problem: Falls - Risk of:  Goal: Will remain free from falls  Note: PT/OT consulted. Alert and oriented x4 and calls appropriately for assistance. Right BKA and NWB to LLE. Pt aware of limitations and follows safety precautions. Bed locked in lowest position. 3/4 Bed rails up. Call light within reach. Pt belongings within reach. Bedside table within reach. Pt instructed to use call light prior to getting up. Will continue to monitor. Problem: Skin Integrity:  Goal: Will show no infection signs and symptoms  Note: Redness to coccyx and buttocks. Zinc cream applied. Pt able to reposition self in chair. Pt refuse to get out of chair and into bed during this shift. Dry, flaky skin with scattered abrasions.  Will continue to monitor for any new s/s of breakdown

## 2021-03-07 NOTE — PROGRESS NOTES
Podiatric Surgery Daily Progress Note  Stephen Fish      Subjective :   Patient seen and examined at his reclining chair this a.m. wearing his PAP mask. Patient denies N/V/F/C/SOB. Patient denies calf pain, thigh pain, chest pain. Patient feeling well this a.m. and is ready to go to a skilled nursing facility. Review of Systems: A 10 point review of symptoms is unremarkable with the exception of the chief complaint. Patient specifically denies nausea, fever, vomiting, chills, shortness of breath, chest pain, abdominal pain, constipation or difficulty urinating. Objective     BP (!) 147/70   Pulse 81   Temp 98.2 °F (36.8 °C) (Oral)   Resp 18   Ht 5' 6\" (1.676 m)   Wt 274 lb 4 oz (124.4 kg)   SpO2 91%   BMI 44.27 kg/m²     I/O:    Intake/Output Summary (Last 24 hours) at 3/7/2021 0642  Last data filed at 3/6/2021 2010  Gross per 24 hour   Intake 640 ml   Output 1375 ml   Net -735 ml              Wt Readings from Last 3 Encounters:   03/05/21 274 lb 4 oz (124.4 kg)       LABS:    Recent Labs     03/06/21  0644 03/07/21  0607   WBC 9.9 7.5   HGB 9.6* 9.1*   HCT 29.4* 27.3*    209        Recent Labs     03/06/21  0644   *   K 3.6   CL 99   CO2 25   BUN 18   CREATININE 1.2        No results for input(s): PROT, INR, APTT in the last 72 hours. FOCUSED LEFT LOWER EXTREMITY EXAMINATION:    Dressing to left lower extremity clean, dry, intact. Patient is able to move his ankle up and down without any pain or complications. IMAGING:  Narrative   XR FOOT LEFT (2 VIEWS)       Indication: assess for underlying gas formation at recent amputation site        COMPARISON: None       Findings: AP and lateral views of the left foot were obtained. Transmetatarsal amputation of all digits is noted. There is overlying packing material at the surgical site.       Vascular calcification is noted. There is no comparison study to immediate postoperative radiographs or presurgical appearance of the foot. No discrete soft tissue gas.       If concerned for ongoing infection, MRI without and with contrast is recommend along with clinical correlation with lab parameters.            Impression   Impression: Limited study, as above. No comparisons are available. ASSESSMENT/PLAN  S/P Revision of Transmetatarsal amputation, tendo-achilles lengthening, delayed primary closure; left LE (DOS: 3/4/21)  Peripheral arterial disease  History of BKA; RLE  History of TMA; LLE  Type 1 DM with peripheral neuropathy    -Patient examined and evaluated at bedside this AM.  -Hypertensive, afebrile, no leukocytosis noted.  -All imaging reviewed, see impression above.  -Wound culture- enterobacter cloacae, proteus mirabilis. -Surgical path pending, will f/u  -Laser perfusion study to be done as outpatient to determine need for further vascular intervention.  -Dressing change deferred today and will keep clean, dry, and intact. Will change prior to discharge. -ID following, PICC line placed with infusion orders Invanz 1 g daily through 3/31  -Nonweightbearing to left lower extremity.  -Please resume anticoagulation per Dr. Bach Sensor recommendations. DISPO: S/P TMA, JONATHAN, with delayed primary closure; continue antibiotics per ID recommendations; Surgical pathology pending; no further surgical intervention and patient is okay for discharge from podiatry standpoint.   Patient will follow up with Dr. Rick Arevalo 1 week after being discharged from the hospital.    Patient examined and evaluated at bedside with Dr. Nafisa Nunez resident, PGY 2  [phone] or loli carpenter

## 2021-03-07 NOTE — PROGRESS NOTES
Podiatric Surgery Daily Progress Note  (Agree w Resident Note)   Stephen Fish      Subjective :   Patient seen and examined at his reclining chair this a.m. wearing his PAP mask. Patient denies N/V/F/C/SOB. Patient denies calf pain, thigh pain, chest pain. Patient feeling well this a.m. and is ready to go to a skilled nursing facility. Review of Systems: A 10 point review of symptoms is unremarkable with the exception of the chief complaint. Patient specifically denies nausea, fever, vomiting, chills, shortness of breath, chest pain, abdominal pain, constipation or difficulty urinating. Objective     BP (!) 174/73   Pulse 87   Temp 98.5 °F (36.9 °C) (Oral)   Resp 18   Ht 5' 6\" (1.676 m)   Wt 274 lb 4 oz (124.4 kg)   SpO2 94%   BMI 44.27 kg/m²     I/O:    Intake/Output Summary (Last 24 hours) at 3/7/2021 0927  Last data filed at 3/7/2021 0824  Gross per 24 hour   Intake 240 ml   Output 2325 ml   Net -2085 ml              Wt Readings from Last 3 Encounters:   03/05/21 274 lb 4 oz (124.4 kg)       LABS:    Recent Labs     03/06/21  0644 03/07/21  0607   WBC 9.9 7.5   HGB 9.6* 9.1*   HCT 29.4* 27.3*    209        Recent Labs     03/07/21  0607   *   K 3.9   CL 99   CO2 27   BUN 16   CREATININE 1.0        No results for input(s): PROT, INR, APTT in the last 72 hours. FOCUSED LEFT LOWER EXTREMITY EXAMINATION:    Dressing to left lower extremity clean, dry, intact. Patient is able to move his ankle up and down without any pain or complications. IMAGING:  Narrative   XR FOOT LEFT (2 VIEWS)       Indication: assess for underlying gas formation at recent amputation site        COMPARISON: None       Findings: AP and lateral views of the left foot were obtained. Transmetatarsal amputation of all digits is noted. There is overlying packing material at the surgical site.       Vascular calcification is noted.  There is no comparison study to immediate postoperative radiographs or presurgical appearance of the foot. No discrete soft tissue gas.       If concerned for ongoing infection, MRI without and with contrast is recommend along with clinical correlation with lab parameters.            Impression   Impression: Limited study, as above. No comparisons are available. ASSESSMENT/PLAN  S/P Revision of Transmetatarsal amputation, tendo-achilles lengthening, delayed primary closure; left LE (DOS: 3/4/21)  Peripheral arterial disease  History of BKA; RLE  History of TMA; LLE  Type 1 DM with peripheral neuropathy  Hx of Diabetic Ulceration left     -Patient examined and evaluated at bedside this AM.  -Hypertensive, afebrile, no leukocytosis noted.  -All imaging reviewed, see impression above.  -Wound culture- enterobacter cloacae, proteus mirabilis. -Surgical path pending, will f/u  -Laser perfusion study to be done as outpatient to determine need for further vascular intervention.  -Dressing change deferred today and will keep clean, dry, and intact. Will change prior to discharge. -ID following, PICC line placed with infusion orders Invanz 1 g daily through 3/31  -Nonweightbearing to left lower extremity.  -Please resume anticoagulation per Dr. Alexa Byrne recommendations. DISPO: S/P TMA, JONATHAN, with delayed primary closure; continue antibiotics per ID recommendations; Surgical pathology pending; no further surgical intervention and patient is okay for discharge from podiatry standpoint.   Patient will follow up with Dr. Sathish Mcmillan 1 week after being discharged from the hospital.    Patient examined and evaluated     Ruma Spencer DPM

## 2021-03-07 NOTE — PROGRESS NOTES
Podiatric Surgery Daily Progress Note  (Agree w Resident Note)   Stephen Fish      Subjective :   Patient seen and examined at his reclining chair this a.m. with attending present. Patient denies N/V/F/C/SOB. Patient denies calf pain, thigh pain, chest pain. Review of Systems: A 12 point review of symptoms is unremarkable with the exception of the chief complaint. Patient specifically denies nausea, fever, vomiting, chills, shortness of breath, chest pain, abdominal pain, constipation or difficulty urinating. Objective     BP (!) 174/73   Pulse 87   Temp 98.5 °F (36.9 °C) (Oral)   Resp 18   Ht 5' 6\" (1.676 m)   Wt 274 lb 4 oz (124.4 kg)   SpO2 94%   BMI 44.27 kg/m²     I/O:    Intake/Output Summary (Last 24 hours) at 3/7/2021 0925  Last data filed at 3/7/2021 0824  Gross per 24 hour   Intake 240 ml   Output 2325 ml   Net -2085 ml              Wt Readings from Last 3 Encounters:   03/05/21 274 lb 4 oz (124.4 kg)       LABS:    Recent Labs     03/06/21  0644 03/07/21  0607   WBC 9.9 7.5   HGB 9.6* 9.1*   HCT 29.4* 27.3*    209        Recent Labs     03/07/21  0607   *   K 3.9   CL 99   CO2 27   BUN 16   CREATININE 1.0        No results for input(s): PROT, INR, APTT in the last 72 hours. FOCUSED LEFT LOWER EXTREMITY EXAMINATION:    Dressing to left lower extremity clean, dry, intact. Patient is able to move his ankle up and down without any pain or complications. IMAGING:  Narrative   XR FOOT LEFT (2 VIEWS)       Indication: assess for underlying gas formation at recent amputation site        COMPARISON: None       Findings: AP and lateral views of the left foot were obtained. Transmetatarsal amputation of all digits is noted. There is overlying packing material at the surgical site.       Vascular calcification is noted. There is no comparison study to immediate postoperative radiographs or presurgical appearance of the foot.  No discrete soft tissue gas.       If concerned for ongoing infection, MRI without and with contrast is recommend along with clinical correlation with lab parameters.            Impression   Impression: Limited study, as above. No comparisons are available. ASSESSMENT/PLAN  S/P Revision of Transmetatarsal amputation, tendo-achilles lengthening, delayed primary closure; left LE (DOS: 3/4/21)  Peripheral arterial disease  History of BKA; RLE  History of TMA; LLE  Type 1 DM with peripheral neuropathy  Hx of Diabetic Ulceration left foot    -Patient examined and evaluated at bedside   -Slightly hypotensive, slightly febrile 99.7 °F, no leukocytosis noted.  -All imaging reviewed, see impression above.  -Wound culture- enterobacter cloacae, proteus mirabilis. -Surgical path pending, will f/u  -Laser perfusion study to be done as outpatient to determine need for further vascular intervention  -Dressing change deferred today and will keep clean, dry, and intact. -ID following, PICC line placed with infusion orders Invanz 1 g daily through 3/31  -Nonweightbearing to left lower extremity  -Please resume anticoagulation per Dr. Maycol Rodrigues recommendations. DISPO: S/P TMA, JONATHAN, with delayed primary closure; continue antibiotics per ID recommendations; Surgical pathology pending; no further surgical intervention and patient is okay for discharge from podiatry standpoint.   Patient will follow up with Dr. Linda Luther 1 week after being discharged from the hospital.    Patient examined and evaluated     Liss Kidd DPM

## 2021-03-08 ENCOUNTER — TELEPHONE (OUTPATIENT)
Dept: CARDIOLOGY CLINIC | Age: 61
End: 2021-03-08

## 2021-03-08 DIAGNOSIS — Z98.890 CRITICAL LIMB ISCHEMIA WITH HISTORY OF REVASCULARIZATION OF SAME EXTREMITY (HCC): ICD-10-CM

## 2021-03-08 DIAGNOSIS — I70.229 CRITICAL LIMB ISCHEMIA WITH HISTORY OF REVASCULARIZATION OF SAME EXTREMITY (HCC): ICD-10-CM

## 2021-03-08 DIAGNOSIS — I73.9 PAD (PERIPHERAL ARTERY DISEASE) (HCC): Primary | ICD-10-CM

## 2021-03-08 LAB
ANION GAP SERPL CALCULATED.3IONS-SCNC: 9 MMOL/L (ref 3–16)
BASOPHILS ABSOLUTE: 0.1 K/UL (ref 0–0.2)
BASOPHILS RELATIVE PERCENT: 0.7 %
BUN BLDV-MCNC: 20 MG/DL (ref 7–20)
CALCIUM SERPL-MCNC: 8.4 MG/DL (ref 8.3–10.6)
CHLORIDE BLD-SCNC: 94 MMOL/L (ref 99–110)
CO2: 28 MMOL/L (ref 21–32)
CREAT SERPL-MCNC: 1.2 MG/DL (ref 0.8–1.3)
EOSINOPHILS ABSOLUTE: 0.5 K/UL (ref 0–0.6)
EOSINOPHILS RELATIVE PERCENT: 6.1 %
GFR AFRICAN AMERICAN: >60
GFR NON-AFRICAN AMERICAN: >60
GLUCOSE BLD-MCNC: 361 MG/DL (ref 70–99)
GLUCOSE BLD-MCNC: 403 MG/DL (ref 70–99)
GLUCOSE BLD-MCNC: 416 MG/DL (ref 70–99)
GLUCOSE BLD-MCNC: 434 MG/DL (ref 70–99)
GLUCOSE BLD-MCNC: 464 MG/DL (ref 70–99)
HCT VFR BLD CALC: 28.4 % (ref 40.5–52.5)
HEMOGLOBIN: 9.4 G/DL (ref 13.5–17.5)
LYMPHOCYTES ABSOLUTE: 1.1 K/UL (ref 1–5.1)
LYMPHOCYTES RELATIVE PERCENT: 14.6 %
MCH RBC QN AUTO: 28.1 PG (ref 26–34)
MCHC RBC AUTO-ENTMCNC: 33.1 G/DL (ref 31–36)
MCV RBC AUTO: 85.1 FL (ref 80–100)
MONOCYTES ABSOLUTE: 0.5 K/UL (ref 0–1.3)
MONOCYTES RELATIVE PERCENT: 7.3 %
NEUTROPHILS ABSOLUTE: 5.3 K/UL (ref 1.7–7.7)
NEUTROPHILS RELATIVE PERCENT: 71.3 %
PDW BLD-RTO: 14.7 % (ref 12.4–15.4)
PERFORMED ON: ABNORMAL
PLATELET # BLD: 259 K/UL (ref 135–450)
PMV BLD AUTO: 7.5 FL (ref 5–10.5)
POTASSIUM SERPL-SCNC: 4.1 MMOL/L (ref 3.5–5.1)
RBC # BLD: 3.34 M/UL (ref 4.2–5.9)
SARS-COV-2, NAAT: NOT DETECTED
SODIUM BLD-SCNC: 131 MMOL/L (ref 136–145)
WBC # BLD: 7.4 K/UL (ref 4–11)

## 2021-03-08 PROCEDURE — 6370000000 HC RX 637 (ALT 250 FOR IP): Performed by: PODIATRIST

## 2021-03-08 PROCEDURE — 2580000003 HC RX 258: Performed by: PODIATRIST

## 2021-03-08 PROCEDURE — 2060000000 HC ICU INTERMEDIATE R&B

## 2021-03-08 PROCEDURE — 6360000002 HC RX W HCPCS: Performed by: INTERNAL MEDICINE

## 2021-03-08 PROCEDURE — 99232 SBSQ HOSP IP/OBS MODERATE 35: CPT | Performed by: INTERNAL MEDICINE

## 2021-03-08 PROCEDURE — 6370000000 HC RX 637 (ALT 250 FOR IP): Performed by: INTERNAL MEDICINE

## 2021-03-08 PROCEDURE — 2580000003 HC RX 258: Performed by: INTERNAL MEDICINE

## 2021-03-08 PROCEDURE — 87635 SARS-COV-2 COVID-19 AMP PRB: CPT

## 2021-03-08 PROCEDURE — 85025 COMPLETE CBC W/AUTO DIFF WBC: CPT

## 2021-03-08 PROCEDURE — 6360000002 HC RX W HCPCS: Performed by: PODIATRIST

## 2021-03-08 PROCEDURE — 94664 DEMO&/EVAL PT USE INHALER: CPT

## 2021-03-08 PROCEDURE — 80048 BASIC METABOLIC PNL TOTAL CA: CPT

## 2021-03-08 RX ORDER — FUROSEMIDE 10 MG/ML
40 INJECTION INTRAMUSCULAR; INTRAVENOUS 2 TIMES DAILY
Status: DISCONTINUED | OUTPATIENT
Start: 2021-03-08 | End: 2021-03-09

## 2021-03-08 RX ORDER — CLOPIDOGREL BISULFATE 75 MG/1
75 TABLET ORAL DAILY
Qty: 30 TABLET | Refills: 3 | Status: SHIPPED | OUTPATIENT
Start: 2021-03-09

## 2021-03-08 RX ORDER — OXYCODONE HYDROCHLORIDE AND ACETAMINOPHEN 5; 325 MG/1; MG/1
1 TABLET ORAL EVERY 4 HOURS PRN
Qty: 20 TABLET | Refills: 0 | Status: SHIPPED | OUTPATIENT
Start: 2021-03-08 | End: 2021-03-13

## 2021-03-08 RX ADMIN — GABAPENTIN 300 MG: 300 CAPSULE ORAL at 09:13

## 2021-03-08 RX ADMIN — INSULIN LISPRO 6 UNITS: 100 INJECTION, SOLUTION INTRAVENOUS; SUBCUTANEOUS at 20:35

## 2021-03-08 RX ADMIN — CHOLESTYRAMINE 4 G: 4 POWDER, FOR SUSPENSION ORAL at 09:13

## 2021-03-08 RX ADMIN — METOPROLOL SUCCINATE 12.5 MG: 25 TABLET, EXTENDED RELEASE ORAL at 20:21

## 2021-03-08 RX ADMIN — ASPIRIN 81 MG: 81 TABLET, FILM COATED ORAL at 09:12

## 2021-03-08 RX ADMIN — INSULIN LISPRO 12 UNITS: 100 INJECTION, SOLUTION INTRAVENOUS; SUBCUTANEOUS at 17:30

## 2021-03-08 RX ADMIN — Medication 10 ML: at 20:34

## 2021-03-08 RX ADMIN — PANTOPRAZOLE SODIUM 40 MG: 40 TABLET, DELAYED RELEASE ORAL at 09:14

## 2021-03-08 RX ADMIN — ENOXAPARIN SODIUM 40 MG: 40 INJECTION SUBCUTANEOUS at 09:01

## 2021-03-08 RX ADMIN — INSULIN LISPRO 10 UNITS: 100 INJECTION, SOLUTION INTRAVENOUS; SUBCUTANEOUS at 08:58

## 2021-03-08 RX ADMIN — DULOXETINE HYDROCHLORIDE 90 MG: 60 CAPSULE, DELAYED RELEASE ORAL at 09:13

## 2021-03-08 RX ADMIN — GABAPENTIN 300 MG: 300 CAPSULE ORAL at 14:10

## 2021-03-08 RX ADMIN — MEROPENEM 1000 MG: 1 INJECTION, POWDER, FOR SOLUTION INTRAVENOUS at 20:26

## 2021-03-08 RX ADMIN — TAMSULOSIN HYDROCHLORIDE 0.8 MG: 0.4 CAPSULE ORAL at 09:14

## 2021-03-08 RX ADMIN — Medication 10 ML: at 20:33

## 2021-03-08 RX ADMIN — INSULIN GLARGINE 30 UNITS: 100 INJECTION, SOLUTION SUBCUTANEOUS at 09:56

## 2021-03-08 RX ADMIN — METOPROLOL SUCCINATE 12.5 MG: 25 TABLET, EXTENDED RELEASE ORAL at 09:14

## 2021-03-08 RX ADMIN — CLOPIDOGREL BISULFATE 75 MG: 75 TABLET, FILM COATED ORAL at 09:13

## 2021-03-08 RX ADMIN — LOSARTAN POTASSIUM 25 MG: 25 TABLET, FILM COATED ORAL at 09:13

## 2021-03-08 RX ADMIN — Medication 5 MG: at 20:20

## 2021-03-08 RX ADMIN — GUAIFENESIN 600 MG: 600 TABLET, EXTENDED RELEASE ORAL at 20:33

## 2021-03-08 RX ADMIN — FUROSEMIDE 40 MG: 10 INJECTION, SOLUTION INTRAMUSCULAR; INTRAVENOUS at 09:53

## 2021-03-08 RX ADMIN — GABAPENTIN 300 MG: 300 CAPSULE ORAL at 20:21

## 2021-03-08 RX ADMIN — Medication 10 ML: at 09:56

## 2021-03-08 RX ADMIN — INSULIN LISPRO 12 UNITS: 100 INJECTION, SOLUTION INTRAVENOUS; SUBCUTANEOUS at 12:51

## 2021-03-08 RX ADMIN — PANTOPRAZOLE SODIUM 40 MG: 40 TABLET, DELAYED RELEASE ORAL at 20:20

## 2021-03-08 RX ADMIN — GUAIFENESIN 600 MG: 600 TABLET, EXTENDED RELEASE ORAL at 09:13

## 2021-03-08 RX ADMIN — MEROPENEM 1000 MG: 1 INJECTION, POWDER, FOR SOLUTION INTRAVENOUS at 03:25

## 2021-03-08 RX ADMIN — PRAMIPEXOLE DIHYDROCHLORIDE 0.5 MG: 0.25 TABLET ORAL at 20:21

## 2021-03-08 RX ADMIN — MEROPENEM 1000 MG: 1 INJECTION, POWDER, FOR SOLUTION INTRAVENOUS at 11:35

## 2021-03-08 RX ADMIN — ATORVASTATIN CALCIUM 20 MG: 20 TABLET, FILM COATED ORAL at 20:21

## 2021-03-08 RX ADMIN — FUROSEMIDE 40 MG: 10 INJECTION, SOLUTION INTRAMUSCULAR; INTRAVENOUS at 17:46

## 2021-03-08 ASSESSMENT — PAIN DESCRIPTION - PROGRESSION
CLINICAL_PROGRESSION: GRADUALLY IMPROVING

## 2021-03-08 ASSESSMENT — PAIN DESCRIPTION - PAIN TYPE: TYPE: ACUTE PAIN;SURGICAL PAIN

## 2021-03-08 ASSESSMENT — PAIN DESCRIPTION - LOCATION: LOCATION: FOOT

## 2021-03-08 ASSESSMENT — PAIN DESCRIPTION - FREQUENCY: FREQUENCY: INTERMITTENT

## 2021-03-08 ASSESSMENT — PAIN - FUNCTIONAL ASSESSMENT: PAIN_FUNCTIONAL_ASSESSMENT: PREVENTS OR INTERFERES SOME ACTIVE ACTIVITIES AND ADLS

## 2021-03-08 ASSESSMENT — PAIN DESCRIPTION - ORIENTATION: ORIENTATION: LEFT

## 2021-03-08 ASSESSMENT — PAIN SCALES - GENERAL
PAINLEVEL_OUTOF10: 0
PAINLEVEL_OUTOF10: 0

## 2021-03-08 ASSESSMENT — PAIN DESCRIPTION - ONSET: ONSET: ON-GOING

## 2021-03-08 ASSESSMENT — PAIN DESCRIPTION - DESCRIPTORS: DESCRIPTORS: ACHING

## 2021-03-08 NOTE — PROGRESS NOTES
ID Follow-up NOTE  Medical Student note - reviewed and modified, see Attending addendum at bottom    CC:   Dehiscence / infection of L TMA amputation site  Antibiotics: Meropenem    Admit Date: 3/1/2021  Hospital Day: 8    Subjective:     Patient seen this morning resting comfortably in his chair. No complaints, no overnight events    Patient states his weekend went well, without any complications. His one complaint was about the logistics of going to the bathroom, and using a bedpan. He also states his BMs have been very irregular, changing form watery diarrhea to very solid large stools. He attributes this to his gastroparesis and diabetes. Patient had one episode of elevated temperature (100.7) Friday night (3/6) but other wise has remained afebrile. Patient denies any pain currently. States his legs are not bothering him. He denies change in vision, fever, chills, n/v, chest pain, SOB, and any numbness or tingling. Objective:     Patient Vitals for the past 8 hrs:   BP Temp Temp src Pulse Resp SpO2   03/08/21 0323 (!) 153/71 98.2 °F (36.8 °C) Oral 79 18 95 %     I/O last 3 completed shifts: In: 240 [P.O.:240]  Out: 5650 [Urine:5650]  No intake/output data recorded. EXAM:  GENERAL: No apparent distress. HEENT: Membranes moist, no oral lesion  NECK:  Supple, no lymphadenopathy  LUNGS: Clear b/l, no rales, no dullness  CARDIAC: RRR, no murmur appreciated  ABD:  + BS, soft / NT  EXT:  Right BKA well healed; covered with sleeve. Left LE / stump with dressing up to tibial head. (Images in Epic with transmetatarsal amputation dehisced with exposed bone).   NEURO: No focal neurologic findings  PSYCH: Orientation, sensorium, mood normal  LINES:  Peripheral iv       Data Review:  Lab Results   Component Value Date    WBC 7.4 03/08/2021    HGB 9.4 (L) 03/08/2021    HCT 28.4 (L) 03/08/2021    MCV 85.1 03/08/2021     03/08/2021     Lab Results   Component Value Date    CREATININE 1.2 03/08/2021 foot. No discrete soft tissue gas.       If concerned for ongoing infection, MRI without and with contrast is recommend.      3/2 IR Angiogram LEFT lower extremity -   1. abdominal aorta is patent with patent mesenteric's and renal vessels. 2. left common femoral artery profunda femoris is patent. 3.  Left superficial femoral arteries patent with patent left popliteal artery. 4.  Left anterior tibial artery distal 99% stenosis with subtotal occlusion of the distal left dorsalis pedis artery. 5.  Left peroneal artery occludes distally. 6.  Left posterior tibial artery is occluded at the ostium. 7.  Plantar artery is occluded with incomplete plantar loop.     3/3 CXR - Cardiomegaly; Sternotomy wires noted; Clear lungs    3/3 IR Angio LEFT lower extremity    3/4 XR Left Foot - Further amputation of the metatarsals without complicating features    3/6 CXR - Mild diffuse interstitial edema or pneumonia    3/6 XR KUB - Nonspecific bowel gas pattern    Scheduled Meds:   insulin lispro  0-12 Units Subcutaneous TID WC    insulin lispro  0-6 Units Subcutaneous Nightly    insulin glargine  15 Units Subcutaneous BID    clopidogrel  75 mg Oral Daily    guaiFENesin  600 mg Oral BID    meropenem  1,000 mg Intravenous Q8H    sodium chloride flush  10 mL Intravenous 2 times per day    [Held by provider] furosemide  40 mg Oral BID    enoxaparin  40 mg Subcutaneous Daily    sodium chloride flush  10 mL Intravenous 2 times per day    melatonin  5 mg Oral Nightly    aspirin  81 mg Oral Daily    cholestyramine light  4 g Oral Daily    DULoxetine  90 mg Oral Daily    gabapentin  300 mg Oral TID    metoprolol succinate  12.5 mg Oral BID    losartan  25 mg Oral Daily    pantoprazole  40 mg Oral BID    pramipexole  0.5 mg Oral Nightly    atorvastatin  20 mg Oral Nightly    tamsulosin  0.8 mg Oral Daily    sodium chloride flush  10 mL Intravenous 2 times per day       Continuous Infusions:   dextrose         PRN Meds:  glucose, dextrose, glucagon (rDNA), dextrose, oxyCODONE-acetaminophen **OR** oxyCODONE-acetaminophen, sodium chloride flush, benzocaine-menthol, acetaminophen, ipratropium-albuterol, sodium chloride flush, promethazine **OR** ondansetron, polyethylene glycol, acetaminophen **OR** acetaminophen      Assessment:     Stephen Fish is a 63 y. o. male with a PMHx of T1DM, CABG, PAD, right-sided lower extremity amputation, recent left-sided TMA amputations of all 5 toes, presents to the ED with concerns of postoperative complications. ED with concerns of postoperative complications. 3/1 Glucose 358 on admission; No fever (97.5), no leukocytosis (7.8); CRP (38.6), ESR (99) elevated;  Prealbumin low (16.3)      3/2 IR findings - Left anterior tibial artery 99% stenosed; L peroneal and posterior tibial artery occluded; subtotal occlusion of the left distal dorsal pedis. 3/3 Successful AT/anterior plantar artery angioplasty    3/4 Revision of transmetatarsal amputation, with delayed primary closure, tendoachilles lengthening    3/5 No fever or leukocytosis; no systemic signs of infection. 3/5 One reading of elevated temperature Friday evening (100.7), associated with desaturation requiring O2 2L NC    3/8 Remained afebrile through the weekend. No leukocytosis. Not requiring oxygen. Uses CPAP during nights. Glucose has been around 400 for past 24 hours    Left foot post-surgical wound dehiscence w/ no signs of infection s/p balloon angiogram of left lower vessels and s/p left foot TMA & I&D revision. Plan:     Cont. Meropenem; switch to Ertapenem on d/c  Wound cx grew Enterobacter cloacae; Proteus mirabilis (heavy growth)  PICC line placed  Wound care  Plan for d/c to SNF today     Discussed with MD Melvin Nichols MS4    Addendum to Medical Student Progress note:  Pt seen,examined and evaluated. I have independently performed history, physical exam, lab and data review.   I have determined assessment and plan as documented by student Barak Chau).    62 yo DM, neuropathy, PVD, CAD / CABG, R BKA  Hx L LE PVD  Recent L TMA - 1/7/21      Pt had dehiscence of L TMA wound weeks / months ago (pt is vague historian)  No drainage, no pain. High BS     Admit 3/1 -   Seen by Podiatry, Cardiology  Reviewed images of stump wound. Exposed MT 1/2/3 per Podiatry note  Started on vancomycin + cefepime  L LE cath 3/2 with L ant tib a stenosis, L peroneal / post tib a occlusion    3/3 - L LE angio with L ant tib and plantar a balloon angioplasty    3/4 - Revision L TMA with secondary closure     IMP/  Mult co-morbidities including DM, PVD, R BKA  L TMA dehiscence with exposed MT, cult + P mirabilis, E cloacae  PVD    S/p revascularization, TMA revision, closure   Due to limb threatening nature of pt's situation, will recommend iv antibiotic to lower risk dehiscence, need for further revision or limb loss.       REC/  Cont meropenem  Would care per per Podiatry      See JONAH    Discussed with pt, RN  Rodrigo Villalta MD    INFUSION ORDERS:  Invanz 1 gm iv daily through 3/31  - First dose given in hospital  - PICC   - Disposition / date discharge  - Check CBC w diff, CMP, ESR, CRP every Mon or Tue - FAX result to [phone]  - Call with antibiotic / infusion issues, [phone]  - If facility - call with any change in status, transfer out of facility or to hospital - [phone]  - No f/u in outpatient ID office necessary.   F/u Podiatry, interventional Card after discharge

## 2021-03-08 NOTE — PROGRESS NOTES
Physician Progress Note      PATIENTDaalfredo Nelson  CSN #:                  [id number]  :                       1960  ADMIT DATE:       3/1/2021 3:05 PM  [address] [place] DATE:  RESPONDING  PROVIDER #:        Nishant Doyle MD          QUERY TEXT:    Pt admitted with wound dehiscence of LLE transmetatarsal amputation w/   infection, s/p transmetatarsal amputation 3/4. Pt noted to have hypoxia and   fluid overload postoperatively, requiring Lasix. If possible, please document   in progress notes and discharge summary if you are evaluating and/or treating   any of the following: The medical record reflects the following:  Risk Factors: fluid overload postop  Clinical Indicators: 2D ECHO 2613: grade 2 diastolic dysfunction; ECHO   Dec. 2020: EF: 60-65%, Unable to assess  ? diastolic function. 3/6- Pro-BNP: 1627;  3/6- CXR: Mild diffuse interstitial   edema or pneumonia. Per pn \"Postoperative hypoxia  Multifactorial volume overload, atelectasis\"; Unable to find documented hx of   CHF. Treatment: 40 mg IVP Lasix x 2 on 3/7, then scheduled BID on 3/8, Continues on   home Cozaar & Metoprolol  Options provided:  -- Acute on Chronic Diastolic CHF/HFpEF  -- Acute Diastolic CHF/HFpEF  -- Acute Pulmonary edema  -- Other - I will add my own diagnosis  -- Disagree - Not applicable / Not valid  -- Disagree - Clinically unable to determine / Unknown  -- Refer to Clinical Documentation Reviewer    PROVIDER RESPONSE TEXT:    This patient is in diastolic CHF/HFpEF exacerbation. Query created by:  Anthony Wells on 3/8/2021 12:11 PM      Electronically signed by:  Nishant Doyle MD 3/8/2021 1:00 PM

## 2021-03-08 NOTE — PROGRESS NOTES
Podiatric Surgery Daily Progress Note  Stephen Fish      Subjective :   Patient seen and examined at his reclining chair this a.m. with attending present. Patient denies nausea, vomiting, fever, chills, shortness of breath. Patient was annoyed that his blood sugars were high this a.m. and was wondering who to talk to her about changing his insulin regimen. Patient has no pedal complaints during today's examination. Review of Systems: A 10 point review of systems was conducted, significant findings as noted in HPI. All other systems negative. Objective     BP (!) 153/71   Pulse 79   Temp 98.2 °F (36.8 °C) (Oral)   Resp 18   Ht 5' 6\" (1.676 m)   Wt 274 lb 4 oz (124.4 kg)   SpO2 95%   BMI 44.27 kg/m²     I/O:    Intake/Output Summary (Last 24 hours) at 3/8/2021 0555  Last data filed at 3/8/2021 0122  Gross per 24 hour   Intake 240 ml   Output 5650 ml   Net -5410 ml              Wt Readings from Last 3 Encounters:   03/05/21 274 lb 4 oz (124.4 kg)       LABS:    Recent Labs     03/06/21  0644 03/07/21  0607   WBC 9.9 7.5   HGB 9.6* 9.1*   HCT 29.4* 27.3*    209        Recent Labs     03/07/21  0607   *   K 3.9   CL 99   CO2 27   BUN 16   CREATININE 1.0        No results for input(s): PROT, INR, APTT in the last 72 hours. FOCUSED LEFT LOWER EXTREMITY EXAMINATION:    Dressing to the left lower extremity no strikethrough drainage noted to the external dressing layer. Moderate sanguinous drainage noted to the internal dressing layer. VASCULAR: DP and PT pulses nonpalpable. Upon hand-held Doppler monophasic signals noted DP and PT B/L LE. CFT less than 3 seconds to the distal stump. Skin temperature is warm to lukewarm from proximal to distal with no focal calor noted. No edema noted. No pain with calf compression.     NEUROLOGIC: Gross and epicritic sensation is diminished. Protective sensation is diminished at all pedal sites.     DERMATOLOGIC: Dermatological changes noted B/L LE. Surgical incision noted at the distal stump of the left foot with intact sutures and no dehiscence noted. No fluctuance, crepitus, malodor, or drainage noted. Wound appears stable without clinical signs of infection. Erythema 2/2 postoperative surgery. Picture taken on 3/8/2021    Patient gave verbal consent for the picture taken at today's visit. MUSCULOSKELETAL: Muscle strength is 5/5 for all pedal groups tested. No pain with palpation of the foot or ankle. History of BKA to RLE, TMA LLE. IMAGING:  Narrative   XR FOOT LEFT (2 VIEWS)       Indication: assess for underlying gas formation at recent amputation site        COMPARISON: None       Findings: AP and lateral views of the left foot were obtained. Transmetatarsal amputation of all digits is noted. There is overlying packing material at the surgical site.       Vascular calcification is noted. There is no comparison study to immediate postoperative radiographs or presurgical appearance of the foot. No discrete soft tissue gas.       If concerned for ongoing infection, MRI without and with contrast is recommend along with clinical correlation with lab parameters.            Impression   Impression: Limited study, as above. No comparisons are available. ASSESSMENT/PLAN  S/P Revision of Transmetatarsal amputation due to osteomyelitis, tendo-achilles lengthening, delayed primary closure; left LE (DOS: 3/4/21)  Peripheral arterial disease  History of BKA; RLE  History of TMA; LLE  Type 1 DM with peripheral neuropathy      -Patient examined and evaluated at reclining chair this AM.  -Hypertensive, afebrile, no leukocytosis noted.  -All imaging reviewed, see impression above.  -Wound culture- enterobacter cloacae, proteus mirabilis.   -Surgical path pending (3/4), will f/u on results.  -Laser perfusion study to be done as outpatient to determine need for further vascular intervention.  -Dressing change to left lower extremity consisted of

## 2021-03-08 NOTE — PROGRESS NOTES
Hospitalist Progress Note      PCP: Raine Brennan DO    Date of Admission: 3/1/2021    Chief Complaint: Wound dehiscence    Hospital Course: 79-year-old gentleman with a significant history of diabetes mellitus type 1, PAD, CAD, right BKA, left foot transmetatarsal amputation 5 weeks ago at  sutures were removed 3 weeks ago came into ER with a concern for wound dehiscence   - S/p peripheral diagnostic angiogram on 3/2 showed 99% stenosis of left anterior tibial artery with subtotal occlusion of the left distal dorsal pedis artery. - S/p successful AT/anterior plantar artery angioplasty on 3/3  - SP TRANSMETATARSAL AMPUTATION WITH DELAYED PRIMARY CLOSURE, TENDOACHILLES LENGTHENING . LEFT LOWER EXTREMITY 3/4    Subjective: .     Doing better, says water pills are helping but still doesn't feel back to baseline    Medications:  Reviewed    Infusion Medications    dextrose       Scheduled Medications    insulin lispro  0-12 Units Subcutaneous TID WC    insulin lispro  0-6 Units Subcutaneous Nightly    insulin glargine  15 Units Subcutaneous BID    clopidogrel  75 mg Oral Daily    guaiFENesin  600 mg Oral BID    meropenem  1,000 mg Intravenous Q8H    sodium chloride flush  10 mL Intravenous 2 times per day    [Held by provider] furosemide  40 mg Oral BID    enoxaparin  40 mg Subcutaneous Daily    sodium chloride flush  10 mL Intravenous 2 times per day    melatonin  5 mg Oral Nightly    aspirin  81 mg Oral Daily    cholestyramine light  4 g Oral Daily    DULoxetine  90 mg Oral Daily    gabapentin  300 mg Oral TID    metoprolol succinate  12.5 mg Oral BID    losartan  25 mg Oral Daily    pantoprazole  40 mg Oral BID    pramipexole  0.5 mg Oral Nightly    atorvastatin  20 mg Oral Nightly    tamsulosin  0.8 mg Oral Daily    sodium chloride flush  10 mL Intravenous 2 times per day     PRN Meds: glucose, dextrose, glucagon (rDNA), dextrose, oxyCODONE-acetaminophen **OR** oxyCODONE-acetaminophen, sodium chloride flush, benzocaine-menthol, acetaminophen, ipratropium-albuterol, sodium chloride flush, promethazine **OR** ondansetron, polyethylene glycol, acetaminophen **OR** acetaminophen      Intake/Output Summary (Last 24 hours) at 3/7/2021 2221  Last data filed at 3/7/2021 1748  Gross per 24 hour   Intake 240 ml   Output 4350 ml   Net -4110 ml       Physical Exam Performed:    /60   Pulse 93   Temp 97.5 °F (36.4 °C) (Oral)   Resp 18   Ht 5' 6\" (1.676 m)   Wt 274 lb 4 oz (124.4 kg)   SpO2 93%   BMI 44.27 kg/m²     General appearance: NAD, morbidly obese male  HEENT: Pupils equal, round, and reactive to light. Conjunctivae/corneas clear. Neck: Supple, with full range of motion. No jugular venous distention. Trachea midline. Respiratory: Bilateral significant rhonchorous breathing sounds only some interval improvement  Cardiovascular: Regular rate and rhythm with normal S1/S2 without murmurs, rubs or gallops. Abdomen: Soft, non-tender, non-distended with normal bowel sounds. Musculoskeletal: Right AKA covered with dressing, left foot metatarsal amputation covered with dressing no obvious sign of discharge. Pictures from podiatry note reviewed  Bilateral upper and lower extremity edema  Neurologic:  Neurovascularly intact without any focal sensory/motor deficits.  Cranial nerves: II-XII intact, grossly non-focal.  Psychiatric: Alert and oriented, thought content appropriate, normal insight  Capillary Refill: Brisk,< 3 seconds   Peripheral Pulses: +2 palpable, equal bilaterally       Labs:   Recent Labs     03/05/21  0614 03/06/21  0644 03/07/21  0607   WBC 9.4 9.9 7.5   HGB 9.7* 9.6* 9.1*   HCT 29.8* 29.4* 27.3*    214 209     Recent Labs     03/05/21  0614 03/06/21  0644 03/07/21  0607    132* 134*   K 4.2 3.6 3.9    99 99   CO2 27 25 27   BUN 26* 18 16   CREATININE 1.5* 1.2 1.0   CALCIUM 8.2* 8.2* 8.3     No results for input(s): AST, ALT, BILIDIR, BILITOT, ALKPHOS in the last 72 hours. No results for input(s): INR in the last 72 hours. No results for input(s): Usha Ramana in the last 72 hours. Urinalysis:    No results found for: Katie Dose, BACTERIA, RBCUA, BLOODU, SPECGRAV, Pool [address]    Radiology:  XR CHEST PORTABLE   Final Result      Mild diffuse interstitial edema or pneumonia. XR ABDOMEN (KUB) (SINGLE AP VIEW)   Final Result      Nonspecific bowel gas pattern. XR FOOT LEFT (MIN 3 VIEWS)   Final Result      Further amputation of the metatarsals without complicating features. XR CHEST (2 VW)   Final Result      Cardiomegaly. Sternotomy wires are noted. Clear lungs. XR FOOT LEFT (2 VIEWS)   Final Result   Impression: Limited study, as above. No comparisons are available. IR ANGIOGRAM EXTREMITY LEFT    (Results Pending)   IR ANGIOGRAM EXTREMITY LEFT    (Results Pending)   LASER SKIN PERFUSION PRESSURE STUDY    (Results Pending)           Assessment/Plan:    Active Hospital Problems    Diagnosis Date Noted    Open wound of left foot [S91.302A] 03/02/2021    Wound dehiscence, surgical Grayce Ceasar 03/02/2021    Rupture of operation wound [T81.31XA]     Wound infection [T14. 8XXA, L08.9] 03/01/2021     #Postoperative hypoxia  Multifactorial volume overload, atelectasis  Encourage use of incentive spirometer. Hold IV fluids. Wean oxygen to keep SaO2 greater than 90%. Last echo 4/2019, normal left ventricular cavity size, systolic function normal 60 to 65% EF, normal RV size and function. ProBNP elevated, give two doses of IV lasix 40 mg    #Left lower extremity wound dehiscence  #Critical limb ischemia   S/p left transmetatarsal amputation 5 weeks sutures were removed  3 weeks ago. Per pathology report clean margins were obtained. S/p peripheral diagnostic angiogram on 3/2 showed 99% stenosis of left anterior tibial artery with subtotal occlusion of the left distal dorsal pedis artery. .  S/p successful Dr. China Anders 1 week after being discharged from the hospital.  Per CM -- Received response from [facility] and [facility], they can accept patient on d/c.     This chart was likely completed using voice recognition technology and may contain unintended grammatical , phraseology,and/or punctuation errors      Sary Mauricio MD  Hospitalist

## 2021-03-08 NOTE — DISCHARGE SUMMARY
antibiotics to lower the risk of dehiscence, per ID recommendation Invanz 1 g IV daily through 3/21. For type 1 diabetes, I will send him home on prior to admission regimen    CAD s/p CABG  Continue losartan metoprolol Lasix statin    Morbid obesity excess calories  Counseled on weight loss      Physical Exam Performed:     BP (!) 144/63   Pulse 72   Temp 98.1 °F (36.7 °C) (Oral)   Resp 18   Ht 5' 6\" (1.676 m)   Wt 274 lb 4 oz (124.4 kg)   SpO2 94%   BMI 44.27 kg/m²     General appearance: NAD, morbidly obese male  HEENT: Pupils equal, round, and reactive to light. Conjunctivae/corneas clear. Neck: Supple, with full range of motion. No jugular venous distention. Trachea midline. Respiratory: Normal respiratory status, no wheezing or crackles  Cardiovascular: Regular rate and rhythm with normal S1/S2 without murmurs, rubs or gallops. Abdomen: Soft, non-tender, non-distended with normal bowel sounds. Musculoskeletal: Right AKA covered with dressing, left foot metatarsal amputation covered with dressing no obvious sign of discharge. Pictures from podiatry note reviewed  Neurologic:  Neurovascularly intact without any focal sensory/motor deficits. Cranial nerves: II-XII intact, grossly non-focal.  Psychiatric: Alert and oriented, thought content appropriate, normal insight  Capillary Refill: Brisk,< 3 seconds   Peripheral Pulses: +2 palpable, equal bilaterally       Labs:  For convenience and continuity at follow-up the following most recent labs are provided:      CBC:    Lab Results   Component Value Date    WBC 7.3 03/11/2021    HGB 10.2 03/11/2021    HCT 31.6 03/11/2021     03/11/2021       Renal:    Lab Results   Component Value Date     03/11/2021    K 3.9 03/11/2021    K 4.2 03/04/2021    CL 97 03/11/2021    CO2 31 03/11/2021    BUN 13 03/11/2021    CREATININE 1.0 03/11/2021    CALCIUM 8.7 03/11/2021         Significant Diagnostic Studies    Radiology:   XR CHEST PORTABLE   Final Result Mild diffuse interstitial edema or pneumonia. XR ABDOMEN (KUB) (SINGLE AP VIEW)   Final Result      Nonspecific bowel gas pattern. XR FOOT LEFT (MIN 3 VIEWS)   Final Result      Further amputation of the metatarsals without complicating features. XR CHEST (2 VW)   Final Result      Cardiomegaly. Sternotomy wires are noted. Clear lungs. XR FOOT LEFT (2 VIEWS)   Final Result   Impression: Limited study, as above. No comparisons are available. IR ANGIOGRAM EXTREMITY LEFT    (Results Pending)   IR ANGIOGRAM EXTREMITY LEFT    (Results Pending)   LASER SKIN PERFUSION PRESSURE STUDY    (Results Pending)          Consults:     IP CONSULT TO PODIATRY  IP CONSULT TO HOSPITALIST  IP CONSULT TO PODIATRY  IP CONSULT TO PHARMACY  IP CONSULT TO HOSPITALIST  IP CONSULT TO SPIRITUAL SERVICES  PHARMACY TO DOSE VANCOMYCIN  IP CONSULT TO INFECTIOUS DISEASES  IP CONSULT TO CARDIAC REHAB    Disposition:  Long term care facility     Condition at Discharge: Stable    Discharge Instructions/Follow-up:    Carb control diet  Follow-up with podiatry  Invanz 1 g daily until 3/31    Code Status:  Full Code    Activity: activity as tolerated    Diet: diabetic diet      Discharge Medications:     Current Discharge Medication List           Details   clopidogrel (PLAVIX) 75 MG tablet Take 1 tablet by mouth daily  Qty: 30 tablet, Refills: 3      oxyCODONE-acetaminophen (PERCOCET) 5-325 MG per tablet Take 1 tablet by mouth every 4 hours as needed for Pain for up to 5 days.   Qty: 20 tablet, Refills: 0    Comments: Reduce doses taken as pain becomes manageable  Associated Diagnoses: Status post transmetatarsal amputation of foot, left (HCC)              Details   cilostazol (PLETAL) 100 MG tablet Take 100 mg by mouth 2 times daily      colestipol (COLESTID) 1 g tablet Take 1 g by mouth 2 times daily      DULoxetine (CYMBALTA) 30 MG extended release capsule Take 90 mg by mouth daily      furosemide (LASIX) 40 MG tablet Take 40 mg by mouth 2 times daily      gabapentin (NEURONTIN) 300 MG capsule Take 300 mg by mouth 3 times daily. losartan (COZAAR) 25 MG tablet Take 25 mg by mouth daily      metoprolol succinate (TOPROL XL) 25 MG extended release tablet Take 12.5 mg by mouth 2 times daily       pantoprazole (PROTONIX) 40 MG tablet Take 40 mg by mouth 2 times daily      potassium chloride (MICRO-K) 10 MEQ extended release capsule Take 10 mEq by mouth daily      pramipexole (MIRAPEX) 0.5 MG tablet Take 0.5 mg by mouth nightly       simvastatin (ZOCOR) 40 MG tablet Take 40 mg by mouth nightly      tamsulosin (FLOMAX) 0.4 MG capsule Take 0.8 mg by mouth daily      aspirin 81 MG EC tablet Take 81 mg by mouth daily      Misc Natural Products (GLUCOSAMINE CHOND CMP ADVANCED PO) Take 1 tablet by mouth daily      insulin glargine (BASAGLAR KWIKPEN) 100 UNIT/ML injection pen Inject 70 Units into the skin 2 times daily       insulin lispro (HUMALOG) 100 UNIT/ML injection vial Inject 40 Units into the skin 3 times daily (before meals) + sliding scale      melatonin 3 MG TABS tablet Take 3 mg by mouth daily             Time Spent on discharge is more than 30 minutes in the examination, evaluation, counseling and review of medications and discharge plan. Signed:    Viviana Haro MD   3/11/2021      Thank you [facility] for the opportunity to be involved in this patient's care. If you have any questions or concerns please feel free to contact me at [phone].

## 2021-03-08 NOTE — TELEPHONE ENCOUNTER
Notified per Dr. Wilfred Bravo. Patient will need laser doppler in 1 week and then follow up in office afterwards. Orders placed, patient discharged today. Will call to facilitate scheduling doppler and office visit.

## 2021-03-08 NOTE — CARE COORDINATION
CM met with pt. Aware of dc order. Pt would like referrals to [facility] of [address] and [facility] [phone]. His family lives up that way. Pt states there is another facility in [address] [place] [address] which he can find the name of if needed. DAINA spoke with Shavon Rodriguez at [facility]. CM faxed face sheets and notes to [phone]. Explained pt in need of IV abx and PT/OT. She will review and call CM back. CM left message for Yamel Amezcua at [facility]. Faxed face sheet and notes. Asked for return call to see if they can accept pt.     12:40 PM  CM called Shavon Rodriguez at [facility]. She did receive the fax and they will review in w/in the next 30 minutes and will let CM know if they can accept. DAINA called Jackie at [address] [place] [address] Court [phone], left message on cell asking if they have received referral and if they can accept. Asked for return call. 3:42 PM  CM spoke with Shavon Rodriguez at [facility]. They can accept pt. They will need updated PT/OT notes. They will start precert. Fax updated notes to [phone]    CM made pt aware of facility accepting. DAINA spoke with daughter Miguelito Weaver regarding pt's living situation. Pt hopes to transition to [place] after SNF stay. DAINA sent perfectserve to Dr Juaquin Valencia regarding 1st dose Celina Call tomorrow in hospital prior to dc. CM ordered rapid COVID for placement. CM spoke with OT, made aware of need for updated PT/OT notes for precert. They will attempt to get a PT to see pt as a team.     DAINA updated, nurse, Loly Broussard.

## 2021-03-08 NOTE — PROGRESS NOTES
Pt has had 2 blood sugars >400 today. Dr. Vamshi Aguilar notified. Wants to continue to monitor since high dose of lantus was given.

## 2021-03-08 NOTE — PROGRESS NOTES
RESPIRATORY THERAPY ASSESSMENT    Name:  Andrew Rivers  Medical Record Number:  [id number]  Age: 63 y.o. Gender: male  : 1960  Today's Date:  3/8/2021  Room:  [facility]/6321-01    Assessment     Is the patient being admitted for a COPD or Asthma exacerbation? No   (If yes the patient will be seen every 4 hours for the first 24 hours and then reassessed)    Patient Admission Diagnosis      Allergies  No Known Allergies        Pulmonary History:No history  Home Oxygen Therapy:  room air  Home Respiratory Therapy:None   Current Respiratory Therapy:  duoneb HHN PRN  Treatment Type: IS  Medications: Albuterol/Ipratropium    Respiratory Severity Index(RSI)   Patients with orders for inhalation medications, oxygen, or any therapeutic treatment modality will be placed on Respiratory Protocol. They will be assessed with the first treatment and at least every 72 hours thereafter. The following severity scale will be used to determine frequency of treatment intervention. Smoking History: No Smoking History = 0    Social History  Social History     Tobacco Use    Smoking status: Never Smoker    Smokeless tobacco: Never Used   Substance Use Topics    Alcohol use: Not Currently    Drug use: Never       Recent Surgical History: Surgery of Extremities = 1  Past Surgical History  Past Surgical History:   Procedure Laterality Date    ACHILLES TENDON SURGERY Left 3/4/2021    . performed by Daniele Angel DPM at [address] Left 3/4/2021    TRANSMETATARSAL AMPUTATION WITH DELAYED PRIMARY CLOSURE, TENDOACHILLES LENGTHENING performed by Daniele Angel DPM at [address] Right        Level of Consciousness: Alert, Oriented, and Cooperative = 0    Level of Activity: Walking with assistance = 1    Respiratory Pattern: Regular Pattern; RR 8-20 = 0    Breath Sounds: Clear = 0    Sputum  Sputum Color: Bright red, Tenacity:  Thick, Sputum How Obtained: Spontaneous cough  Cough: Strong, spontaneous, non-productive = 0    Vital Signs   BP (!) 153/71   Pulse 79   Temp 98.2 °F (36.8 °C) (Oral)   Resp 18   Ht 5' 6\" (1.676 m)   Wt 274 lb 4 oz (124.4 kg)   SpO2 95%   BMI 44.27 kg/m²   SPO2 (COPD values may differ): Greater than or equal to 92% on room air = 0    Peak Flow (asthma only): not applicable = 0    RSI: 0-4 = See once and convert to home regimen or discontinue        Plan       Goals: medication delivery    Patient/caregiver was educated on the proper method of use for Respiratory Care Devices:  Yes      Level of patient/caregiver understanding able to:   ? Verbalize understanding   ? Demonstrate understanding       ? Teach back        ? Needs reinforcement       ? No available caregiver               ? Other:     Response to education:  Good     Is patient being placed on Home Treatment Regimen? No     Does the patient have everything they need prior to discharge? NA     Comments: chart reviewed    Plan of Care: duoneb PRN    Electronically signed by Shantal Lua RCP on 3/8/2021 at 4:51 AM    Respiratory Protocol Guidelines     1. Assessment and treatment by Respiratory Therapy will be initiated for medication and therapeutic interventions upon initiation of aerosolized medication. 2. Physician will be contacted for respiratory rate (RR) greater than 35 breaths per minute. Therapy will be held for heart rate (HR) greater than 140 beats per minute, pending direction from physician. 3. Bronchodilators will be administered via Metered Dose Inhaler (MDI) with spacer when the following criteria are met:  a. Alert and cooperative     b. HR < 140 bpm  c. RR < 30 bpm                d. Can demonstrate a 2-3 second inspiratory hold  4. Bronchodilators will be administered via Hand Held Nebulizer DMITRI [facility]) to patients when ANY of the following criteria are met  a. Incognizant or uncooperative          b. Patients treated with HHN at Home        c.  Unable to demonstrate proper use of MDI with spacer     d. RR > 30 bpm   5. Bronchodilators will be delivered via Metered Dose Inhaler (MDI), HHN, Aerogen to intubated patients on mechanical ventilation. 6. Inhalation medication orders will be delivered and/or substituted as outlined below. Aerosolized Medications Ordering and Administration Guidelines:    1. All Medications will be ordered by a physician, and their frequency and/or modality will be adjusted as defined by the patients Respiratory Severity Index (RSI) score. 2. If the patient does not have documented COPD, consider discontinuing anticholinergics when RSI is less than 9.  3. If the bronchospasm worsens (increased RSI), then the bronchodilator frequency can be increased to a maximum of every 4 hours. If greater than every 4 hours is required, the physician will be contacted. 4. If the bronchospasm improves, the frequency of the bronchodilator can be decreased, based on the patient's RSI, but not less than home treatment regimen frequency. 5. Bronchodilator(s) will be discontinued if patient has a RSI less than 9 and has received no scheduled or as needed treatment for 72  Hrs. Patients Ordered on a Mucolytic Agent:    1. Must always be administered with a bronchodilator. 2. Discontinue if patient experiences worsened bronchospasm, or secretions have lessened to the point that the patient is able to clear them with a cough. Anti-inflammatory and Combination Medications:    1. If the patient lacks prior history of lung disease, is not using inhaled anti-inflammatory medication at home, and lacks wheezing by examination or by history for at least 24 hours, contact physician for possible discontinuation.

## 2021-03-09 LAB
ANION GAP SERPL CALCULATED.3IONS-SCNC: 9 MMOL/L (ref 3–16)
BASOPHILS ABSOLUTE: 0 K/UL (ref 0–0.2)
BASOPHILS RELATIVE PERCENT: 0.6 %
BUN BLDV-MCNC: 19 MG/DL (ref 7–20)
CALCIUM SERPL-MCNC: 8.7 MG/DL (ref 8.3–10.6)
CHLORIDE BLD-SCNC: 95 MMOL/L (ref 99–110)
CO2: 31 MMOL/L (ref 21–32)
CREAT SERPL-MCNC: 1.1 MG/DL (ref 0.8–1.3)
EOSINOPHILS ABSOLUTE: 0.5 K/UL (ref 0–0.6)
EOSINOPHILS RELATIVE PERCENT: 7.5 %
GFR AFRICAN AMERICAN: >60
GFR NON-AFRICAN AMERICAN: >60
GLUCOSE BLD-MCNC: 313 MG/DL (ref 70–99)
GLUCOSE BLD-MCNC: 324 MG/DL (ref 70–99)
GLUCOSE BLD-MCNC: 324 MG/DL (ref 70–99)
GLUCOSE BLD-MCNC: 341 MG/DL (ref 70–99)
GLUCOSE BLD-MCNC: 343 MG/DL (ref 70–99)
GLUCOSE BLD-MCNC: 350 MG/DL (ref 70–99)
GLUCOSE BLD-MCNC: 360 MG/DL (ref 70–99)
GLUCOSE BLD-MCNC: 429 MG/DL (ref 70–99)
HCT VFR BLD CALC: 29.4 % (ref 40.5–52.5)
HEMOGLOBIN: 9.8 G/DL (ref 13.5–17.5)
LYMPHOCYTES ABSOLUTE: 1.3 K/UL (ref 1–5.1)
LYMPHOCYTES RELATIVE PERCENT: 19.5 %
MCH RBC QN AUTO: 27.9 PG (ref 26–34)
MCHC RBC AUTO-ENTMCNC: 33.2 G/DL (ref 31–36)
MCV RBC AUTO: 84 FL (ref 80–100)
MONOCYTES ABSOLUTE: 0.5 K/UL (ref 0–1.3)
MONOCYTES RELATIVE PERCENT: 7.9 %
NEUTROPHILS ABSOLUTE: 4.3 K/UL (ref 1.7–7.7)
NEUTROPHILS RELATIVE PERCENT: 64.5 %
PDW BLD-RTO: 14.9 % (ref 12.4–15.4)
PERFORMED ON: ABNORMAL
PLATELET # BLD: 313 K/UL (ref 135–450)
PMV BLD AUTO: 7.3 FL (ref 5–10.5)
POTASSIUM SERPL-SCNC: 3.4 MMOL/L (ref 3.5–5.1)
RBC # BLD: 3.51 M/UL (ref 4.2–5.9)
SODIUM BLD-SCNC: 135 MMOL/L (ref 136–145)
WBC # BLD: 6.7 K/UL (ref 4–11)

## 2021-03-09 PROCEDURE — 2580000003 HC RX 258: Performed by: PODIATRIST

## 2021-03-09 PROCEDURE — 97530 THERAPEUTIC ACTIVITIES: CPT

## 2021-03-09 PROCEDURE — 6370000000 HC RX 637 (ALT 250 FOR IP): Performed by: INTERNAL MEDICINE

## 2021-03-09 PROCEDURE — 6370000000 HC RX 637 (ALT 250 FOR IP): Performed by: PODIATRIST

## 2021-03-09 PROCEDURE — 85025 COMPLETE CBC W/AUTO DIFF WBC: CPT

## 2021-03-09 PROCEDURE — 97110 THERAPEUTIC EXERCISES: CPT

## 2021-03-09 PROCEDURE — 2580000003 HC RX 258: Performed by: INTERNAL MEDICINE

## 2021-03-09 PROCEDURE — 2060000000 HC ICU INTERMEDIATE R&B

## 2021-03-09 PROCEDURE — 99232 SBSQ HOSP IP/OBS MODERATE 35: CPT | Performed by: INTERNAL MEDICINE

## 2021-03-09 PROCEDURE — 80048 BASIC METABOLIC PNL TOTAL CA: CPT

## 2021-03-09 PROCEDURE — 6360000002 HC RX W HCPCS: Performed by: INTERNAL MEDICINE

## 2021-03-09 PROCEDURE — 6360000002 HC RX W HCPCS: Performed by: PODIATRIST

## 2021-03-09 PROCEDURE — 94660 CPAP INITIATION&MGMT: CPT

## 2021-03-09 RX ORDER — POTASSIUM CHLORIDE 20 MEQ/1
40 TABLET, EXTENDED RELEASE ORAL ONCE
Status: COMPLETED | OUTPATIENT
Start: 2021-03-09 | End: 2021-03-09

## 2021-03-09 RX ORDER — INSULIN LISPRO 100 [IU]/ML
10 INJECTION, SOLUTION INTRAVENOUS; SUBCUTANEOUS ONCE
Status: COMPLETED | OUTPATIENT
Start: 2021-03-09 | End: 2021-03-09

## 2021-03-09 RX ORDER — INSULIN LISPRO 100 [IU]/ML
5 INJECTION, SOLUTION INTRAVENOUS; SUBCUTANEOUS ONCE
Status: COMPLETED | OUTPATIENT
Start: 2021-03-09 | End: 2021-03-09

## 2021-03-09 RX ADMIN — ATORVASTATIN CALCIUM 20 MG: 20 TABLET, FILM COATED ORAL at 21:21

## 2021-03-09 RX ADMIN — INSULIN GLARGINE 70 UNITS: 100 INJECTION, SOLUTION SUBCUTANEOUS at 09:58

## 2021-03-09 RX ADMIN — INSULIN LISPRO 4 UNITS: 100 INJECTION, SOLUTION INTRAVENOUS; SUBCUTANEOUS at 21:22

## 2021-03-09 RX ADMIN — METOPROLOL SUCCINATE 12.5 MG: 25 TABLET, EXTENDED RELEASE ORAL at 21:23

## 2021-03-09 RX ADMIN — METOPROLOL SUCCINATE 12.5 MG: 25 TABLET, EXTENDED RELEASE ORAL at 09:18

## 2021-03-09 RX ADMIN — POTASSIUM CHLORIDE 40 MEQ: 1500 TABLET, EXTENDED RELEASE ORAL at 12:01

## 2021-03-09 RX ADMIN — ENOXAPARIN SODIUM 40 MG: 40 INJECTION SUBCUTANEOUS at 09:30

## 2021-03-09 RX ADMIN — PRAMIPEXOLE DIHYDROCHLORIDE 0.5 MG: 0.25 TABLET ORAL at 21:23

## 2021-03-09 RX ADMIN — MEROPENEM 1000 MG: 1 INJECTION, POWDER, FOR SOLUTION INTRAVENOUS at 03:27

## 2021-03-09 RX ADMIN — INSULIN LISPRO 10 UNITS: 100 INJECTION, SOLUTION INTRAVENOUS; SUBCUTANEOUS at 11:54

## 2021-03-09 RX ADMIN — TAMSULOSIN HYDROCHLORIDE 0.8 MG: 0.4 CAPSULE ORAL at 09:17

## 2021-03-09 RX ADMIN — CHOLESTYRAMINE 4 G: 4 POWDER, FOR SUSPENSION ORAL at 09:31

## 2021-03-09 RX ADMIN — PANTOPRAZOLE SODIUM 40 MG: 40 TABLET, DELAYED RELEASE ORAL at 21:23

## 2021-03-09 RX ADMIN — PANTOPRAZOLE SODIUM 40 MG: 40 TABLET, DELAYED RELEASE ORAL at 09:18

## 2021-03-09 RX ADMIN — Medication 5 MG: at 21:23

## 2021-03-09 RX ADMIN — LOSARTAN POTASSIUM 25 MG: 25 TABLET, FILM COATED ORAL at 09:18

## 2021-03-09 RX ADMIN — FUROSEMIDE 40 MG: 40 TABLET ORAL at 17:29

## 2021-03-09 RX ADMIN — MEROPENEM 1000 MG: 1 INJECTION, POWDER, FOR SOLUTION INTRAVENOUS at 20:18

## 2021-03-09 RX ADMIN — GABAPENTIN 300 MG: 300 CAPSULE ORAL at 09:17

## 2021-03-09 RX ADMIN — Medication 10 ML: at 09:22

## 2021-03-09 RX ADMIN — INSULIN LISPRO 8 UNITS: 100 INJECTION, SOLUTION INTRAVENOUS; SUBCUTANEOUS at 17:30

## 2021-03-09 RX ADMIN — DULOXETINE HYDROCHLORIDE 90 MG: 60 CAPSULE, DELAYED RELEASE ORAL at 09:18

## 2021-03-09 RX ADMIN — GUAIFENESIN 600 MG: 600 TABLET, EXTENDED RELEASE ORAL at 21:21

## 2021-03-09 RX ADMIN — INSULIN LISPRO 10 UNITS: 100 INJECTION, SOLUTION INTRAVENOUS; SUBCUTANEOUS at 00:10

## 2021-03-09 RX ADMIN — INSULIN LISPRO 5 UNITS: 100 INJECTION, SOLUTION INTRAVENOUS; SUBCUTANEOUS at 21:23

## 2021-03-09 RX ADMIN — INSULIN LISPRO 8 UNITS: 100 INJECTION, SOLUTION INTRAVENOUS; SUBCUTANEOUS at 09:07

## 2021-03-09 RX ADMIN — ASPIRIN 81 MG: 81 TABLET, FILM COATED ORAL at 09:18

## 2021-03-09 RX ADMIN — CLOPIDOGREL BISULFATE 75 MG: 75 TABLET, FILM COATED ORAL at 09:18

## 2021-03-09 RX ADMIN — GABAPENTIN 300 MG: 300 CAPSULE ORAL at 21:21

## 2021-03-09 RX ADMIN — INSULIN GLARGINE 70 UNITS: 100 INJECTION, SOLUTION SUBCUTANEOUS at 21:22

## 2021-03-09 RX ADMIN — MEROPENEM 1000 MG: 1 INJECTION, POWDER, FOR SOLUTION INTRAVENOUS at 12:02

## 2021-03-09 RX ADMIN — GABAPENTIN 300 MG: 300 CAPSULE ORAL at 16:26

## 2021-03-09 ASSESSMENT — PAIN SCALES - GENERAL
PAINLEVEL_OUTOF10: 0

## 2021-03-09 ASSESSMENT — PAIN DESCRIPTION - PROGRESSION
CLINICAL_PROGRESSION: GRADUALLY IMPROVING

## 2021-03-09 NOTE — FLOWSHEET NOTE
03/09/21 1128   Encounter Summary   Services provided to: Patient   Referral/Consult From: Alexei   Continue Visiting   (3/9/2021, SHILA. )   Complexity of Encounter Moderate   Length of Encounter 15 minutes   Routine   Type Follow up   Assessment Approachable   Intervention Nurtured hope   Outcome Expressed gratitude

## 2021-03-09 NOTE — CARE COORDINATION
Case Management Daily Note                    Date: 3/9/2021     Patient Name: Benson Ann    Date of Admission: 3/1/2021  3:05 PM  YOB: 1960    Length of Stay: 8         Patient Admission Status: Inpatient  Diagnosis:Wound infection [T14. 8XXA, L08.9]     ________________________________________________________________________________________  Discharge Plan: SNF:  [facility]. [address] [address]       Phone: [phone]      Fax: [phone]        Insurance: Payor: Fernandez Mcgarry / Plan: Sean Diallo COMPLETE / Product Type: *No Product type* /   Is pre-cert/notification needed: Yes, Pre-cert needed     Tentative discharge date: 3/10     Current barriers: Therapy pending. Pre-cert needed     Referrals completed: SNF: Multiple Referrals     Resources/ information provided: SNF List   ________________________________________________________________________________________  PT AM-PAC: 6 / 24 per last evaluation on: 3/5    OT AM-PAC: 13 / 24 per last evaluation on: 3/5    DME Needs for discharge: defer  ________________________________________________________________________________________  Notes/Plan of Care:   SW rounded on this date. Patient in need of updated therapy evaluations to start pre-cert. SW messaged Therapy this AM. They have assessed patient. Notes are pending. Patient to get first dose of Invanze today. Rapid COVID was negative on 3/8/21. SW can fax updated clinicals to [phone]. Once notes entered. Daughter, Eduardo Levy is aware of plan for discharge to [facility]. Patient's prosthesis is at home, could increase rehab potential if brought to SNF. UPDATE: 10:40 am SW faxed Therapy notes for pre-cert. SW met with patient at bedside at 3:55 pm. SW explained pre-cert process and delay and process. Patient thanked social for explanation. SW to follow patient tomorrow on pre-cert process.      Stephen and/or his family were provided with choice of provider; he and/or his family are in agreement with the discharge plan at this time.     Care Transition Patient: RENETTA Cooney  [facility]   Case Management Department  Ph: [phone]

## 2021-03-09 NOTE — PROGRESS NOTES
Podiatric Surgery Daily Progress Note  Stephen Fish      Subjective :   Patient seen and examined at his reclining chair this a.m. Patient denies nausea, vomiting, fever, chills, shortness of breath. Review of Systems: A 10 point review of systems was conducted, significant findings as noted in HPI. All other systems negative. Objective     /75   Pulse 75   Temp 97.2 °F (36.2 °C) (Oral)   Resp 18   Ht 5' 6\" (1.676 m)   Wt 274 lb 4 oz (124.4 kg)   SpO2 94%   BMI 44.27 kg/m²     I/O:    Intake/Output Summary (Last 24 hours) at 3/9/2021 0545  Last data filed at 3/9/2021 0034  Gross per 24 hour   Intake 450 ml   Output 3550 ml   Net -3100 ml              Wt Readings from Last 3 Encounters:   No data found for Wt       LABS:    Recent Labs     03/07/21  0607 03/08/21  0540   WBC 7.5 7.4   HGB 9.1* 9.4*   HCT 27.3* 28.4*    259        Recent Labs     03/08/21  0540   *   K 4.1   CL 94*   CO2 28   BUN 20   CREATININE 1.2        No results for input(s): PROT, INR, APTT in the last 72 hours. FOCUSED LEFT LOWER EXTREMITY EXAMINATION:    Dressing to the left lower extremity remains clean, dry, and intact. Patient able to move ankle without any pain or crepitus. IMAGING:  Narrative   XR FOOT LEFT (2 VIEWS)       Indication: assess for underlying gas formation at recent amputation site        COMPARISON: None       Findings: AP and lateral views of the left foot were obtained. Transmetatarsal amputation of all digits is noted. There is overlying packing material at the surgical site.       Vascular calcification is noted. There is no comparison study to immediate postoperative radiographs or presurgical appearance of the foot. No discrete soft tissue gas.       If concerned for ongoing infection, MRI without and with contrast is recommend along with clinical correlation with lab parameters.            Impression   Impression: Limited study, as above.  No comparisons are available. ASSESSMENT/PLAN  S/P Revision of Transmetatarsal amputation due to osteomyelitis, tendo-achilles lengthening, delayed primary closure; left LE (DOS: 3/4/21)  Peripheral arterial disease  History of BKA; RLE  History of TMA; LLE  Type 1 DM with peripheral neuropathy      -VSS, afebrile, no leukocytosis noted.  -All imaging reviewed, see impression above.  -Wound culture- enterobacter cloacae, proteus mirabilis. -Surgical path, Each metatarsal sample with acute osteomyelitis, the largest and smallest florid.  -Laser perfusion study to be done as outpatient to determine need for further vascular intervention.  -Dressing change to left lower extremity deferred and will follow up with Dr. Trace Kruger for  postop visit. -ID following, PICC line placed with infusion orders Invanz 1 g daily through 3/31  -Nonweightbearing to left lower extremity.  -Patient is fully weightbearing to the right lower extremity with prosthesis and as tolerated with assistance.  -Patient instructed strict follow-up with Dr. Trace Kruger 1 week after being discharged from the hospital.        DISPO: S/P TMA, JONATHAN, with delayed primary closure, Left foot. Procedure felt to be definitive. No further surgical intervention and patient is okay for discharge from podiatry standpoint. Patient will follow up with Dr. Trace Kruger 1 week after being discharged from the hospital.    Discussed assessment and plan with Dr. Kateryna Thomas resident, PGY 2  [phone] or perfect serve    Patient was seen and evaluated at bedside. Agree with residents assessment and treatment plan.   Maxime Duque DPM

## 2021-03-09 NOTE — PROGRESS NOTES
ID Follow-up NOTE    CC:   Dehiscence / infection of L TMA amputation site  Antibiotics: Meropenem    Admit Date: 3/1/2021  Hospital Day: 9    Subjective:     Pt has no complaint today  Denies L foot / surg site pain    Objective:     Patient Vitals for the past 8 hrs:   BP Temp Temp src Pulse Resp SpO2   03/09/21 0812 (!) 159/72 97.7 °F (36.5 °C) Oral 84 18 94 %   03/09/21 0338 116/75 97.2 °F (36.2 °C) Oral 75 18 94 %     I/O last 3 completed shifts: In: 850 [P.O.:640; I.V.:10; IV Piggyback:200]  Out: 3750 [DMKHV:5221]  I/O this shift:  In: 360 [P.O.:360]  Out: 650 [Urine:650]    EXAM:  GENERAL: No apparent distress.   RA  HEENT: Membranes moist, no oral lesion  NECK:  Supple, no lymphadenopathy  LUNGS: Clear b/l, no rales, no dullness  CARDIAC: RRR, no murmur appreciated  ABD:  + BS, soft / NT  EXT:  R BKA healed; L foot stump with dressing /ACE  NEURO: No focal neurologic findings  PSYCH: Orientation, sensorium, mood normal  LINES:  L PICC in place       Data Review:  Lab Results   Component Value Date    WBC 6.7 03/09/2021    HGB 9.8 (L) 03/09/2021    HCT 29.4 (L) 03/09/2021    MCV 84.0 03/09/2021     03/09/2021     Lab Results   Component Value Date    CREATININE 1.1 03/09/2021    BUN 19 03/09/2021     (L) 03/09/2021    K 3.4 (L) 03/09/2021    CL 95 (L) 03/09/2021    CO2 31 03/09/2021       Hepatic Function Panel: No results found for: ALKPHOS, ALT, AST, PROT, BILITOT, BILIDIR, IBILI, LABALBU    MICRO:  3/3 COVID-19, rapid - not detected  3/3 Cx, Wound - Enterobacter cloacae; Proteus mirabilis (heavy growth)  Proteus mirabilis   Antibiotic Interpretation HAYLIE   ampicillin Sensitive <=8 mcg/mL   ceFAZolin Intermediate 4 mcg/mL   cefepime Sensitive <=2 mcg/mL   cefTRIAXone Sensitive <=1 mcg/mL   cefuroxime Sensitive <=4 mcg/mL   ciprofloxacin Resistant >2 mcg/mL   ertapenem Sensitive <=0.5 mcg/mL   gentamicin Intermediate 8 mcg/mL   meropenem Sensitive <=1 mcg/mL   piperacillin-tazobactam Sensitive <=16 mcg/mL   tobramycin Sensitive <=4 mcg/mL   trimethoprim-sulfamethoxazole Resistant >2/38 mcg/mL     Enterobacter cloacae complex   Antibiotic Interpretation HAYLIE   amoxicillin-clavulanate Resistant >16/8 mcg/mL   ampicillin Resistant >16 mcg/mL   ceFAZolin Resistant >16 mcg/mL   cefepime Sensitive <=2 mcg/mL   cefTRIAXone Intermediate 2 mcg/mL   cefuroxime Sensitive 8 mcg/mL   ciprofloxacin Sensitive <=1 mcg/mL   ertapenem Sensitive <=0.5 mcg/mL   gentamicin Sensitive <=4 mcg/mL   meropenem Sensitive <=1 mcg/mL   piperacillin-tazobactam Sensitive <=16 mcg/mL   trimethoprim-sulfamethoxazole Sensitive <=2/38 mcg/mL     3/4 Surgical Pathology -  Left foot, transmetatarsal amputation revision:      - Acute and gangrenous necrosis, skin and soft tissues, with calcific        arteriolar atherosclerosis, favor focally thrombosed.      - Each metatarsal sample with acute osteomyelitis, the largest and        smallest florid.      - Separate areas of bony repair/regeneration. IMAGING:  3/1 XR L Foot -   Vascular calcification is noted. There is no comparison study to immediate postoperative radiographs or presurgical appearance of the foot. No discrete soft tissue gas.       If concerned for ongoing infection, MRI without and with contrast is recommend.      3/2 IR Angiogram LEFT lower extremity -   1. abdominal aorta is patent with patent mesenteric's and renal vessels. 2. left common femoral artery profunda femoris is patent. 3.  Left superficial femoral arteries patent with patent left popliteal artery. 4.  Left anterior tibial artery distal 99% stenosis with subtotal occlusion of the distal left dorsalis pedis artery. 5.  Left peroneal artery occludes distally. 6.  Left posterior tibial artery is occluded at the ostium. 7.  Plantar artery is occluded with incomplete plantar loop.     3/3 CXR - Cardiomegaly; Sternotomy wires noted; Clear lungs    3/3 IR Angio LEFT lower extremity    3/4 XR Left Foot - Further amputation of the metatarsals without complicating features    3/6 CXR - Mild diffuse interstitial edema or pneumonia    3/6 XR KUB - Nonspecific bowel gas pattern    Scheduled Meds:   potassium chloride  40 mEq Oral Once    ertapenem (INVanz) IVPB  1,000 mg Intravenous Once    insulin glargine  70 Units Subcutaneous BID    insulin lispro  0-12 Units Subcutaneous TID WC    insulin lispro  0-6 Units Subcutaneous Nightly    clopidogrel  75 mg Oral Daily    guaiFENesin  600 mg Oral BID    meropenem  1,000 mg Intravenous Q8H    sodium chloride flush  10 mL Intravenous 2 times per day    furosemide  40 mg Oral BID    enoxaparin  40 mg Subcutaneous Daily    sodium chloride flush  10 mL Intravenous 2 times per day    melatonin  5 mg Oral Nightly    aspirin  81 mg Oral Daily    cholestyramine light  4 g Oral Daily    DULoxetine  90 mg Oral Daily    gabapentin  300 mg Oral TID    metoprolol succinate  12.5 mg Oral BID    losartan  25 mg Oral Daily    pantoprazole  40 mg Oral BID    pramipexole  0.5 mg Oral Nightly    atorvastatin  20 mg Oral Nightly    tamsulosin  0.8 mg Oral Daily    sodium chloride flush  10 mL Intravenous 2 times per day       Continuous Infusions:   dextrose         PRN Meds:  glucose, dextrose, glucagon (rDNA), dextrose, oxyCODONE-acetaminophen **OR** oxyCODONE-acetaminophen, sodium chloride flush, benzocaine-menthol, acetaminophen, ipratropium-albuterol, sodium chloride flush, promethazine **OR** ondansetron, polyethylene glycol, acetaminophen **OR** acetaminophen      Assessment:     64 yo DM, neuropathy, PVD, CAD / CABG, R BKA  Hx L LE PVD  Recent L TMA - 1/7/21      Pt had dehiscence of L TMA wound weeks / months ago (pt is vague historian)  No drainage, no pain. High BS     Admit 3/1 -   Seen by Podiatry, Cardiology  Reviewed images of stump wound.   Exposed MT 1/2/3 per Podiatry note  Started on vancomycin + cefepime  L LE cath 3/2 with L ant tib a stenosis, L peroneal / post tib a occlusion    3/3 - L LE angio with L ant tib and plantar a balloon angioplasty    3/4 - Revision L TMA with secondary closure     IMP/  Mult co-morbidities including DM, PVD, R BKA  L TMA dehiscence with exposed MT, cult + P mirabilis, E cloacae  PVD    S/p revascularization, TMA revision, closure   Due to limb threatening nature of pt's situation, will recommend iv antibiotic to lower risk dehiscence, need for further revision or limb loss. Plan:     Cont meropenem  Would care per per Podiatry      See JONAH    Discussed with pt, RN  Kiran Diaz MD    INFUSION ORDERS:  Invanz 1 gm iv daily through 3/31  - First dose given in hospital  - PICC   - Disposition / date discharge  - Check CBC w diff, CMP, ESR, CRP every Mon or Tue - FAX result to [phone]  - Call with antibiotic / infusion issues, [phone]  - If facility - call with any change in status, transfer out of facility or to hospital - [phone]  - No f/u in outpatient ID office necessary.   F/u Podiatry, interventional Card after discharge

## 2021-03-09 NOTE — PROGRESS NOTES
Hospitalist Progress Note      PCP: Daniel Gonzales DO    Date of Admission: 3/1/2021    Chief Complaint: left foot wound    Subjective: doing well, concerned about his high blood glucose level      Medications:  Reviewed    Infusion Medications    dextrose       Scheduled Medications    ertapenem (INVanz) IVPB  1,000 mg Intravenous Once    insulin glargine  70 Units Subcutaneous BID    insulin lispro  0-12 Units Subcutaneous TID WC    insulin lispro  0-6 Units Subcutaneous Nightly    clopidogrel  75 mg Oral Daily    guaiFENesin  600 mg Oral BID    meropenem  1,000 mg Intravenous Q8H    sodium chloride flush  10 mL Intravenous 2 times per day    furosemide  40 mg Oral BID    enoxaparin  40 mg Subcutaneous Daily    sodium chloride flush  10 mL Intravenous 2 times per day    melatonin  5 mg Oral Nightly    aspirin  81 mg Oral Daily    cholestyramine light  4 g Oral Daily    DULoxetine  90 mg Oral Daily    gabapentin  300 mg Oral TID    metoprolol succinate  12.5 mg Oral BID    losartan  25 mg Oral Daily    pantoprazole  40 mg Oral BID    pramipexole  0.5 mg Oral Nightly    atorvastatin  20 mg Oral Nightly    tamsulosin  0.8 mg Oral Daily    sodium chloride flush  10 mL Intravenous 2 times per day     PRN Meds: glucose, dextrose, glucagon (rDNA), dextrose, oxyCODONE-acetaminophen **OR** oxyCODONE-acetaminophen, sodium chloride flush, benzocaine-menthol, acetaminophen, ipratropium-albuterol, sodium chloride flush, promethazine **OR** ondansetron, polyethylene glycol, acetaminophen **OR** acetaminophen      Intake/Output Summary (Last 24 hours) at 3/9/2021 1553  Last data filed at 3/9/2021 1308  Gross per 24 hour   Intake 1320 ml   Output 2350 ml   Net -1030 ml       Physical Exam Performed:    BP (!) 142/69   Pulse 90   Temp 97.6 °F (36.4 °C) (Oral)   Resp 18   Ht 5' 6\" (1.676 m)   Wt 274 lb 4 oz (124.4 kg)   SpO2 95%   BMI 44.27 kg/m²     General appearance: NAD, morbidly obese Cardiomegaly. Sternotomy wires are noted. Clear lungs. XR FOOT LEFT (2 VIEWS)   Final Result   Impression: Limited study, as above. No comparisons are available. IR ANGIOGRAM EXTREMITY LEFT    (Results Pending)   IR ANGIOGRAM EXTREMITY LEFT    (Results Pending)   LASER SKIN PERFUSION PRESSURE STUDY    (Results Pending)           Assessment/Plan:    Active Hospital Problems    Diagnosis Date Noted    Open wound of left foot [S91.302A] 03/02/2021    Wound dehiscence, surgical Cosimo Chato 03/02/2021    Rupture of operation wound [T81.31XA]     Wound infection [T14. 8XXA, L08.9] 03/01/2021     57-year-old male history of diabetes type 1, peripheral arterial disease, CAD, right BKA, left foot transmetatarsal amputation 5 weeks ago at [facility], sutures removed 3 weeks ago, came to the ED with complaint of wound dehiscence  He underwent peripheral diagnostic angiogram on 3/2/2021 which showed 99% stenosis of the left anterior tibial artery with subtotal occlusion of the left distal dorsalis pedis artery, successful AT/anterior plantar artery angioplasty on 3/3. SP TRANSMETATARSAL AMPUTATION WITH DELAYED PRIMARY CLOSURE, TENDOACHILLES LENGTHENING . LEFT LOWER EXTREMITY 3/4     He had postoperative hypoxemia, likely volume overloaded/acute on chronic diastolic congestive heart failure patient was diuresed with IV Lasix.   In discharge on home dose of Lasix.     Hypokalemia - replacing as needed    Left lower extremity wound dehiscence/critical limb ischemia; patient will follow up with podiatry as outpatient, due to limb threatening nature of patient's situation, recommended IV antibiotics to lower the risk of dehiscence, per ID recommendation Invanz 1 g IV daily through 3/21.     For type 1 diabetes, continue Lantus 70 units bid, sliding scale insulin, Hypoglycemia protocol     CAD s/p CABG  Continue losartan metoprolol Lasix statin     Morbid obesity excess calories  Counseled on weight loss    DVT Prophylaxis: Lovenox  Diet: DIET CARB CONTROL;  Code Status: Full Code    PT/OT Eval Status: on going    Dispo - pending precert, can be Fanta Parks MD  Hospitalist

## 2021-03-09 NOTE — PLAN OF CARE
Problem: Falls - Risk of:  Goal: Will remain free from falls  Description: Will remain free from falls  Outcome: Ongoing  Note: Patient at risk for falls. Patient resting quietly in chair Side rails up x 2. chair locked in lowest position. Chair alarm on. Bedside table and call light within reach. Patient instructed to call for assistance. Patient verbalized understanding. Will continue to monitor. Problem: Skin Integrity:  Goal: Absence of new skin breakdown  Description: Absence of new skin breakdown  Outcome: Ongoing     Problem: Pain:  Goal: Pain level will decrease  Outcome: Ongoing  Note: Pt resting at this time. No complaints of pain. Encouraged patient to call out with any needs. Will continue to monitor.

## 2021-03-09 NOTE — PROGRESS NOTES
Physical Therapy  Facility/Department: [address]  Daily Treatment Note  NAME: Andrew Rivers  : 1960  MRN: [id number]    Date of Service: 3/9/2021    Discharge Recommendations: Andrew Rivers scored a 6/24 on the AM-PAC short mobility form. Current research shows that an AM-PAC score of 17 or less is typically not associated with a discharge to the patient's home setting. Based on the patient's AM-PAC score and their current functional mobility deficits, it is recommended that the patient have 3-5 sessions per week of Physical Therapy at d/c to increase the patient's independence. Please see assessment section for further patient specific details. If patient discharges prior to next session this note will serve as a discharge summary. Please see below for the latest assessment towards goals. PT Equipment Recommendations  Equipment Needed: No  Other: defer to next level of care    Assessment   Body structures, Functions, Activity limitations: Decreased functional mobility ; Decreased endurance;Decreased strength;Decreased balance  Assessment: Mobility significantly limited at this time due to L LE NWB and R LE prosthesis not here today. Encouraged pt to have R LE prosthesis brought to SNF to allow for increased mobility - pt states he does not have anyone to bring it. Discussed with . Pt tolerated session well and able to scoot in chair with CGA with cues for technique. Will plan to try lateral transfers at future session. Extra time spent educating pt on safe transfers. Pt demos decreased insight. Rec continued IP PT. Treatment Diagnosis: Decreased functional therapy  Prognosis: Fair  Patient Education: role of PT, use of call light and d/c planning - pt verb understanding  REQUIRES PT FOLLOW UP: Yes     Patient Diagnosis(es): The primary encounter diagnosis was Dehiscence of operative wound, initial encounter.  Diagnoses of Hx of diabetic neuropathy, Critical lower limb ischemia, and Status post transmetatarsal amputation of foot, left ([address] were also pertinent to this visit. has a past medical history of CAD (coronary artery disease), Cerebral artery occlusion with cerebral infarction ([address] and Diabetes mellitus ([address] has a past surgical history that includes Coronary artery bypass graft; Leg amputation below knee (Right); Foot Amputation (Left, 3/4/2021); and Achilles tendon surgery (Left, 3/4/2021). Restrictions  Position Activity Restriction  Other position/activity restrictions: up as tolerated, NWB LLE  Subjective   General  Chart Reviewed: Yes  Additional Pertinent Hx: 57-year-old gentleman with a significant history of diabetes mellitus type 1, PAD, CAD, right BKA, left foot transmetatarsal amputation 5 weeks ago at  sutures were removed 3 weeks ago came into ER with a concern for wound dehiscence. Subjective  Subjective: Pt found sitting in recliner. No c/o pain. Agrees to PT. Pain Screening  Patient Currently in Pain: Denies       Orientation  Orientation  Overall Orientation Status: Within Normal Limits  Cognition      Objective   Bed mobility  Scooting: Contact guard assistance(scooting forward and back in chair, cues given for weight shifting and overall technique)           Balance  Sitting - Static: Good  Sitting - Dynamic: Good  Exercises  Comments: x 10 B seated CHERYL peralta, GS; 2 x 5 chair push ups         Comment: Educated pt extensively on safe transfer techniques including need for lateral scooting at this time 2* L LE is NWB and R LE prosthesis not here.   Pt states R LE prosthesis is at home and no one is available to bring it in.              G-Code     OutComes Score                                                     AM-PAC Score  AM-PAC Inpatient Mobility Raw Score : 6 (03/09/21 0907)  AM-PAC Inpatient T-Scale Score : 23.55 (03/09/21 0907)  Mobility Inpatient CMS 0-100% Score: 100 (03/09/21 9899)  Mobility Inpatient CMS G-Code Modifier : NAYA (03/09/21 0886)          Goals  Short term goals  Time Frame for Short term goals: d/c  Short term goal 1: scooting mod ax1  MET 3/9  Short term goal 2: pt will tolerate bed<>chair transfer  ongoing  Short term goal 3: Pt will participate in lateral scooting assessment  Patient Goals   Patient goals : none stated    Plan    Plan  Times per week: 2-5  Times per day: Daily  Current Treatment Recommendations: Strengthening, Balance Training, Functional Mobility Training, Transfer Training, Endurance Training, Safety Education & Training  Safety Devices  Type of devices: Gait belt, Nurse notified, Call light within reach, Chair alarm in place, Left in chair     Therapy Time   Individual Concurrent Group Co-treatment   Time In 0822         Time Out 0900         Minutes 38                 Timed Code Treatment Minutes:  38    Total Treatment Minutes:  38    If patient is discharged prior to next treatment, this note will serve as the discharge summary.   Alvina Johnson, PT, DPT  [phone]

## 2021-03-09 NOTE — PROGRESS NOTES
Sent message to hospitalist on shift. Patient blood sugar 464. Gave insulin as ordered. Will continue to monitor patient. Awaiting response from doctor.

## 2021-03-09 NOTE — PROGRESS NOTES
Patient alert and oriented times four. Patient vss this shift. Patient on Telemetry nsr. Patient has picc line in left upper arm. Patient is chair fast. Patient using the urinal and bedpan if necessary. patient complains of his blood glucose being high and was upset nothing had been done. Messaged doctor jonah. Orders received for 10 units of insulin. Patient adbominal sounds active. Patient has multiple surgical sites. bka to right side, lefts transmetatarsal, and angio site in right leg. Patient breath sounds diminished. Patient bed locked in lowest position with bed alarm on. Call light bedside table and belongings in reach. Patient encouraged to call with any and all needs. Patient verbalizes understanding and call appropriately. Will continue to monitor.

## 2021-03-10 LAB
ANION GAP SERPL CALCULATED.3IONS-SCNC: 9 MMOL/L (ref 3–16)
BASOPHILS ABSOLUTE: 0 K/UL (ref 0–0.2)
BASOPHILS RELATIVE PERCENT: 0.6 %
BUN BLDV-MCNC: 15 MG/DL (ref 7–20)
CALCIUM SERPL-MCNC: 8.8 MG/DL (ref 8.3–10.6)
CHLORIDE BLD-SCNC: 99 MMOL/L (ref 99–110)
CO2: 29 MMOL/L (ref 21–32)
CREAT SERPL-MCNC: 1.1 MG/DL (ref 0.8–1.3)
EOSINOPHILS ABSOLUTE: 0.6 K/UL (ref 0–0.6)
EOSINOPHILS RELATIVE PERCENT: 7.9 %
GFR AFRICAN AMERICAN: >60
GFR NON-AFRICAN AMERICAN: >60
GLUCOSE BLD-MCNC: 128 MG/DL (ref 70–99)
GLUCOSE BLD-MCNC: 135 MG/DL (ref 70–99)
GLUCOSE BLD-MCNC: 153 MG/DL (ref 70–99)
GLUCOSE BLD-MCNC: 155 MG/DL (ref 70–99)
GLUCOSE BLD-MCNC: 189 MG/DL (ref 70–99)
GLUCOSE BLD-MCNC: 210 MG/DL (ref 70–99)
HCT VFR BLD CALC: 31.1 % (ref 40.5–52.5)
HEMOGLOBIN: 10.2 G/DL (ref 13.5–17.5)
LYMPHOCYTES ABSOLUTE: 1.9 K/UL (ref 1–5.1)
LYMPHOCYTES RELATIVE PERCENT: 24 %
MCH RBC QN AUTO: 27.9 PG (ref 26–34)
MCHC RBC AUTO-ENTMCNC: 32.9 G/DL (ref 31–36)
MCV RBC AUTO: 84.7 FL (ref 80–100)
MONOCYTES ABSOLUTE: 0.5 K/UL (ref 0–1.3)
MONOCYTES RELATIVE PERCENT: 6.9 %
NEUTROPHILS ABSOLUTE: 4.8 K/UL (ref 1.7–7.7)
NEUTROPHILS RELATIVE PERCENT: 60.6 %
PDW BLD-RTO: 14.8 % (ref 12.4–15.4)
PERFORMED ON: ABNORMAL
PLATELET # BLD: 341 K/UL (ref 135–450)
PMV BLD AUTO: 7 FL (ref 5–10.5)
POTASSIUM SERPL-SCNC: 3.9 MMOL/L (ref 3.5–5.1)
RBC # BLD: 3.67 M/UL (ref 4.2–5.9)
SODIUM BLD-SCNC: 137 MMOL/L (ref 136–145)
WBC # BLD: 8 K/UL (ref 4–11)

## 2021-03-10 PROCEDURE — 80048 BASIC METABOLIC PNL TOTAL CA: CPT

## 2021-03-10 PROCEDURE — 6370000000 HC RX 637 (ALT 250 FOR IP): Performed by: PODIATRIST

## 2021-03-10 PROCEDURE — 6360000002 HC RX W HCPCS: Performed by: INTERNAL MEDICINE

## 2021-03-10 PROCEDURE — 2580000003 HC RX 258: Performed by: INTERNAL MEDICINE

## 2021-03-10 PROCEDURE — 6360000002 HC RX W HCPCS: Performed by: PODIATRIST

## 2021-03-10 PROCEDURE — 85025 COMPLETE CBC W/AUTO DIFF WBC: CPT

## 2021-03-10 PROCEDURE — 2060000000 HC ICU INTERMEDIATE R&B

## 2021-03-10 PROCEDURE — 6370000000 HC RX 637 (ALT 250 FOR IP): Performed by: INTERNAL MEDICINE

## 2021-03-10 RX ADMIN — FUROSEMIDE 40 MG: 40 TABLET ORAL at 17:46

## 2021-03-10 RX ADMIN — MEROPENEM 1000 MG: 1 INJECTION, POWDER, FOR SOLUTION INTRAVENOUS at 03:47

## 2021-03-10 RX ADMIN — FUROSEMIDE 40 MG: 40 TABLET ORAL at 08:31

## 2021-03-10 RX ADMIN — INSULIN LISPRO 2 UNITS: 100 INJECTION, SOLUTION INTRAVENOUS; SUBCUTANEOUS at 20:34

## 2021-03-10 RX ADMIN — INSULIN GLARGINE 70 UNITS: 100 INJECTION, SOLUTION SUBCUTANEOUS at 20:35

## 2021-03-10 RX ADMIN — GUAIFENESIN 600 MG: 600 TABLET, EXTENDED RELEASE ORAL at 20:34

## 2021-03-10 RX ADMIN — METOPROLOL SUCCINATE 12.5 MG: 25 TABLET, EXTENDED RELEASE ORAL at 20:34

## 2021-03-10 RX ADMIN — GUAIFENESIN 600 MG: 600 TABLET, EXTENDED RELEASE ORAL at 08:32

## 2021-03-10 RX ADMIN — ERTAPENEM SODIUM 1000 MG: 1 INJECTION, POWDER, LYOPHILIZED, FOR SOLUTION INTRAMUSCULAR; INTRAVENOUS at 14:37

## 2021-03-10 RX ADMIN — INSULIN LISPRO 2 UNITS: 100 INJECTION, SOLUTION INTRAVENOUS; SUBCUTANEOUS at 17:46

## 2021-03-10 RX ADMIN — Medication 5 MG: at 21:52

## 2021-03-10 RX ADMIN — PANTOPRAZOLE SODIUM 40 MG: 40 TABLET, DELAYED RELEASE ORAL at 20:34

## 2021-03-10 RX ADMIN — PRAMIPEXOLE DIHYDROCHLORIDE 0.5 MG: 0.25 TABLET ORAL at 20:34

## 2021-03-10 RX ADMIN — ATORVASTATIN CALCIUM 20 MG: 20 TABLET, FILM COATED ORAL at 20:34

## 2021-03-10 RX ADMIN — ASPIRIN 81 MG: 81 TABLET, FILM COATED ORAL at 08:32

## 2021-03-10 RX ADMIN — LOSARTAN POTASSIUM 25 MG: 25 TABLET, FILM COATED ORAL at 08:32

## 2021-03-10 RX ADMIN — Medication 10 ML: at 20:40

## 2021-03-10 RX ADMIN — INSULIN GLARGINE 70 UNITS: 100 INJECTION, SOLUTION SUBCUTANEOUS at 08:43

## 2021-03-10 RX ADMIN — GABAPENTIN 300 MG: 300 CAPSULE ORAL at 20:34

## 2021-03-10 RX ADMIN — Medication 10 ML: at 08:48

## 2021-03-10 RX ADMIN — TAMSULOSIN HYDROCHLORIDE 0.8 MG: 0.4 CAPSULE ORAL at 08:31

## 2021-03-10 RX ADMIN — CLOPIDOGREL BISULFATE 75 MG: 75 TABLET, FILM COATED ORAL at 08:32

## 2021-03-10 RX ADMIN — POLYETHYLENE GLYCOL 3350 17 G: 17 POWDER, FOR SOLUTION ORAL at 10:14

## 2021-03-10 RX ADMIN — PANTOPRAZOLE SODIUM 40 MG: 40 TABLET, DELAYED RELEASE ORAL at 08:32

## 2021-03-10 RX ADMIN — ENOXAPARIN SODIUM 40 MG: 40 INJECTION SUBCUTANEOUS at 08:33

## 2021-03-10 RX ADMIN — DULOXETINE HYDROCHLORIDE 90 MG: 60 CAPSULE, DELAYED RELEASE ORAL at 08:31

## 2021-03-10 RX ADMIN — GABAPENTIN 300 MG: 300 CAPSULE ORAL at 13:47

## 2021-03-10 RX ADMIN — INSULIN LISPRO 2 UNITS: 100 INJECTION, SOLUTION INTRAVENOUS; SUBCUTANEOUS at 12:18

## 2021-03-10 RX ADMIN — METOPROLOL SUCCINATE 12.5 MG: 25 TABLET, EXTENDED RELEASE ORAL at 08:32

## 2021-03-10 RX ADMIN — GABAPENTIN 300 MG: 300 CAPSULE ORAL at 08:32

## 2021-03-10 RX ADMIN — CHOLESTYRAMINE 4 G: 4 POWDER, FOR SUSPENSION ORAL at 08:32

## 2021-03-10 ASSESSMENT — PAIN SCALES - GENERAL
PAINLEVEL_OUTOF10: 0

## 2021-03-10 NOTE — PROGRESS NOTES
Podiatric Surgery Daily Progress Note  Stephen Fish      Subjective :   Patient seen and examined at his reclining chair this a.m. Patient denies nausea, vomiting, fever, chills, shortness of breath. Review of Systems: A 10 point review of systems was conducted, significant findings as noted in HPI. All other systems negative. Objective     BP (!) 156/124   Pulse 75   Temp 97.8 °F (36.6 °C) (Axillary)   Resp 18   Ht 5' 6\" (1.676 m)   Wt 274 lb 4 oz (124.4 kg)   SpO2 97%   BMI 44.27 kg/m²     I/O:    Intake/Output Summary (Last 24 hours) at 3/10/2021 0502  Last data filed at 3/9/2021 1936  Gross per 24 hour   Intake 1480 ml   Output 1850 ml   Net -370 ml              Wt Readings from Last 3 Encounters:   No data found for Wt       LABS:    Recent Labs     03/08/21  0540 03/09/21  0529   WBC 7.4 6.7   HGB 9.4* 9.8*   HCT 28.4* 29.4*    313        Recent Labs     03/09/21  0529   *   K 3.4*   CL 95*   CO2 31   BUN 19   CREATININE 1.1        No results for input(s): PROT, INR, APTT in the last 72 hours. FOCUSED LEFT LOWER EXTREMITY EXAMINATION:    No strikethrough drainage noted to external dressing layer. Mild-moderate sanguinous strikethrough drainage noted to internal dressing layer. Lorean Snare VASCULAR: DP and PT pulses nonpalpable. Upon hand-held Doppler monophasic signals noted DP and PT B/L LE. CFT less than 3 seconds to the distal stump. Skin temperature is warm to lukewarm from proximal to distal with no focal calor noted. No edema noted. No pain with calf compression.     NEUROLOGIC: Gross and epicritic sensation is diminished. Protective sensation is diminished at all pedal sites.     DERMATOLOGIC: Dermatological changes noted B/L LE. Surgical incision noted at the distal stump of the left foot with intact sutures and no dehiscence noted. No fluctuance, crepitus, malodor, or drainage noted. No clinical signs of infection noted.   Erythema 2/2 postoperative surgery. 3/10/2021    Patient gave verbal consent for the picture taken at today's visit. MUSCULOSKELETAL: Muscle strength is 5/5 for all pedal groups tested. No pain with palpation of the foot or ankle. History of BKA to RLE, TMA LLE. IMAGING:  Narrative   XR FOOT LEFT (2 VIEWS)       Indication: assess for underlying gas formation at recent amputation site        COMPARISON: None       Findings: AP and lateral views of the left foot were obtained. Transmetatarsal amputation of all digits is noted. There is overlying packing material at the surgical site.       Vascular calcification is noted. There is no comparison study to immediate postoperative radiographs or presurgical appearance of the foot. No discrete soft tissue gas.       If concerned for ongoing infection, MRI without and with contrast is recommend along with clinical correlation with lab parameters.            Impression   Impression: Limited study, as above. No comparisons are available. ASSESSMENT/PLAN  S/P Revision of Transmetatarsal amputation due to osteomyelitis, tendo-achilles lengthening, delayed primary closure; left LE (DOS: 3/4/21)  Peripheral arterial disease  History of BKA; RLE  History of TMA; LLE  Type 1 DM with peripheral neuropathy        -Patient seen and examined this a.m.  -Hypertensive, afebrile, no leukocytosis noted.  -All imaging reviewed, see impression above.  -Wound culture- enterobacter cloacae, proteus mirabilis. -Surgical path, Each metatarsal sample with acute osteomyelitis, the largest and smallest florid. -Dressing change to left lower extremity deferred.   -ID following, PICC line placed with infusion orders Invanz 1 g daily through 3/31  -Nonweightbearing to left lower extremity.  -Patient is fully weightbearing to the right lower extremity with prosthesis and as tolerated with assistance.  -Patient instructed strict follow-up with Dr. Mariam Faustin 1 week after being discharged from the

## 2021-03-10 NOTE — PROGRESS NOTES
Patient very upset about not being able to go to rehab facility again today, now refusing telemetry to be put back on. On call Hospitalist made aware.

## 2021-03-10 NOTE — CARE COORDINATION
Case Management            Discharge Note                    Date / Time of Note: 3/10/2021 1:50 PM                  Discharge Note Completed by: Anneliese Stanley Day    Patient Name: Estela Prakash   YOB: 1960  Diagnosis: Wound infection [T14. 8XXA, L08.9]   Date / Time: 3/1/2021  3:05 PM    Current PCP: Zeeshan Caceres DO  Clinic patient: Yes    Hospitalization in the last 30 days: No    Advance Directives:  Code Status: Full Code  [place] DNR form completed and on chart: Not Indicated    Financial:  Payor: Denzel Whitten / Plan: Timoteo Curry / Product Type: *No Product type* /      Pharmacy:  No Pharmacies [id number] [employer] medications?: Potential Assistance Purchasing Medications: Yes  Assistance provided by Case Management: None at this time    Does patient want to participate in local refill/ meds to beds program?: Yes    Meds To Beds General Rules:  1. Can ONLY be done Monday- Friday between 8:30am-5pm  2. Prescription(s) must be in pharmacy by 3pm to be filled same day  3. Copy of patient's insurance/ prescription drug card and patient face sheet must be sent along with the prescription(s)  4. Cost of Rx cannot be added to hospital bill. If financial assistance is needed, please contact unit  or ;  or  CANNOT provide pharmacy voucher for patients co-pays  5. Patients can then  the prescription on their way out of the hospital at discharge, or pharmacy can deliver to the bedside if staff is available. (payment due at time of pick-up or delivery - cash, check, or card accepted)     Able to afford home medications/ co-pay costs: Yes    ADLS:  Current PT AM-PAC Score: 6 /24  Current OT AM-PAC Score: 16 /24      Discharge Disposition: [facility] ([facility]):   [facility].   [address] [facility] [place], [address]        Phone: [phone]      Fax: [phone]. LOC at discharge: Skilled  JONAH Completed: Yes    Notification completed in HENS/PAS?:  No, will complete in follow up. IMM Completed:   No    Transportation:  Transportation Plan for discharge: EMS transportation   Mode of Transport: Ambulance stretcher - BLS    Reason for medical transport: Other: BKA and AKA  Name of [address] [place] [address], [address]: [facility]  Phone: [phone]  Transport Time: 5:00 pm     Transportation form completed: Yes    Referrals made at [employer] for outpatient continued care:  Not Applicable    Additional CM Notes:   SW met with patient at bedside. Patient medically ready for discharge. Transport arranged with [facility] for 5:00 pm. SW faxed clinicals. No other needs at this time. The Plan for Transition of Care is related to the following treatment goals Wound infection [T14. 8XXA, L08.9]      The Patient and/or patient representative Patient  was provided with a choice of provider and agrees with the discharge plan Yes    Freedom of choice list was provided with basic dialogue that supports the patient's individualized plan of care/goals and shares the quality data associated with the providers.  Yes    Care Transitions patient: No    RENETTA March  [facility]  Case Management Department  Ph: [phone]

## 2021-03-10 NOTE — PLAN OF CARE
Problem: Skin Integrity:  Goal: Will show no infection signs and symptoms  Description: Will show no infection signs and symptoms  Outcome: Ongoing     Problem: Skin Integrity:  Goal: Absence of new skin breakdown  Description: Absence of new skin breakdown  Outcome: Ongoing     Problem: Falls - Risk of:  Goal: Will remain free from falls  Description: Will remain free from falls  Outcome: Completed     Problem: Falls - Risk of:  Goal: Absence of physical injury  Description: Absence of physical injury  Outcome: Completed     Problem: Pain:  Goal: Pain level will decrease  Outcome: Completed     Problem: Pain:  Goal: Control of acute pain  Description: Control of acute pain  Outcome: Completed

## 2021-03-10 NOTE — CARE COORDINATION
Case Management Daily Note                    Date: 3/10/2021     Patient Name: Gemini Godwin    Date of Admission: 3/1/2021  3:05 PM  YOB: 1960    Length of Stay: 9         Patient Admission Status: Inpatient  Diagnosis:Wound infection [T14. 8XXA, L08.9]     ________________________________________________________________________________________  Discharge Plan: SNF:   [facility]. [address] [place] [address]       Phone: [phone]      Fax: [phone]. Insurance: Payor: Festus Zhou / Plan: Celina WILLSON COMPLETE / Product Type: *No Product type* /   Is pre-cert/notification needed: Yes, Pre-cert needed     Tentative discharge date: 3/10 at 5:00 pm     Current barriers: Pre-cert should come back today. Referrals completed: SNF: Multiple Referrals     Resources/ information provided: SNF List   ________________________________________________________________________________________  PT AM-PAC: 6 / 24 per last evaluation on: 3/5    OT AM-PAC: 12 / 24 per last evaluation on: 3/5    DME Needs for discharge: defer  ________________________________________________________________________________________  Notes/Plan of Care:   LAKHWINDER rounded on this date. Plan is for discharge at 5:00 pm with [facility]. LAKHWINDER spoke with [employer] [phone] and provided update on transportation time. Patient's prosthesis was brought to the hospital to aide in rehab. DaughterNicolas is aware of plan for discharge to [facility]. UPDATE: 10:40 am LAKHWINDER faxed Therapy notes for pre-cert. Stephen and/or his family were provided with choice of provider; he and/or his family are in agreement with the discharge plan at this time.     Care Transition Patient: Rachelle Trevizo, MSW  [facility]   Case Management Department  Ph: [phone]

## 2021-03-10 NOTE — PROGRESS NOTES
Hospitalist Progress Note      PCP: Karina Merritt DO    Date of Admission: 3/1/2021    Chief Complaint: left foot wound    Subjective: doing well, no concerns today, has not had a bowel movement but says he wants to go naturally       Medications:  Reviewed    Infusion Medications    dextrose       Scheduled Medications    insulin glargine  70 Units Subcutaneous BID    insulin lispro  0-12 Units Subcutaneous TID WC    insulin lispro  0-6 Units Subcutaneous Nightly    clopidogrel  75 mg Oral Daily    guaiFENesin  600 mg Oral BID    [Held by provider] meropenem  1,000 mg Intravenous Q8H    sodium chloride flush  10 mL Intravenous 2 times per day    furosemide  40 mg Oral BID    enoxaparin  40 mg Subcutaneous Daily    sodium chloride flush  10 mL Intravenous 2 times per day    melatonin  5 mg Oral Nightly    aspirin  81 mg Oral Daily    cholestyramine light  4 g Oral Daily    DULoxetine  90 mg Oral Daily    gabapentin  300 mg Oral TID    metoprolol succinate  12.5 mg Oral BID    losartan  25 mg Oral Daily    pantoprazole  40 mg Oral BID    pramipexole  0.5 mg Oral Nightly    atorvastatin  20 mg Oral Nightly    tamsulosin  0.8 mg Oral Daily    sodium chloride flush  10 mL Intravenous 2 times per day     PRN Meds: glucose, dextrose, glucagon (rDNA), dextrose, oxyCODONE-acetaminophen **OR** oxyCODONE-acetaminophen, sodium chloride flush, benzocaine-menthol, acetaminophen, ipratropium-albuterol, sodium chloride flush, promethazine **OR** ondansetron, polyethylene glycol, acetaminophen **OR** acetaminophen      Intake/Output Summary (Last 24 hours) at 3/10/2021 1619  Last data filed at 3/10/2021 1429  Gross per 24 hour   Intake 1590 ml   Output 1300 ml   Net 290 ml       Physical Exam Performed:    /65   Pulse 71   Temp 97.1 °F (36.2 °C) (Oral)   Resp 20   Ht 5' 6\" (1.676 m)   Wt 274 lb 4 oz (124.4 kg)   SpO2 97%   BMI 44.27 kg/m²     General appearance: NAD, morbidly obese male  HEENT: Pupils equal, round, and reactive to light. Conjunctivae/corneas clear. Neck: Supple, with full range of motion. No jugular venous distention. Trachea midline. Respiratory: Bilateral significant rhonchorous breathing sounds only some interval improvement  Cardiovascular: Regular rate and rhythm with normal S1/S2 without murmurs, rubs or gallops. Abdomen: Soft, non-tender, non-distended with normal bowel sounds. Musculoskeletal: Right AKA covered with dressing, left foot metatarsal amputation covered with dressing no obvious sign of discharge.  Pictures from podiatry note reviewed  Bilateral upper and lower extremity edema  Neurologic:  Neurovascularly intact without any focal sensory/motor deficits. Cranial nerves: II-XII intact, grossly non-focal.  Psychiatric: Alert and oriented, thought content appropriate, normal insight  Capillary Refill: Brisk,< 3 seconds   Peripheral Pulses: +2 palpable, equal bilaterally       Labs:   Recent Labs     03/08/21  0540 03/09/21  0529 03/10/21  0457   WBC 7.4 6.7 8.0   HGB 9.4* 9.8* 10.2*   HCT 28.4* 29.4* 31.1*    313 341     Recent Labs     03/08/21  0540 03/09/21  0529 03/10/21  0457   * 135* 137   K 4.1 3.4* 3.9   CL 94* 95* 99   CO2 28 31 29   BUN 20 19 15   CREATININE 1.2 1.1 1.1   CALCIUM 8.4 8.7 8.8     No results for input(s): AST, ALT, BILIDIR, BILITOT, ALKPHOS in the last 72 hours. No results for input(s): INR in the last 72 hours. No results for input(s): Dionisio Clap in the last 72 hours. Urinalysis:    No results found for: Lincoln Mick, BACTERIA, RBCUA, BLOODU, SPECGRAV, Pool [address]    Radiology:  XR CHEST PORTABLE   Final Result      Mild diffuse interstitial edema or pneumonia. XR ABDOMEN (KUB) (SINGLE AP VIEW)   Final Result      Nonspecific bowel gas pattern. XR FOOT LEFT (MIN 3 VIEWS)   Final Result      Further amputation of the metatarsals without complicating features.       XR CHEST (2 VW)   Final Result Cardiomegaly. Sternotomy wires are noted. Clear lungs. XR FOOT LEFT (2 VIEWS)   Final Result   Impression: Limited study, as above. No comparisons are available. IR ANGIOGRAM EXTREMITY LEFT    (Results Pending)   IR ANGIOGRAM EXTREMITY LEFT    (Results Pending)   LASER SKIN PERFUSION PRESSURE STUDY    (Results Pending)           Assessment/Plan:    Active Hospital Problems    Diagnosis Date Noted    Open wound of left foot [S91.302A] 03/02/2021    Wound dehiscence, surgical Pepe Ally 03/02/2021    Rupture of operation wound [T81.31XA]     Wound infection [T14. 8XXA, L08.9] 03/01/2021     76-year-old male history of diabetes type 1, peripheral arterial disease, CAD, right BKA, left foot transmetatarsal amputation 5 weeks ago at [facility], sutures removed 3 weeks ago, came to the ED with complaint of wound dehiscence  He underwent peripheral diagnostic angiogram on 3/2/2021 which showed 99% stenosis of the left anterior tibial artery with subtotal occlusion of the left distal dorsalis pedis artery, successful AT/anterior plantar artery angioplasty on 3/3. SP TRANSMETATARSAL AMPUTATION WITH DELAYED PRIMARY CLOSURE, TENDOACHILLES LENGTHENING . LEFT LOWER EXTREMITY 3/4     He had postoperative hypoxemia, likely volume overloaded/acute on chronic diastolic congestive heart failure patient was diuresed with IV Lasix.   In discharge on home dose of Lasix.     Hypokalemia - replacing as needed    Left lower extremity wound dehiscence/critical limb ischemia; patient will follow up with podiatry as outpatient, due to limb threatening nature of patient's situation, recommended IV antibiotics to lower the risk of dehiscence, per ID recommendation Invanz 1 g IV daily through 3/21.     For type 1 diabetes, continue Lantus 70 units bid, sliding scale insulin, Hypoglycemia protocol     CAD s/p CABG  Continue losartan metoprolol Lasix statin     Morbid obesity excess calories  Counseled on weight loss    DVT Prophylaxis: Lovenox  Diet: DIET CARB CONTROL;  Code Status: Full Code    PT/OT Eval Status: on going    Dispo - pending precert, can be Baby MD Roxana  Hospitalist

## 2021-03-10 NOTE — CARE COORDINATION
Floor 6S calling this CM regarding transport question. [facility] here for patient and the facility called and said they did not have precert yet. [facility] has been cancelled in patient room. Hiram (DAINA) has left for the day. Electronically signed by Alicia Hebert RN on 3/10/2021 at 5:04 PM      Addendum: Patient called and was very angry and stated he was very disappointed that this has happened 2 days in a row. CM will continue to follow patient until discharge.   Electronically signed by Alicia Hebert RN on 3/10/2021 at 5:16 PM

## 2021-03-10 NOTE — PROGRESS NOTES
Patient alert and oriented times four. Patient vss this shift. Patient on Telemetry nsr. Patient has picc line in left upper arm. Patient is chair fast. Patient using the urinal and bedpan if necessary. patient complains of his blood glucose being high and requests to receive extra insulin. Messaged doctor alfred. Orders received for 5 units of insulin in conjunction with sliding scale. Patient adbominal sounds active. Patient has multiple surgical sites. bka to right side, lefts transmetatarsal, and angio site in right leg. Patient breath sounds diminished. Patient chair locked in lowest position with chair alarm on. Call light bedside table and belongings in reach. Patient encouraged to call with any and all needs. Patient verbalizes understanding and call appropriately. Will continue to monitor.

## 2021-03-11 VITALS
HEART RATE: 72 BPM | WEIGHT: 274.25 LBS | DIASTOLIC BLOOD PRESSURE: 63 MMHG | OXYGEN SATURATION: 94 % | RESPIRATION RATE: 18 BRPM | BODY MASS INDEX: 44.08 KG/M2 | HEIGHT: 66 IN | TEMPERATURE: 98.1 F | SYSTOLIC BLOOD PRESSURE: 144 MMHG

## 2021-03-11 LAB
ANION GAP SERPL CALCULATED.3IONS-SCNC: 7 MMOL/L (ref 3–16)
BASOPHILS ABSOLUTE: 0.1 K/UL (ref 0–0.2)
BASOPHILS RELATIVE PERCENT: 0.7 %
BUN BLDV-MCNC: 13 MG/DL (ref 7–20)
CALCIUM SERPL-MCNC: 8.7 MG/DL (ref 8.3–10.6)
CHLORIDE BLD-SCNC: 97 MMOL/L (ref 99–110)
CO2: 31 MMOL/L (ref 21–32)
CREAT SERPL-MCNC: 1 MG/DL (ref 0.8–1.3)
EOSINOPHILS ABSOLUTE: 0.6 K/UL (ref 0–0.6)
EOSINOPHILS RELATIVE PERCENT: 8.3 %
GFR AFRICAN AMERICAN: >60
GFR NON-AFRICAN AMERICAN: >60
GLUCOSE BLD-MCNC: 125 MG/DL (ref 70–99)
GLUCOSE BLD-MCNC: 140 MG/DL (ref 70–99)
GLUCOSE BLD-MCNC: 156 MG/DL (ref 70–99)
HCT VFR BLD CALC: 31.6 % (ref 40.5–52.5)
HEMOGLOBIN: 10.2 G/DL (ref 13.5–17.5)
LYMPHOCYTES ABSOLUTE: 1.8 K/UL (ref 1–5.1)
LYMPHOCYTES RELATIVE PERCENT: 24.3 %
MCH RBC QN AUTO: 27.5 PG (ref 26–34)
MCHC RBC AUTO-ENTMCNC: 32.2 G/DL (ref 31–36)
MCV RBC AUTO: 85.3 FL (ref 80–100)
MONOCYTES ABSOLUTE: 0.5 K/UL (ref 0–1.3)
MONOCYTES RELATIVE PERCENT: 6.8 %
NEUTROPHILS ABSOLUTE: 4.4 K/UL (ref 1.7–7.7)
NEUTROPHILS RELATIVE PERCENT: 59.9 %
PDW BLD-RTO: 15.1 % (ref 12.4–15.4)
PERFORMED ON: ABNORMAL
PERFORMED ON: ABNORMAL
PLATELET # BLD: 341 K/UL (ref 135–450)
PMV BLD AUTO: 7.1 FL (ref 5–10.5)
POTASSIUM SERPL-SCNC: 3.9 MMOL/L (ref 3.5–5.1)
RBC # BLD: 3.71 M/UL (ref 4.2–5.9)
SODIUM BLD-SCNC: 135 MMOL/L (ref 136–145)
WBC # BLD: 7.3 K/UL (ref 4–11)

## 2021-03-11 PROCEDURE — 2580000003 HC RX 258: Performed by: INTERNAL MEDICINE

## 2021-03-11 PROCEDURE — 6370000000 HC RX 637 (ALT 250 FOR IP): Performed by: INTERNAL MEDICINE

## 2021-03-11 PROCEDURE — 99232 SBSQ HOSP IP/OBS MODERATE 35: CPT | Performed by: INTERNAL MEDICINE

## 2021-03-11 PROCEDURE — 6370000000 HC RX 637 (ALT 250 FOR IP): Performed by: PODIATRIST

## 2021-03-11 PROCEDURE — 80048 BASIC METABOLIC PNL TOTAL CA: CPT

## 2021-03-11 PROCEDURE — 6360000002 HC RX W HCPCS: Performed by: PODIATRIST

## 2021-03-11 PROCEDURE — 85025 COMPLETE CBC W/AUTO DIFF WBC: CPT

## 2021-03-11 RX ADMIN — LOSARTAN POTASSIUM 25 MG: 25 TABLET, FILM COATED ORAL at 09:17

## 2021-03-11 RX ADMIN — TAMSULOSIN HYDROCHLORIDE 0.8 MG: 0.4 CAPSULE ORAL at 09:17

## 2021-03-11 RX ADMIN — GABAPENTIN 300 MG: 300 CAPSULE ORAL at 09:16

## 2021-03-11 RX ADMIN — ENOXAPARIN SODIUM 40 MG: 40 INJECTION SUBCUTANEOUS at 09:17

## 2021-03-11 RX ADMIN — CLOPIDOGREL BISULFATE 75 MG: 75 TABLET, FILM COATED ORAL at 09:16

## 2021-03-11 RX ADMIN — FUROSEMIDE 40 MG: 40 TABLET ORAL at 09:17

## 2021-03-11 RX ADMIN — INSULIN LISPRO 2 UNITS: 100 INJECTION, SOLUTION INTRAVENOUS; SUBCUTANEOUS at 12:15

## 2021-03-11 RX ADMIN — METOPROLOL SUCCINATE 12.5 MG: 25 TABLET, EXTENDED RELEASE ORAL at 09:17

## 2021-03-11 RX ADMIN — INSULIN GLARGINE 70 UNITS: 100 INJECTION, SOLUTION SUBCUTANEOUS at 09:21

## 2021-03-11 RX ADMIN — GUAIFENESIN 600 MG: 600 TABLET, EXTENDED RELEASE ORAL at 09:17

## 2021-03-11 RX ADMIN — DULOXETINE HYDROCHLORIDE 90 MG: 60 CAPSULE, DELAYED RELEASE ORAL at 09:16

## 2021-03-11 RX ADMIN — ASPIRIN 81 MG: 81 TABLET, FILM COATED ORAL at 09:17

## 2021-03-11 RX ADMIN — PANTOPRAZOLE SODIUM 40 MG: 40 TABLET, DELAYED RELEASE ORAL at 09:17

## 2021-03-11 RX ADMIN — Medication 10 ML: at 09:20

## 2021-03-11 RX ADMIN — CHOLESTYRAMINE 4 G: 4 POWDER, FOR SUSPENSION ORAL at 09:17

## 2021-03-11 ASSESSMENT — PAIN SCALES - GENERAL
PAINLEVEL_OUTOF10: 0
PAINLEVEL_OUTOF10: 0

## 2021-03-11 NOTE — CARE COORDINATION
CM  rounded on this date. Plan is for discharge at 1230 pm with [facility]. [phone] CM spoke with Alban Reaves Admissions at SNF  [phone] and provided update on transportation time and confirmed with her that pre cert was received. And can accept the patient :  DAINA met w/ patient  And  Updated:  He is very motivated  To get to rehab . CM arranged  Transport  With [facility]  12:30 PM  , patient and RN Marily adan RN to call report  , DAINA faxed  [address] . Covid (-) , testing from 03/08/2021 acceptable,   IMM completed  . HENS  Submitted :  Document ID: [id number]     Discharge Disposition: [place] (SNF):   [facility]. [address] [place] [address] Road [address]        Phone: [phone]      Fax: [phone]. AVS/ JONAH     Electronically signed by Nannette Godinez RN on 3/11/2021 at 10:30 AM       Nannette Godinez RN Case Manager  [facility]  [address] [place] [address].   [facility] [id number]  [phone]  Fax [phone]

## 2021-03-11 NOTE — PROGRESS NOTES
ID Follow-up NOTE    CC:   Dehiscence / infection of L TMA amputation site  Antibiotics: Meropenem    Admit Date: 3/1/2021  Hospital Day: 11    Subjective:     Pt has no complaint today  Denies L foot / surg site pain at present    Objective:     Patient Vitals for the past 8 hrs:   BP Temp Temp src Pulse Resp SpO2   03/11/21 0816 (!) 144/63 98.1 °F (36.7 °C) Oral 72 18 94 %   03/11/21 0304 137/63 96.9 °F (36.1 °C) Oral 71 18 96 %     I/O last 3 completed shifts: In: 840 [P.O.:840]  Out: 1125 [Urine:1125]  No intake/output data recorded. EXAM:  GENERAL: No apparent distress.   RA  HEENT: Membranes moist, no oral lesion  NECK:  Supple, no lymphadenopathy  LUNGS: Clear b/l, no rales, no dullness  CARDIAC: RRR, no murmur appreciated  ABD:  + BS, soft / NT  EXT:  R BKA healed; L foot stump with dressing /ACE  NEURO: No focal neurologic findings  PSYCH: Orientation, sensorium, mood normal  LINES:  L PICC in place       Data Review:  Lab Results   Component Value Date    WBC 7.3 03/11/2021    HGB 10.2 (L) 03/11/2021    HCT 31.6 (L) 03/11/2021    MCV 85.3 03/11/2021     03/11/2021     Lab Results   Component Value Date    CREATININE 1.0 03/11/2021    BUN 13 03/11/2021     (L) 03/11/2021    K 3.9 03/11/2021    CL 97 (L) 03/11/2021    CO2 31 03/11/2021       Hepatic Function Panel: No results found for: ALKPHOS, ALT, AST, PROT, BILITOT, BILIDIR, IBILI, LABALBU    MICRO:  3/3 COVID-19, rapid - not detected  3/3 Cx, Wound - Enterobacter cloacae; Proteus mirabilis (heavy growth)  Proteus mirabilis   Antibiotic Interpretation HAYLIE   ampicillin Sensitive <=8 mcg/mL   ceFAZolin Intermediate 4 mcg/mL   cefepime Sensitive <=2 mcg/mL   cefTRIAXone Sensitive <=1 mcg/mL   cefuroxime Sensitive <=4 mcg/mL   ciprofloxacin Resistant >2 mcg/mL   ertapenem Sensitive <=0.5 mcg/mL   gentamicin Intermediate 8 mcg/mL   meropenem Sensitive <=1 mcg/mL   piperacillin-tazobactam Sensitive <=16 mcg/mL   tobramycin Sensitive <=4 mcg/mL trimethoprim-sulfamethoxazole Resistant >2/38 mcg/mL     Enterobacter cloacae complex   Antibiotic Interpretation HAYLIE   amoxicillin-clavulanate Resistant >16/8 mcg/mL   ampicillin Resistant >16 mcg/mL   ceFAZolin Resistant >16 mcg/mL   cefepime Sensitive <=2 mcg/mL   cefTRIAXone Intermediate 2 mcg/mL   cefuroxime Sensitive 8 mcg/mL   ciprofloxacin Sensitive <=1 mcg/mL   ertapenem Sensitive <=0.5 mcg/mL   gentamicin Sensitive <=4 mcg/mL   meropenem Sensitive <=1 mcg/mL   piperacillin-tazobactam Sensitive <=16 mcg/mL   trimethoprim-sulfamethoxazole Sensitive <=2/38 mcg/mL     3/4 Surgical Pathology -  Left foot, transmetatarsal amputation revision:      - Acute and gangrenous necrosis, skin and soft tissues, with calcific        arteriolar atherosclerosis, favor focally thrombosed.      - Each metatarsal sample with acute osteomyelitis, the largest and        smallest florid.      - Separate areas of bony repair/regeneration. IMAGING:  3/1 XR L Foot -   Vascular calcification is noted. There is no comparison study to immediate postoperative radiographs or presurgical appearance of the foot. No discrete soft tissue gas.       If concerned for ongoing infection, MRI without and with contrast is recommend.      3/2 IR Angiogram LEFT lower extremity -   1. abdominal aorta is patent with patent mesenteric's and renal vessels. 2. left common femoral artery profunda femoris is patent. 3.  Left superficial femoral arteries patent with patent left popliteal artery. 4.  Left anterior tibial artery distal 99% stenosis with subtotal occlusion of the distal left dorsalis pedis artery. 5.  Left peroneal artery occludes distally. 6.  Left posterior tibial artery is occluded at the ostium. 7.  Plantar artery is occluded with incomplete plantar loop.     3/3 CXR - Cardiomegaly; Sternotomy wires noted; Clear lungs    3/3 IR Angio LEFT lower extremity    3/4 XR Left Foot - Further amputation of the metatarsals without complicating features    3/6 CXR - Mild diffuse interstitial edema or pneumonia    3/6 XR KUB - Nonspecific bowel gas pattern    Scheduled Meds:   insulin glargine  70 Units Subcutaneous BID    insulin lispro  0-12 Units Subcutaneous TID WC    insulin lispro  0-6 Units Subcutaneous Nightly    clopidogrel  75 mg Oral Daily    guaiFENesin  600 mg Oral BID    meropenem  1,000 mg Intravenous Q8H    sodium chloride flush  10 mL Intravenous 2 times per day    furosemide  40 mg Oral BID    enoxaparin  40 mg Subcutaneous Daily    sodium chloride flush  10 mL Intravenous 2 times per day    melatonin  5 mg Oral Nightly    aspirin  81 mg Oral Daily    cholestyramine light  4 g Oral Daily    DULoxetine  90 mg Oral Daily    gabapentin  300 mg Oral TID    metoprolol succinate  12.5 mg Oral BID    losartan  25 mg Oral Daily    pantoprazole  40 mg Oral BID    pramipexole  0.5 mg Oral Nightly    atorvastatin  20 mg Oral Nightly    tamsulosin  0.8 mg Oral Daily    sodium chloride flush  10 mL Intravenous 2 times per day       Continuous Infusions:   dextrose         PRN Meds:  glucose, dextrose, glucagon (rDNA), dextrose, oxyCODONE-acetaminophen **OR** oxyCODONE-acetaminophen, sodium chloride flush, benzocaine-menthol, acetaminophen, ipratropium-albuterol, sodium chloride flush, promethazine **OR** ondansetron, polyethylene glycol, acetaminophen **OR** acetaminophen      Assessment:     62 yo DM, neuropathy, PVD, CAD / CABG, R BKA  Hx L LE PVD  Recent L TMA - 1/7/21      Pt had dehiscence of L TMA wound weeks / months ago (pt is vague historian)  No drainage, no pain. High BS     Admit 3/1 -   Seen by Podiatry, Cardiology  Reviewed images of stump wound.   Exposed MT 1/2/3 per Podiatry note  Started on vancomycin + cefepime  L LE cath 3/2 with L ant tib a stenosis, L peroneal / post tib a occlusion    3/3 - L LE angio with L ant tib and plantar a balloon angioplasty    3/4 - Revision L TMA with secondary closure     IMP/  Mult co-morbidities including DM, PVD, R BKA  L TMA dehiscence with exposed MT, cult + P mirabilis, E cloacae  PVD    S/p revascularization, TMA revision, closure   Due to limb threatening nature of pt's situation, will recommend iv antibiotic to lower risk dehiscence, need for further revision or limb loss. Plan:     Cont meropenem  Would care per per Podiatry      See JONAH, can rx with invanz after discharge, will need 1st dose prior to discahge    Discussed with pt  Alejandrina Magallon MD    INFUSION ORDERS:  Invanz 1 gm iv daily through 3/31  - First dose given in hospital - 3/10  - PICC   - Disposition / date discharge - [facility] [phone] / 3/11  - Check CBC w diff, CMP, ESR, CRP every Mon or Tue - FAX result to [phone]  - Call with antibiotic / infusion issues, [phone]  - If facility - call with any change in status, transfer out of facility or to hospital - [phone]  - No f/u in outpatient ID office necessary. F/u Podiatry, interventional Card after discharge    [facility].   [address] [place] [address] Road [address]        Phone: [phone]      Fax: [phone]

## 2021-03-11 NOTE — PROGRESS NOTES
Patient discharged to [facility]. PICC line in place for IV antibiotics. Prosthesis on right leg. Dressing is clean dry and intact to left leg. All belongings packed and sent with patient including his CPAP machine. JONAH completed. Picked up by ambulance for transport.

## 2021-03-11 NOTE — PROGRESS NOTES
Podiatric Surgery Daily Progress Note  Stephen Fish      Subjective :   Patient seen and examined at his reclining chair this a.m. Patient denies nausea, vomiting, fever, chills, shortness of breath. Patient was just wondering when he can go home. Review of Systems: A 10 point review of systems was conducted, significant findings as noted in HPI. All other systems negative. Objective     /63   Pulse 71   Temp 96.9 °F (36.1 °C) (Oral)   Resp 18   Ht 5' 6\" (1.676 m)   Wt 274 lb 4 oz (124.4 kg)   SpO2 96%   BMI 44.27 kg/m²     I/O:    Intake/Output Summary (Last 24 hours) at 3/11/2021 0515  Last data filed at 3/11/2021 0304  Gross per 24 hour   Intake 1470 ml   Output 1425 ml   Net 45 ml              Wt Readings from Last 3 Encounters:   No data found for Wt       LABS:    Recent Labs     03/09/21  0529 03/10/21  0457   WBC 6.7 8.0   HGB 9.8* 10.2*   HCT 29.4* 31.1*    341        Recent Labs     03/10/21  0457      K 3.9   CL 99   CO2 29   BUN 15   CREATININE 1.1        No results for input(s): PROT, INR, APTT in the last 72 hours. FOCUSED LEFT LOWER EXTREMITY EXAMINATION:    Dressing to the left lower extremity remains clean, dry, and intact. Patient able to move ankle without any pain or crepitus. IMAGING:  Narrative   XR FOOT LEFT (2 VIEWS)       Indication: assess for underlying gas formation at recent amputation site        COMPARISON: None       Findings: AP and lateral views of the left foot were obtained. Transmetatarsal amputation of all digits is noted. There is overlying packing material at the surgical site.       Vascular calcification is noted. There is no comparison study to immediate postoperative radiographs or presurgical appearance of the foot.  No discrete soft tissue gas.       If concerned for ongoing infection, MRI without and with contrast is recommend along with clinical correlation with lab parameters.            Impression   Impression: Limited

## 2021-03-11 NOTE — PLAN OF CARE
Problem: Skin Integrity:  Goal: Will show no infection signs and symptoms  Description: Will show no infection signs and symptoms  Outcome: Ongoing     Problem: Skin Integrity:  Goal: Absence of new skin breakdown  Description: Absence of new skin breakdown  Outcome: Ongoing  Note: No new skin breakdown noted this PM.

## 2021-03-12 LAB
ALBUMIN SERPL-MCNC: 3.2 G/DL
ALP BLD-CCNC: 385 U/L
ALT SERPL-CCNC: 21 U/L
ANION GAP SERPL CALCULATED.3IONS-SCNC: NORMAL MMOL/L
AST SERPL-CCNC: 24 U/L
BASOPHILS ABSOLUTE: 0.1 /ΜL
BASOPHILS RELATIVE PERCENT: 0.7 %
BILIRUB SERPL-MCNC: 0.3 MG/DL (ref 0.1–1.4)
BUN BLDV-MCNC: 15 MG/DL
CALCIUM SERPL-MCNC: 8.8 MG/DL
CHLORIDE BLD-SCNC: 97 MMOL/L
CO2: 28 MMOL/L
CREAT SERPL-MCNC: 1 MG/DL
EOSINOPHILS ABSOLUTE: 0.4 /ΜL
EOSINOPHILS RELATIVE PERCENT: 5 %
GFR CALCULATED: NORMAL
GLUCOSE BLD-MCNC: 42 MG/DL
HCT VFR BLD CALC: 32.4 % (ref 41–53)
HEMOGLOBIN: 10.4 G/DL (ref 13.5–17.5)
LYMPHOCYTES ABSOLUTE: 2.4 /ΜL
LYMPHOCYTES RELATIVE PERCENT: 29.2 %
MCH RBC QN AUTO: 27.3 PG
MCHC RBC AUTO-ENTMCNC: 32.2 G/DL
MCV RBC AUTO: 84.7 FL
MONOCYTES ABSOLUTE: 0.6 /ΜL
MONOCYTES RELATIVE PERCENT: 7.8 %
NEUTROPHILS ABSOLUTE: 4.8 /ΜL
NEUTROPHILS RELATIVE PERCENT: 57.3 %
PDW BLD-RTO: 14.9 %
PLATELET # BLD: 392 K/ΜL
PMV BLD AUTO: 7.4 FL
POTASSIUM SERPL-SCNC: 3.8 MMOL/L
RBC # BLD: 3.82 10^6/ΜL
SODIUM BLD-SCNC: 134 MMOL/L
TOTAL PROTEIN: 6.7
WBC # BLD: 8.4 10^3/ML

## 2021-03-15 LAB
BASOPHILS ABSOLUTE: 0.1 /ΜL
BASOPHILS RELATIVE PERCENT: 1.3 %
C-REACTIVE PROTEIN: 16.9
EOSINOPHILS ABSOLUTE: 0.4 /ΜL
EOSINOPHILS RELATIVE PERCENT: 5.9 %
HCT VFR BLD CALC: 31.7 % (ref 41–53)
HEMOGLOBIN: 10.2 G/DL (ref 13.5–17.5)
LYMPHOCYTES ABSOLUTE: 1.8 /ΜL
LYMPHOCYTES RELATIVE PERCENT: 24.9 %
MCH RBC QN AUTO: 27 PG
MCHC RBC AUTO-ENTMCNC: 32.1 G/DL
MCV RBC AUTO: 84 FL
MONOCYTES ABSOLUTE: 0.5 /ΜL
MONOCYTES RELATIVE PERCENT: 7.3 %
NEUTROPHILS ABSOLUTE: 4.4 /ΜL
NEUTROPHILS RELATIVE PERCENT: 60.6 %
PDW BLD-RTO: 14.7 %
PLATELET # BLD: 329 K/ΜL
PMV BLD AUTO: 8.2 FL
RBC # BLD: 3.77 10^6/ΜL
SEDIMENTATION RATE, ERYTHROCYTE: 80
WBC # BLD: 7.2 10^3/ML

## 2021-03-15 NOTE — TELEPHONE ENCOUNTER
Called and left VM on mobile notifying Stephen that he has a laser perfusion study scheduled on 4/1/21 @ 10:15 am. Pt is also scheduled for a f/up on 4/7/21 @ 9:45 am. Called the home phone that was listed and was informed that Araseli Harris is currently in a rehab facility in [facility]. He was unsure where it was.

## 2021-03-15 NOTE — TELEPHONE ENCOUNTER
Please call Stephen to schedule the Laser perfusion study, orders have been placed. Please schedule a follow up with Dr. Jahaira Martin after the perfusion study has been completed.

## 2021-03-16 NOTE — TELEPHONE ENCOUNTER
Attempted to call home number, no answer, unable to leave a message. Patient discharged to [facility] transitional care [phone]. Called and spoke with nurse Mary Li, she states he has been admitted to their skilled unit and she is unable to determine his date of discharge. She states they are located in [place] and will arrange for transportation if needed by try to avoid it if able to especially since we are located in [place]. Instructed I would verify with Dr. Selena Morgan timing of the dopplers along with follow up. Will return call with his recommendations. She v/u.

## 2021-03-18 NOTE — TELEPHONE ENCOUNTER
Tried calling number but it was busy. I cancelled the testing and office visit per Jignesh Arzate. [address] [facility] desk, please try again tomorrow or Monday, as I am off Friday-Monday to let them know both have been cancelled and let them know to call us if pt decided he wants to follow up with us or stay in [place].

## 2021-03-18 NOTE — TELEPHONE ENCOUNTER
Discussed with Dr. Edwina Pinon, may schedule laser doppler and follow up after patient is discharged from the rehab facility. Please cancel current testing and please call the rehab facility to notify that the testing has been cancelled and to please instruct them to notify our office once a discharge plan has been established so we can get the testing and appointment arranged.

## 2021-03-22 LAB
BASOPHILS ABSOLUTE: ABNORMAL
BASOPHILS RELATIVE PERCENT: 0.3 %
C-REACTIVE PROTEIN: 22.2
EOSINOPHILS ABSOLUTE: 0.5 /ΜL
EOSINOPHILS RELATIVE PERCENT: 8.5 %
HCT VFR BLD CALC: 31.6 % (ref 41–53)
HEMOGLOBIN: 10.2 G/DL (ref 13.5–17.5)
LYMPHOCYTES ABSOLUTE: 1.8 /ΜL
LYMPHOCYTES RELATIVE PERCENT: 31.4 %
MCH RBC QN AUTO: 26.7 PG
MCHC RBC AUTO-ENTMCNC: 32.3 G/DL
MCV RBC AUTO: 82.7 FL
MONOCYTES ABSOLUTE: 0.4 /ΜL
MONOCYTES RELATIVE PERCENT: 7.7 %
NEUTROPHILS ABSOLUTE: 3 /ΜL
NEUTROPHILS RELATIVE PERCENT: 52.1 %
PDW BLD-RTO: 14.9 %
PLATELET # BLD: 306 K/ΜL
PMV BLD AUTO: 8.3 FL
RBC # BLD: 3.83 10^6/ΜL
SEDIMENTATION RATE, ERYTHROCYTE: 81
WBC # BLD: 5.7 10^3/ML

## 2022-09-16 NOTE — ED NOTES
Report called to [facility] on the [address] [facility]  03/01/21 2003 Hot

## 2023-11-03 NOTE — PROGRESS NOTES
Patient refuses to get back in bed in order to do daily weight.  Will continue to monitor Pt states she has had a sore throat for over 2 weeks, developed right ear pain.

## (undated) DEVICE — INTENDED FOR TISSUE SEPARATION, AND OTHER PROCEDURES THAT REQUIRE A SHARP SURGICAL BLADE TO PUNCTURE OR CUT.: Brand: BARD-PARKER ® CARBON RIB-BACK BLADES

## (undated) DEVICE — SYRINGE MED 3ML CLR PLAS STD N CTRL LUERLOCK TIP DISP

## (undated) DEVICE — SOLUTION INJ LR VISIV 1000ML BG

## (undated) DEVICE — STRIP,CLOSURE,WOUND,MEDI-STRIP,1/2X4: Brand: MEDLINE

## (undated) DEVICE — SUTURE VCRL SZ 2-0 L27IN ABSRB UD L26MM SH 1/2 CIR J417H

## (undated) DEVICE — Z DISCONTINUED NO SUB IDED GLOVE SURG BEAD CUF 8 STD PF WHT STRL TRIUMPH LT LTX

## (undated) DEVICE — LOOP,VESSEL,MAXI,BLUE,2/PK,STERILE: Brand: MEDLINE

## (undated) DEVICE — PODIATRY PK

## (undated) DEVICE — GOWN,SIRUS,POLYRNF,BRTHSLV,XLN/XXL,18/CS: Brand: MEDLINE

## (undated) DEVICE — SUTURE ETHLN SZ 3-0 L18IN NONABSORBABLE BLK FS-1 L24MM 3/8 663H

## (undated) DEVICE — BANDAGE COMPR M W4INXL10YD WHT BGE VELC E MTRX HK AND LOOP

## (undated) DEVICE — GARMENT,MEDLINE,DVT,INT,CALF,MED, GEN2: Brand: MEDLINE

## (undated) DEVICE — COAXIAL HIGH FLOW TIP WITH SOFT SHIELD

## (undated) DEVICE — PLATE ES AD W 9FT CRD 2

## (undated) DEVICE — COVER LT HNDL BLU PLAS

## (undated) DEVICE — Z INACTIVE USE 2660664 SOLUTION IRRIG 3000ML 0.9% SOD CHL USP UROMATIC PLAS CONT

## (undated) DEVICE — JEWISH HOSPITAL TURNOVER KIT: Brand: MEDLINE INDUSTRIES, INC.

## (undated) DEVICE — SUTURE VCRL SZ 2-0 L27IN ABSRB UD L26MM CT-2 1/2 CIR J269H

## (undated) DEVICE — SUTURE ETHLN SZ 3-0 L18IN NONABSORBABLE BLK PS-2 L19MM 3/8 1669H

## (undated) DEVICE — SOLUTION IV 1000ML 0.9% SOD CHL

## (undated) DEVICE — SYRINGE MED 10ML LUERLOCK TIP W/O SFTY DISP

## (undated) DEVICE — TRAY PREP DRY W/ PREM GLV 2 APPL 6 SPNG 2 UNDPD 1 OVERWRAP

## (undated) DEVICE — SUTURE NONABSORBABLE MONOFILAMENT 2-0 FS 18 IN ETHILON 664H

## (undated) DEVICE — APPLICATOR MEDICATED 26 CC SOLUTION HI LT ORNG CHLORAPREP

## (undated) DEVICE — NEEDLE HYPO 22GA L1.5IN BLK POLYPR HUB S STL REG BVL STR

## (undated) DEVICE — MICRO SAGITTAL BLADE (9.4 X 0.4 X 26.2MM)

## (undated) DEVICE — SUTURE VCRL SZ 3-0 L27IN ABSRB UD L26MM SH 1/2 CIR J416H

## (undated) DEVICE — GAUZE,SPONGE,4"X4",16PLY,XRAY,STRL,LF: Brand: MEDLINE